# Patient Record
Sex: MALE | Race: WHITE | NOT HISPANIC OR LATINO | Employment: FULL TIME | URBAN - METROPOLITAN AREA
[De-identification: names, ages, dates, MRNs, and addresses within clinical notes are randomized per-mention and may not be internally consistent; named-entity substitution may affect disease eponyms.]

---

## 2017-01-03 ENCOUNTER — AMBULATORY - HEALTHEAST (OUTPATIENT)
Dept: ADDICTION MEDICINE | Facility: CLINIC | Age: 27
End: 2017-01-03

## 2017-01-05 ENCOUNTER — OFFICE VISIT - HEALTHEAST (OUTPATIENT)
Dept: ADDICTION MEDICINE | Facility: CLINIC | Age: 27
End: 2017-01-05

## 2017-01-05 DIAGNOSIS — F10.20 ALCOHOL USE DISORDER, MODERATE, DEPENDENCE (H): ICD-10-CM

## 2017-01-09 ENCOUNTER — AMBULATORY - HEALTHEAST (OUTPATIENT)
Dept: ADDICTION MEDICINE | Facility: CLINIC | Age: 27
End: 2017-01-09

## 2017-01-19 ENCOUNTER — OFFICE VISIT - HEALTHEAST (OUTPATIENT)
Dept: ADDICTION MEDICINE | Facility: CLINIC | Age: 27
End: 2017-01-19

## 2017-01-19 DIAGNOSIS — F10.20 ALCOHOL USE DISORDER, MODERATE, DEPENDENCE (H): ICD-10-CM

## 2017-01-20 ENCOUNTER — AMBULATORY - HEALTHEAST (OUTPATIENT)
Dept: ADDICTION MEDICINE | Facility: CLINIC | Age: 27
End: 2017-01-20

## 2021-05-28 ENCOUNTER — RECORDS - HEALTHEAST (OUTPATIENT)
Dept: ADMINISTRATIVE | Facility: CLINIC | Age: 31
End: 2021-05-28

## 2021-06-08 NOTE — PROGRESS NOTES
[facility] CHEMICAL DEPENDENCY   DISCHARGE SUMMARY       NAME:  Tony Bonilla   Physician:     MRN:  [id number] :  Donald Welander BS, [facility]#:  [id number] Funding Source:  [employer]   Admit Date: 3/30/16 Discharge Date: 2017     :  1990 Hours Completed: 88 hours of IOP, 24 hours of Relapse Prevention aftercare   Initial Diagnosis:    Moderate Alcohol Use Disorder, F10.20  Moderate Cannabis Use Disorder, F12.20 Final Diagnosis:  Moderate Alcohol Use Disorder, F10.20, Moderate Cannabis Use Disorder, F12.20   Discharge Address:    [address] [place] [address]          Discharge Type:  With Staff Approval (WSA)    Reasons for and circumstances of service termination:   Tony has successfully completed both IOP and the Relapse Prevention aftercare program. Pt met all treatment attendance, engagement, and abstinence expectations and is discharged on this date, 2017, WSA.        Dimension/Course of Treatment/Individualized Care:   1. Withdrawal Potential - Risk level - 0, No Concerns. Pt reports his last day of use for alcohol and marijuana was 3/1/2016, with no withdrawal concerns.      2. Biomedical Conditions and Complications - Risk level - 0, No Concerns Pt reports no current biomedical concerns, no primary care physician, but is able to access medical care as needed.      3. Emotional/Behavioral/Cognitive Conditions and Complications - Risk level -  1, Mild Concerns Pt reports no MH diagnosis, but has had MH services in the past.  Pt reports some linger feelings of sadness over no relationship with his former stepson, but appears to have made some progress during tx. Pt continues to be very positive about current SO.      4. Treatment Acceptance/Resistance - Risk Level -0, no Concerns Pt reports a desire to live a sober lifestyle and met all treatment expectations. Pt is externally motivated by probation, but appears to be feeling some benefits  of sustained sober living.      5. Relapse/Continued Use/Continued Problem Potential - 1, mild Concerns. Pt remains mildly vulnerable to relapse due to sober living skills are just in development and hx of return to use. Pt appears to have made progress in IOP and aftercare and was able to cite coping skills at discharge.        6. Recovery Environment - Risk level -1, Mild Concerns. Pt reports he is employed full-time and owns his own home. Pt reports supportive SO, and they appear to be growing closer. Pt reports significant support from his moiz community and is involved in music. Pt reports great support from his [employer].  Pt has minimal connection to the sober community with sporadic AA attendance. Pt is on probation.        Services Provided: Intake, assessment, treatment planning, education, group discussion, film, lectures, 1x1 therapy, and recommendations at discharge.     Strengths: Very intelligent, strong [employer] background, hard working. Supportive and appeared genuinely invested in peers lives.   Needs: To remain clean and sober, continue to build sober support through [employer] and peers.        Program Involvement: Good  Attendance: Fair  Ability to relate in group/   Other program activities: Excellent  Assignment Completion: Good  Overall Behavior: Excellent  Reported Family/Significant   Other Involvement: Good    Prognosis: Good      Recommendations       Remain clean and sober, meet all legal obligations, and continue to build sober support through [employer] and peers.     Mental Health Referral  None at discharge      Physical Health Referral:  Personal Physician                Counselor Name and Title:  Donald Welander BS, [facility]    Date:  1/20/17  Time:  3:09 PM

## 2021-06-08 NOTE — PROGRESS NOTES
"Weekly Progress Note  Tony Bonilla  1990  [id number]      D) Pt attended 1 groups  this week with 0 absences. A) Staff facilitated groups and reviewed tx progress. Assessed for [facility]. R) No VAP needed at this time. Pt is working on the following dimensions.    Dimension I- Intoxication/Withdrawal Potential- Risk Ratin, No Concerns. Pt reports his last day of use for alcohol and marijuana was 3/1/2016, with no withdrawal concerns.   Dimension II- Biomedical Conditions- Risk Ratin, No Concerns Pt reports no current biomedical concerns, no primary care physician, but is able to access medical care as needed.   Dimension III- Emotional, Behavioral, Cognitive Concerns- Risk Ratin, Mild Concerns Pt reports no MH diagnosis, but has had MH services in the past. Pt reports he continues to be angry with his ex-SO and is sad he cannot see her son who he became close to. Pt continued to report frustration with his Restoration[employer] , but was also upbeat about his new relationship. Pt reported his only concern is that he is \"too good looking\" and staff noted Pt appears very confident at this time.   Dimension IV- Readiness to Change- Risk Ratin, Mild Concerns Pt reports a desire to live a sober lifestyle and meet treatment expectations. Pt is externally motivated by probation, but appears to be feeling some benefits of sustained sober living. Pt has had trouble with attendance, but staff is also aware of Pt's work obligations on .   Dimension V- Relapse Potential- Risk Ratin, Moderate Concerns.  Pt remains vulnerable to relapse due to sober living skills are just in development at this time. Pt has been able to maintain some periods of abstinence in the past, but has often returned to use.   Dimension VI- Recovery Environment- Risk Ratin, Mild Concerns. Pt reports he is employed full-time and owns his own home.  Pt reports supportive SO, and they appear to be growing closer.    Pt reports " "significant support from his moiz community and is involved in music. Pt has minimal connection to the sober community with sporadic AA attendance. Pt is on probation.     T) Client educated on Keeping it Simple.   Client has completed 88 hours of IOP. Pt has completed 20 hours of Relapse Prevention.  Projected discharge date is 1/20/17. Current discharge plan is TBD.     Donald Welander, LADC        Psycho-Educational Curriculum  Date Attended  Psycho-Educational Curriculum  Date Attended    Acceptance   Shame/Guilt     1st Step   Anger/Rage     Affirmations   Mental Health     Automatic Negative Thoughts   Anxiety     Cross Addiction   Co-Occurring Disorders     Stages of Change   Tram/Bipolar     Relapse   Trauma      Addictive Thoughts   Victim Identity     Coping Skills   Sober Structure     Relapse Prevention   Continuum of Care     Medical Aspects   Non-12 Step Support     Brain/Neurotransmitters   Priorities     Medication Compliance   Spirituality     SABI Alcohol/Drug Research   Weekend Planner     Physical Health   Educational Videos     Post Acute Withdrawal   1st Step     Pregnancy and Drug Use   2nd Step     Sexual Health   Assertive Communication     Short-Term/Long-Term Effects   My name is Manuel GONZALES    Relationships   Cross Addiction     Assertive Communication   God As We Understood Him     Boundaries   HBO Relapse     Codependence    HBO What Is Addiction     Defense Mechanisms    Medical Aspects 1     Family Roles   Medical Aspects 2     Goodbye Letter   [employer] Geographic: Stress     Intimacy   PBS Depression Out of the Shadows     Needs/Dealbreakers in Relationships   The Anonymous People    Socialization Skills   Omar     Feelings   Eliecer Noe \"Highjacked Brain\"    ABC Model of Emotion   Alexis Saleh Humor in Tx    Grief and Loss   The Mindfulness Movie    Healthy vs. Unhealthy Feelings   Manuel GONZALES documentary     Meditation/Mindfulness       Overconfidence/Complacency       Resentments   "     Stress

## 2021-06-08 NOTE — PROGRESS NOTES
Weekly Progress Note  Tony Bonilla  1990  [id number]      D) Pt attended 1 groups  this week with 0 absences. A) Staff facilitated groups and reviewed tx progress. Assessed for VA. R) No VAP needed at this time. Pt is working on the following dimensions.    Dimension I- Intoxication/Withdrawal Potential- Risk Ratin, No Concerns. Pt reports his last day of use for alcohol and marijuana was 3/1/2016, with no withdrawal concerns.   Dimension II- Biomedical Conditions- Risk Ratin, No Concerns Pt reports no current biomedical concerns, no primary care physician, but is able to access medical care as needed.   Dimension III- Emotional, Behavioral, Cognitive Concerns- Risk Ratin, Mild Concerns Pt reports no MH diagnosis, but has had MH services in the past. Pt reports he continues to be angry with his ex-SO and is sad he cannot see her son who he became close to. Pt reports some linger feelings of sadness over no relationship with his former stepson. Pt continues to be very positive about current SO.   Dimension IV- Readiness to Change- Risk Ratin, no Concerns Pt reports a desire to live a sober lifestyle and meet treatment expectations. Pt is externally motivated by probation, but appears to be feeling some benefits of sustained sober living. Pt has had trouble with attendance, but staff is also aware of Pt's work obligations on .   Dimension V- Relapse Potential- Risk Ratin, Moderate Concerns.  Pt remains vulnerable to relapse due to sober living skills are just in development at this time. Pt has been able to maintain some periods of abstinence in the past, but has often returned to use.   Dimension VI- Recovery Environment- Risk Ratin, Mild Concerns. Pt reports he is employed full-time and owns his own home.  Pt reports supportive SO, and they appear to be growing closer.    Pt reports significant support from his moiz community and is involved in music. Pt reports great support  "from his drummer.  Pt has minimal connection to the sober community with sporadic AA attendance. Pt is on probation.     T) Client educated on Sober Support.   Client has completed 88 hours of IOP. Pt has completed 22 hours of Relapse Prevention.  Projected discharge date is 1/27/17. Current discharge plan is TBD.     Donald Welander, LADC        Psycho-Educational Curriculum  Date Attended  Psycho-Educational Curriculum  Date Attended    Acceptance   Shame/Guilt     1st Step   Anger/Rage     Affirmations   Mental Health     Automatic Negative Thoughts   Anxiety     Cross Addiction   Co-Occurring Disorders     Stages of Change   Tram/Bipolar     Relapse   Trauma      Addictive Thoughts   Victim Identity     Coping Skills   Sober Structure     Relapse Prevention   Continuum of Care     Medical Aspects   Non-12 Step Support     Brain/Neurotransmitters   Priorities     Medication Compliance   Spirituality     SABI Alcohol/Drug Research   Weekend Planner     Physical Health   Educational Videos     Post Acute Withdrawal   1st Step     Pregnancy and Drug Use   2nd Step     Sexual Health   Assertive Communication     Short-Term/Long-Term Effects   My name is Manuel Maurice   Cross Addiction     Assertive Communication   God As We Understood Him     Boundaries   HBO Relapse     Codependence    HBO What Is Addiction     Defense Mechanisms    Medical Aspects 1     Family Roles   Medical Aspects 2     Goodbye Letter   [employer] Geographic: Stress     Intimacy   PBS Depression Out of the Shadows     Needs/Dealbreakers in Relationships   The Anonymous People    Socialization Skills   [place]     Feelings   Eliecer Noe \"Highjacked Brain\"    ABC Model of Emotion   Alexis Saleh Humor in Tx    Grief and Loss   The Mindfulness Movie    Healthy vs. Unhealthy Feelings   Manuel GONZALES documentary     Meditation/Mindfulness       Overconfidence/Complacency       Resentments       Stress           "

## 2022-10-31 ENCOUNTER — OFFICE VISIT (OUTPATIENT)
Dept: PEDIATRICS | Facility: CLINIC | Age: 32
End: 2022-10-31
Attending: NURSE PRACTITIONER
Payer: COMMERCIAL

## 2022-10-31 ENCOUNTER — HOSPITAL ENCOUNTER (OUTPATIENT)
Dept: CT IMAGING | Facility: CLINIC | Age: 32
Discharge: HOME | End: 2022-10-31
Attending: PHYSICIAN ASSISTANT | Admitting: PHYSICIAN ASSISTANT
Payer: COMMERCIAL

## 2022-10-31 ENCOUNTER — HOSPITAL ENCOUNTER (INPATIENT)
Facility: CLINIC | Age: 32
LOS: 3 days | Discharge: HOME | End: 2022-11-03
Attending: EMERGENCY MEDICINE | Admitting: INTERNAL MEDICINE
Payer: COMMERCIAL

## 2022-10-31 ENCOUNTER — VIRTUAL VISIT (OUTPATIENT)
Dept: PEDIATRICS | Facility: CLINIC | Age: 32
End: 2022-10-31
Payer: COMMERCIAL

## 2022-10-31 VITALS
TEMPERATURE: 101 F | HEART RATE: 119 BPM | DIASTOLIC BLOOD PRESSURE: 104 MMHG | RESPIRATION RATE: 18 BRPM | OXYGEN SATURATION: 97 % | SYSTOLIC BLOOD PRESSURE: 148 MMHG | WEIGHT: 243 LBS

## 2022-10-31 DIAGNOSIS — R82.998 DARK URINE: ICD-10-CM

## 2022-10-31 DIAGNOSIS — R10.31 RLQ ABDOMINAL PAIN: ICD-10-CM

## 2022-10-31 DIAGNOSIS — R17 ELEVATED BILIRUBIN: ICD-10-CM

## 2022-10-31 DIAGNOSIS — R19.5 DARK STOOLS: ICD-10-CM

## 2022-10-31 DIAGNOSIS — R10.31 RLQ ABDOMINAL PAIN: Primary | ICD-10-CM

## 2022-10-31 DIAGNOSIS — K57.32 SIGMOID DIVERTICULITIS: Primary | ICD-10-CM

## 2022-10-31 DIAGNOSIS — R19.7 DIARRHEA, UNSPECIFIED TYPE: ICD-10-CM

## 2022-10-31 DIAGNOSIS — R10.30 LOWER ABDOMINAL PAIN: ICD-10-CM

## 2022-10-31 DIAGNOSIS — K76.0 HEPATIC STEATOSIS: ICD-10-CM

## 2022-10-31 DIAGNOSIS — Z87.891 PERSONAL HISTORY OF TOBACCO USE, PRESENTING HAZARDS TO HEALTH: ICD-10-CM

## 2022-10-31 DIAGNOSIS — R17 ICTERUS: ICD-10-CM

## 2022-10-31 DIAGNOSIS — K57.20 PERFORATION OF SIGMOID COLON DUE TO DIVERTICULITIS: Primary | ICD-10-CM

## 2022-10-31 DIAGNOSIS — A41.9 SEPSIS WITHOUT ACUTE ORGAN DYSFUNCTION, DUE TO UNSPECIFIED ORGANISM (H): ICD-10-CM

## 2022-10-31 DIAGNOSIS — F10.20 ALCOHOL USE DISORDER, SEVERE, DEPENDENCE (H): ICD-10-CM

## 2022-10-31 DIAGNOSIS — R21 RASH: ICD-10-CM

## 2022-10-31 LAB
ALBUMIN SERPL BCG-MCNC: 4.3 G/DL (ref 3.5–5.2)
ALBUMIN UR-MCNC: 300 MG/DL
ALP SERPL-CCNC: 88 U/L (ref 40–129)
ALT SERPL W P-5'-P-CCNC: 93 U/L (ref 10–50)
ANION GAP SERPL CALCULATED.3IONS-SCNC: 16 MMOL/L (ref 7–15)
APPEARANCE UR: CLEAR
AST SERPL W P-5'-P-CCNC: 32 U/L (ref 10–50)
BACTERIA #/AREA URNS HPF: ABNORMAL /HPF
BASOPHILS # BLD AUTO: 0.1 10E3/UL (ref 0–0.2)
BASOPHILS NFR BLD AUTO: 0 %
BILIRUB SERPL-MCNC: 6.2 MG/DL
BILIRUB UR QL STRIP: ABNORMAL
BUN SERPL-MCNC: 11.9 MG/DL (ref 6–20)
CALCIUM SERPL-MCNC: 9.1 MG/DL (ref 8.6–10)
CHLORIDE SERPL-SCNC: 93 MMOL/L (ref 98–107)
COLOR UR AUTO: ABNORMAL
CREAT SERPL-MCNC: 1.12 MG/DL (ref 0.67–1.17)
CRP SERPL-MCNC: 246.32 MG/L
DEPRECATED HCO3 PLAS-SCNC: 27 MMOL/L (ref 22–29)
EOSINOPHIL # BLD AUTO: 0.2 10E3/UL (ref 0–0.7)
EOSINOPHIL NFR BLD AUTO: 1 %
ERYTHROCYTE [DISTWIDTH] IN BLOOD BY AUTOMATED COUNT: 12.8 % (ref 10–15)
ERYTHROCYTE [SEDIMENTATION RATE] IN BLOOD BY WESTERGREN METHOD: 9 MM/HR (ref 0–15)
GFR SERPL CREATININE-BSD FRML MDRD: 90 ML/MIN/1.73M2
GLUCOSE SERPL-MCNC: 122 MG/DL (ref 70–99)
GLUCOSE UR STRIP-MCNC: NEGATIVE MG/DL
HCT VFR BLD AUTO: 49.7 % (ref 40–53)
HGB BLD-MCNC: 16.6 G/DL (ref 13.3–17.7)
HGB UR QL STRIP: ABNORMAL
IMM GRANULOCYTES # BLD: 0.2 10E3/UL
IMM GRANULOCYTES NFR BLD: 1 %
KETONES UR STRIP-MCNC: NEGATIVE MG/DL
LACTATE SERPL-SCNC: 1 MMOL/L (ref 0.7–2)
LEUKOCYTE ESTERASE UR QL STRIP: NEGATIVE
LIPASE SERPL-CCNC: 18 U/L (ref 13–60)
LYMPHOCYTES # BLD AUTO: 2.3 10E3/UL (ref 0.8–5.3)
LYMPHOCYTES NFR BLD AUTO: 10 %
MCH RBC QN AUTO: 33 PG (ref 26.5–33)
MCHC RBC AUTO-ENTMCNC: 33.4 G/DL (ref 31.5–36.5)
MCV RBC AUTO: 99 FL (ref 78–100)
MONOCYTES # BLD AUTO: 2.6 10E3/UL (ref 0–1.3)
MONOCYTES NFR BLD AUTO: 11 %
MUCOUS THREADS #/AREA URNS LPF: PRESENT /LPF
NEUTROPHILS # BLD AUTO: 18.8 10E3/UL (ref 1.6–8.3)
NEUTROPHILS NFR BLD AUTO: 77 %
NITRATE UR QL: NEGATIVE
NRBC # BLD AUTO: 0 10E3/UL
NRBC BLD AUTO-RTO: 0 /100
PH UR STRIP: 6 [PH] (ref 5–7)
PLATELET # BLD AUTO: 268 10E3/UL (ref 150–450)
POTASSIUM SERPL-SCNC: 3.6 MMOL/L (ref 3.4–5.3)
PROT SERPL-MCNC: 7.5 G/DL (ref 6.4–8.3)
RBC # BLD AUTO: 5.03 10E6/UL (ref 4.4–5.9)
RBC URINE: 7 /HPF
SARS-COV-2 RNA RESP QL NAA+PROBE: NEGATIVE
SODIUM SERPL-SCNC: 136 MMOL/L (ref 136–145)
SP GR UR STRIP: 1.02 (ref 1–1.03)
UROBILINOGEN UR STRIP-MCNC: 3 MG/DL
WBC # BLD AUTO: 24.1 10E3/UL (ref 4–11)
WBC URINE: 3 /HPF

## 2022-10-31 PROCEDURE — 36415 COLL VENOUS BLD VENIPUNCTURE: CPT | Performed by: PHYSICIAN ASSISTANT

## 2022-10-31 PROCEDURE — 85652 RBC SED RATE AUTOMATED: CPT | Performed by: PHYSICIAN ASSISTANT

## 2022-10-31 PROCEDURE — 87040 BLOOD CULTURE FOR BACTERIA: CPT | Performed by: EMERGENCY MEDICINE

## 2022-10-31 PROCEDURE — 36415 COLL VENOUS BLD VENIPUNCTURE: CPT | Performed by: EMERGENCY MEDICINE

## 2022-10-31 PROCEDURE — 99222 1ST HOSP IP/OBS MODERATE 55: CPT | Mod: AI | Performed by: INTERNAL MEDICINE

## 2022-10-31 PROCEDURE — 258N000003 HC RX IP 258 OP 636: Performed by: EMERGENCY MEDICINE

## 2022-10-31 PROCEDURE — 99285 EMERGENCY DEPT VISIT HI MDM: CPT | Mod: 25

## 2022-10-31 PROCEDURE — 85025 COMPLETE CBC W/AUTO DIFF WBC: CPT | Performed by: PHYSICIAN ASSISTANT

## 2022-10-31 PROCEDURE — 83605 ASSAY OF LACTIC ACID: CPT | Performed by: EMERGENCY MEDICINE

## 2022-10-31 PROCEDURE — C9803 HOPD COVID-19 SPEC COLLECT: HCPCS

## 2022-10-31 PROCEDURE — 96375 TX/PRO/DX INJ NEW DRUG ADDON: CPT

## 2022-10-31 PROCEDURE — 250N000009 HC RX 250: Performed by: PHYSICIAN ASSISTANT

## 2022-10-31 PROCEDURE — 250N000011 HC RX IP 250 OP 636: Performed by: PHYSICIAN ASSISTANT

## 2022-10-31 PROCEDURE — 96376 TX/PRO/DX INJ SAME DRUG ADON: CPT

## 2022-10-31 PROCEDURE — 258N000003 HC RX IP 258 OP 636: Performed by: INTERNAL MEDICINE

## 2022-10-31 PROCEDURE — 250N000011 HC RX IP 250 OP 636: Performed by: EMERGENCY MEDICINE

## 2022-10-31 PROCEDURE — 96361 HYDRATE IV INFUSION ADD-ON: CPT

## 2022-10-31 PROCEDURE — 99207 REFERRAL TO ACUTE AND DIAGNOSTIC SERVICES: CPT | Performed by: NURSE PRACTITIONER

## 2022-10-31 PROCEDURE — 99205 OFFICE O/P NEW HI 60 MIN: CPT | Performed by: PHYSICIAN ASSISTANT

## 2022-10-31 PROCEDURE — 120N000001 HC R&B MED SURG/OB

## 2022-10-31 PROCEDURE — 250N000013 HC RX MED GY IP 250 OP 250 PS 637: Performed by: INTERNAL MEDICINE

## 2022-10-31 PROCEDURE — 87086 URINE CULTURE/COLONY COUNT: CPT | Performed by: PHYSICIAN ASSISTANT

## 2022-10-31 PROCEDURE — 96365 THER/PROPH/DIAG IV INF INIT: CPT

## 2022-10-31 PROCEDURE — 86140 C-REACTIVE PROTEIN: CPT | Performed by: PHYSICIAN ASSISTANT

## 2022-10-31 PROCEDURE — 74177 CT ABD & PELVIS W/CONTRAST: CPT

## 2022-10-31 PROCEDURE — U0005 INFEC AGEN DETEC AMPLI PROBE: HCPCS | Performed by: EMERGENCY MEDICINE

## 2022-10-31 PROCEDURE — 83690 ASSAY OF LIPASE: CPT | Performed by: PHYSICIAN ASSISTANT

## 2022-10-31 PROCEDURE — 80053 COMPREHEN METABOLIC PANEL: CPT | Performed by: PHYSICIAN ASSISTANT

## 2022-10-31 PROCEDURE — 81001 URINALYSIS AUTO W/SCOPE: CPT | Performed by: PHYSICIAN ASSISTANT

## 2022-10-31 RX ORDER — ACETAMINOPHEN 325 MG/1
650 TABLET ORAL EVERY 4 HOURS PRN
Status: DISCONTINUED | OUTPATIENT
Start: 2022-10-31 | End: 2022-11-03 | Stop reason: HOSPADM

## 2022-10-31 RX ORDER — IBUPROFEN 200 MG
400 TABLET ORAL EVERY 6 HOURS PRN
Status: ON HOLD | COMMUNITY
End: 2022-11-03

## 2022-10-31 RX ORDER — LIDOCAINE 40 MG/G
CREAM TOPICAL
Status: DISCONTINUED | OUTPATIENT
Start: 2022-10-31 | End: 2022-11-03 | Stop reason: HOSPADM

## 2022-10-31 RX ORDER — SODIUM CHLORIDE, SODIUM LACTATE, POTASSIUM CHLORIDE, CALCIUM CHLORIDE 600; 310; 30; 20 MG/100ML; MG/100ML; MG/100ML; MG/100ML
INJECTION, SOLUTION INTRAVENOUS CONTINUOUS
Status: DISCONTINUED | OUTPATIENT
Start: 2022-10-31 | End: 2022-11-03

## 2022-10-31 RX ORDER — KETOROLAC TROMETHAMINE 15 MG/ML
15 INJECTION, SOLUTION INTRAMUSCULAR; INTRAVENOUS ONCE
Status: COMPLETED | OUTPATIENT
Start: 2022-10-31 | End: 2022-10-31

## 2022-10-31 RX ORDER — ONDANSETRON 2 MG/ML
4 INJECTION INTRAMUSCULAR; INTRAVENOUS ONCE
Status: COMPLETED | OUTPATIENT
Start: 2022-10-31 | End: 2022-10-31

## 2022-10-31 RX ORDER — ACETAMINOPHEN 500 MG
1000 TABLET ORAL ONCE
Status: DISCONTINUED | OUTPATIENT
Start: 2022-10-31 | End: 2022-10-31

## 2022-10-31 RX ORDER — BETAMETHASONE DIPROPIONATE 0.5 MG/G
OINTMENT, AUGMENTED TOPICAL 2 TIMES DAILY
Qty: 50 G | Refills: 11 | Status: SHIPPED | OUTPATIENT
Start: 2022-10-31 | End: 2022-11-29

## 2022-10-31 RX ORDER — MORPHINE SULFATE 4 MG/ML
4 INJECTION, SOLUTION INTRAMUSCULAR; INTRAVENOUS
Status: DISCONTINUED | OUTPATIENT
Start: 2022-10-31 | End: 2022-11-03 | Stop reason: HOSPADM

## 2022-10-31 RX ORDER — ONDANSETRON 2 MG/ML
4 INJECTION INTRAMUSCULAR; INTRAVENOUS EVERY 6 HOURS PRN
Status: DISCONTINUED | OUTPATIENT
Start: 2022-10-31 | End: 2022-11-03 | Stop reason: HOSPADM

## 2022-10-31 RX ORDER — MORPHINE SULFATE 4 MG/ML
4 INJECTION, SOLUTION INTRAMUSCULAR; INTRAVENOUS ONCE
Status: COMPLETED | OUTPATIENT
Start: 2022-10-31 | End: 2022-10-31

## 2022-10-31 RX ORDER — ACETAMINOPHEN 650 MG/1
650 SUPPOSITORY RECTAL EVERY 4 HOURS PRN
Status: DISCONTINUED | OUTPATIENT
Start: 2022-10-31 | End: 2022-11-03 | Stop reason: HOSPADM

## 2022-10-31 RX ORDER — ONDANSETRON 4 MG/1
4 TABLET, ORALLY DISINTEGRATING ORAL EVERY 6 HOURS PRN
Status: DISCONTINUED | OUTPATIENT
Start: 2022-10-31 | End: 2022-11-03 | Stop reason: HOSPADM

## 2022-10-31 RX ORDER — ACETAMINOPHEN 325 MG/1
650 TABLET ORAL EVERY 6 HOURS PRN
COMMUNITY
End: 2022-11-16

## 2022-10-31 RX ORDER — IOPAMIDOL 755 MG/ML
500 INJECTION, SOLUTION INTRAVASCULAR ONCE
Status: COMPLETED | OUTPATIENT
Start: 2022-10-31 | End: 2022-10-31

## 2022-10-31 RX ORDER — MUPIROCIN 20 MG/G
OINTMENT TOPICAL 3 TIMES DAILY
Qty: 30 G | Refills: 11 | Status: SHIPPED | OUTPATIENT
Start: 2022-10-31 | End: 2022-11-29

## 2022-10-31 RX ADMIN — SODIUM CHLORIDE 1000 ML: 9 INJECTION, SOLUTION INTRAVENOUS at 16:12

## 2022-10-31 RX ADMIN — Medication 1000 MG: at 12:11

## 2022-10-31 RX ADMIN — ACETAMINOPHEN 650 MG: 325 TABLET, FILM COATED ORAL at 21:32

## 2022-10-31 RX ADMIN — KETOROLAC TROMETHAMINE 15 MG: 15 INJECTION, SOLUTION INTRAMUSCULAR; INTRAVENOUS at 16:13

## 2022-10-31 RX ADMIN — TAZOBACTAM SODIUM AND PIPERACILLIN SODIUM 4.5 G: 500; 4 INJECTION, SOLUTION INTRAVENOUS at 17:00

## 2022-10-31 RX ADMIN — SODIUM CHLORIDE, POTASSIUM CHLORIDE, SODIUM LACTATE AND CALCIUM CHLORIDE: 600; 310; 30; 20 INJECTION, SOLUTION INTRAVENOUS at 21:09

## 2022-10-31 RX ADMIN — IOPAMIDOL 90 ML: 755 INJECTION, SOLUTION INTRAVENOUS at 12:41

## 2022-10-31 RX ADMIN — SODIUM CHLORIDE 65 ML: 9 INJECTION, SOLUTION INTRAVENOUS at 12:41

## 2022-10-31 RX ADMIN — ONDANSETRON 4 MG: 2 INJECTION INTRAMUSCULAR; INTRAVENOUS at 16:12

## 2022-10-31 RX ADMIN — MORPHINE SULFATE 4 MG: 4 INJECTION INTRAVENOUS at 16:12

## 2022-10-31 ASSESSMENT — ACTIVITIES OF DAILY LIVING (ADL)
ADLS_ACUITY_SCORE: 35

## 2022-10-31 ASSESSMENT — ENCOUNTER SYMPTOMS
FEVER: 1
CHILLS: 1
ABDOMINAL PAIN: 1
DIARRHEA: 1
VOMITING: 1

## 2022-10-31 NOTE — ED TRIAGE NOTES
"Had a virtual visit today for low abdominal pain that started on Sunday. Pt's CT scan at the specialty clinic showed diverticulitis with perforation. Pt was told to come to ED for \"antibiotics and surgery.\" Tachycardia. Last took tylenol 12. Denies blood in stool. Denies N/V.       "

## 2022-10-31 NOTE — PROGRESS NOTES
Tony is a 32 year old who is being evaluated via a billable video visit.      How would you like to obtain your AVS? MyChart  If the video visit is dropped, the invitation should be resent by:     Assessment & Plan     (R10.31) RLQ abdominal pain  (primary encounter diagnosis)  Comment: Pt reports sudden onset last night of 7.5/10 RLQ pain radiating to the left side. Has associated chills, diarrhea, and urinary hesitancy. Denies dysuria, blood in stool. Low risk STI.   Plan: Referral to Acute and Diagnostic Services (Day         of diagnostic / First order acute)  -Spoke with ADS who agrees to take him  -Advised him to be NPO other than sips of water so he can leave a urine sample    (R21) Rash  Comment: Possibly poison ivy as he has an outdoor cat who lays on his chest. States this is similar to when he had poison ivy in the past. Did consider ringworm given that he is exposed to his cat and his SO had this recently, but lesions to not appear classic for ringworm. Did consider scabies but less likely given localized rash. Appears to be some superinfection impetigo.   Plan: mupirocin (BACTROBAN) 2 % external ointment,         augmented betamethasone dipropionate         (DIPROLENE-AF) 0.05 % external ointment  -Trial topical steroids and mupirocin  -Avoid cat. Wash all clothing and sheets  -If not improving, re-evaluate and consider 6 weeks oral steroids if poison ivy is likely or topical vs oral antifungals if this seems to be more ringworm.     No follow-ups on file.    MICHEAL Rivers CNP  [facility] MARISELA Gandhi   Tony is a 32 year old, presenting for the following health issues:  No chief complaint on file.      HPI     Review of Systems   Constitutional, HEENT, cardiovascular, pulmonary, gi and gu systems are negative, except as otherwise noted.      Objective           Vitals:  No vitals were obtained today due to virtual visit.    Physical Exam   DERM: No rash or other  lesions.          Video-Visit Details    Video Start Time: 1015    Type of service:  Video Visit    Video End Time:9:37 AM    Originating Location (pt. Location): Home        Distant Location (provider location):  Off-site    Platform used for Video Visit: Ashley

## 2022-10-31 NOTE — RESULT ENCOUNTER NOTE
Results discussed directly with patient while patient was present. Any further details documented in the note.   Yeni Godinez PA-C

## 2022-10-31 NOTE — PROGRESS NOTES
Assessment & Plan     Perforation of sigmoid colon due to diverticulitis  Lower abdominal pain  Diarrhea, unspecified type  Dark stools  Stat CT shows sigmoid diverticulitis with perforation.  Labs/fever consistent with possible septic process.  Patient will be transported to the emergency room for urgent/emergent care/likely admission.  All questions answered to the patient's satisfaction based on the information available at the time of transfer.  - Referral to Acute and Diagnostic Services (Day of diagnostic / First order acute)  - UA with Microscopic reflex to Culture  - sodium chloride (PF) 0.9% PF flush 3 mL  - acetaminophen (TYLENOL) tablet 1,000 mg  - CT Abdomen Pelvis w Contrast  - CBC with platelets differential  - Comprehensive metabolic panel  - Erythrocyte sedimentation rate auto  - CRP inflammation  - Lipase    Alcohol use disorder, severe, dependence (H)  Hepatic steatosis  Icterus  Continued EtOH use with hepatic steatosis, mild icterus, and slightly elevated LFTs all likely secondary to chronic alcohol use.  Briefly discussed possible complication/long-term sequela of continued use especially in light of family history of liver failure in mother.  Encouraged cessation/tapering with the help of PCP upon discharge.  History of intensive outpatient through [facility] [id number].  Patient admits to 2-3 alcoholic beverages nightly for quite some time.    Dark urine  Elevated bilirubin  Unclear if related to acute diverticulitis and possible sepsis.  Close follow-up recommended.  Suspect that this is secondary to alcohol use.  Cessation/tapering/follow-up/work-up strongly recommended.  - Referral to Acute and Diagnostic Services (Day of diagnostic / First order acute)  - UA with Microscopic reflex to Culture  - sodium chloride (PF) 0.9% PF flush 3 mL  - CT Abdomen Pelvis w Contrast  - CBC with platelets differential  - Comprehensive metabolic panel  - Erythrocyte sedimentation rate auto  - CRP  "inflammation  - Lipase  - Urine Culture Aerobic Bacterial - lab collect        Discussion of management or test interpretation with external physician/other qualified healthcare professional/appropriate source - Dr. Evangelista in ED for transfer of care  80  minutes spent on the date of the encounter doing chart review, history and exam, documentation and further activities per the note       Nicotine/Tobacco Cessation:  He reports that he has been smoking cigars. He started smoking about 7 years ago. He has never used smokeless tobacco.  Nicotine/Tobacco Cessation Plan:   deferred d/t emergent condition    Return today (on 10/31/2022) for ER.    [facility] 32 year [facility], presenting for the following health issues:  Abdominal Pain (Bilateral lower abdominal pain X 2 days)      HPI     Abdominal/Flank Pain  Onset/Duration: X 2 days  Description:   Character: Sharp and Cramping  Location: right lower quadrant left lower quadrant xochitl-umbilical region  Radiation: None  Intensity: 6/10  Progression of Symptoms:  worsening  Accompanying Signs & Symptoms:  Fever/Chills: YES- sweating last night, chills, did not check temp.  Gas/Bloating: YES- bloating  Nausea: YES- intermittent  Vomiting: no   Diarrhea: YES-loose, not watery - 6 BM's in last 24 hours -black in color -denies mucus  Constipation: YES-difficult to determine when BM was completley passed   Dysuria or Hematuria: YES-notes cloudy and brown urine, also notes delay in urine delivery  History:   Trauma: no   Previous similar pain: no   Previous tests done: no   Previous Abdominal Surgery: no   Precipitating factors:   Does the pain change with:     Food: YES- increased pain last night after eating blueberries     Bowel Movement: YES- some slight temporary improvement    Urination: YES- some slight temporary improvement, \"feels like pressure being released.\"   Other factors:  no   Therapies " tried and outcome: Pepto Bismuth, this didn't change any symptoms.  IBU helps with pain    When did you eat last: Apple Juice at 05:00 today, water 06:00 today      Patient was in intensive outpatient program after a rule 25 assessment in 2016 through [facility] for alcohol and cannabis 3/30/2016 - 2017.  Risk of relapse upon discharge was suspected to be low.  Currently admits to 2-3 alcoholic drinks every evening and reports this has been for quite some time.  Mother  at the age of 60 from liver and kidney failure secondary to EtOH.    Review of Systems   Constitutional, HEENT, cardiovascular, pulmonary, GI, , musculoskeletal, neuro, skin, endocrine and psych systems are negative, except as otherwise noted.      Objective    BP (!) 148/104 (BP Location: Left arm, Patient Position: Chair, Cuff Size: Adult Large)   Pulse 119   Temp (!) 101  F (38.3  C) (Oral)   Resp 18   Wt 110.2 kg (243 lb)   SpO2 97%   There is no height or weight on file to calculate BMI.  Physical Exam   GENERAL: healthy, alert and no distress  EYES: Eyes -very mild scleral icterus, PERRL and conjunctivae and sclerae normal  RESP: lungs clear to auscultation - no rales, rhonchi or wheezes  CV: regular rate and rhythm, normal S1 S2, no S3 or S4, no murmur, click or rub, no peripheral edema and peripheral pulses strong  ABDOMEN: soft, tenderness to left lower quadrant, left lower quadrant, suprapubic region with guarding, no hepatosplenomegaly, no masses and bowel sounds normal  MS: no gross musculoskeletal defects noted, no edema  SKIN: no suspicious lesions or rashes  NEURO: Normal strength and tone, mentation intact and speech normal  PSYCH: mentation appears normal, affect normal/bright    Recent Results (from the past 744 hour(s))   CT Abdomen Pelvis w Contrast    Narrative    CT ABDOMEN PELVIS W CONTRAST 10/31/2022 12:48 PM    CLINICAL HISTORY: periumbilical abdominal pain, diarrhea w/possible  melena, dark urine,  fever and bilateral lower abdominal pain - concern  for appy vs diverticulitis, ? stone vs liver/pancreas/gallbladder  disease (significant ETOH use); RLQ abdominal pain; Dark urine;  Diarrhea, unspecified type; Dark stools    TECHNIQUE: CT scan of the abdomen and pelvis was performed following  injection of IV contrast. Multiplanar reformats were obtained. Dose  reduction techniques were used.  CONTRAST: 90mL Isovue-370    COMPARISON: None.    FINDINGS:   LOWER CHEST: Unremarkable.    HEPATOBILIARY: Diffuse hepatic steatosis.    PANCREAS: Normal.    SPLEEN: Normal.    ADRENAL GLANDS: Normal.    KIDNEYS/BLADDER: No hydronephrosis.    BOWEL: Colonic diverticulosis with focal inflammation and  extraperitoneal gas in/adjacent to the mid sigmoid colon (series 2  image 171). Evidence of peritonitis without large volume  pneumoperitoneum, bowel obstruction or drainable fluid collection at  this time. Normal appendix.    PELVIC ORGANS: Normal.    ADDITIONAL FINDINGS: None.    MUSCULOSKELETAL: No acute bony abnormality.      Impression    IMPRESSION:   1.  Acute sigmoid diverticulitis with likely local/contained  perforation. No large volume pneumoperitoneum or drainable fluid  collection at this time. Consider colonoscopy after treatment if not  previously performed.  2.  Diffuse hepatic steatosis.    DAMIAN BECK MD         SYSTEM ID:  [id number]       Results for orders placed or performed in visit on 10/31/22 (from the past 24 hour(s))   UA with Microscopic reflex to Culture    Specimen: Urine, Clean Catch   Result Value Ref Range    Color Urine Orange (A) Colorless, Straw, Light Yellow, Yellow    Appearance Urine Clear Clear    Glucose Urine Negative Negative mg/dL    Bilirubin Urine Small (A) Negative    Ketones Urine Negative Negative mg/dL    Specific Gravity Urine 1.025 1.003 - 1.035    Blood Urine Small (A) Negative    pH Urine 6.0 5.0 - 7.0    Protein Albumin Urine 300 (A) Negative mg/dL    Urobilinogen Urine  3.0 (A) Normal, 2.0 mg/dL    Nitrite Urine Negative Negative    Leukocyte Esterase Urine Negative Negative    Bacteria Urine Few (A) None Seen /HPF    Mucus Urine Present (A) None Seen /LPF    RBC Urine 7 (H) <=2 /HPF    WBC Urine 3 <=5 /HPF    Narrative    Urine Culture not indicated   CBC with platelets differential    Narrative    The following orders were created for panel order CBC with platelets differential.  Procedure                               Abnormality         Status                     ---------                               -----------         ------                     CBC with platelets and d...[[id number]]  Abnormal            Final result                 Please view results for these tests on the individual orders.   Comprehensive metabolic panel   Result Value Ref Range    Sodium 136 136 - 145 mmol/L    Potassium 3.6 3.4 - 5.3 mmol/L    Chloride 93 (L) 98 - 107 mmol/L    Carbon Dioxide (CO2) 27 22 - 29 mmol/L    Anion Gap 16 (H) 7 - 15 mmol/L    Urea Nitrogen 11.9 6.0 - 20.0 mg/dL    Creatinine 1.12 0.67 - 1.17 mg/dL    Calcium 9.1 8.6 - 10.0 mg/dL    Glucose 122 (H) 70 - 99 mg/dL    Alkaline Phosphatase 88 40 - 129 U/L    AST 32 10 - 50 U/L    ALT 93 (H) 10 - 50 U/L    Protein Total 7.5 6.4 - 8.3 g/dL    Albumin 4.3 3.5 - 5.2 g/dL    Bilirubin Total 6.2 (H) <=1.2 mg/dL    GFR Estimate 90 >60 mL/min/1.73m2   Erythrocyte sedimentation rate auto   Result Value Ref Range    Erythrocyte Sedimentation Rate 9 0 - 15 mm/hr   CRP inflammation   Result Value Ref Range    CRP Inflammation 246.32 (H) <5.00 mg/L   Lipase   Result Value Ref Range    Lipase 18 13 - 60 U/L   CBC with platelets and differential   Result Value Ref Range    WBC Count 24.1 (H) 4.0 - 11.0 10e3/uL    RBC Count 5.03 4.40 - 5.90 10e6/uL    Hemoglobin 16.6 13.3 - 17.7 g/dL    Hematocrit 49.7 40.0 - 53.0 %    MCV 99 78 - 100 fL    MCH 33.0 26.5 - 33.0 pg    MCHC 33.4 31.5 - 36.5 g/dL    RDW 12.8 10.0 - 15.0 %    Platelet Count 268 150 -  450 10e3/uL    % Neutrophils 77 %    % Lymphocytes 10 %    % Monocytes 11 %    % Eosinophils 1 %    % Basophils 0 %    % Immature Granulocytes 1 %    NRBCs per 100 WBC 0 <1 /100    Absolute Neutrophils 18.8 (H) 1.6 - 8.3 10e3/uL    Absolute Lymphocytes 2.3 0.8 - 5.3 10e3/uL    Absolute Monocytes 2.6 (H) 0.0 - 1.3 10e3/uL    Absolute Eosinophils 0.2 0.0 - 0.7 10e3/uL    Absolute Basophils 0.1 0.0 - 0.2 10e3/uL    Absolute Immature Granulocytes 0.2 <=0.4 10e3/uL    Absolute NRBCs 0.0 10e3/uL

## 2022-10-31 NOTE — H&P
History and Physical - Hospitalist Service       Date of Admission:  10/31/2022    Assessment & Plan      Tony Bonilla is a 32 year old male admitted on 10/31/2022.     Acute sigmoid diverticulitis  ?  Contained perforation  We will consult colorectal surgery for further advise regarding management  WBC will be followed  Pain medication in the form of morphine  IV fluid  Antinausea medication  Acetaminophen for fever  Mechanical DVT prophylaxis    Regular alcohol use  Hepatic steatosis  Monitor closely for alcohol withdrawal    Overweight/obesity  DVT prophylaxis by walking around and SCDs       Diet:   Clear liquid  Diet   DVT Prophylaxis: Low Risk/Ambulatory with no VTE prophylaxis indicated  Zavala Catheter: Not present  Central Lines: None  Cardiac Monitoring: None  Code Status:   FULL CODE     Disposition Plan   2 to 3 days in the hospital     The patient's care was discussed with the Patient.    Gavin Arango MD  Hospitalist Service    Securely message with the Vocera Web Console (learn more here)  Text page via [employer] Paging/Directory         ______________________________________________________________________    Chief Complaint   Abdominal pain    History is obtained from the patient, medical records and my discussion with emergency department physician      History of Present Illness   Tony Bonilla is a relatively healthy 32 year old gentleman who says that he is little overweight.   Starting on day prior to admission 10/30/2022 patient started having lower central abdominal pain 5-6 over 10 in intensity constant in nature associated with nausea vomiting and liquid diarrhea without any hematochezia.  He had fever with chills and temperature was recorded at 101  F today.   He then had a virtual visit on the day of admission for abdominal pain.  CT scan at the speciality clinic showed diverticulitis with perforation that was  contained.  He was told to come to the emergency department for further antibiotic treatment and evaluation by surgery.  In the emergency department temperature was 101  F pulse was 119 blood pressure 148/104.  WBC 24.1 .3 lactic acid 1 lipase 18 creatinine 1.12.  CT scan of the abdomen pelvis with contrast showed acute sigmoid diverticulitis with likely local/contained perforation.  No large volume pneumoperitoneum or drainable fluid collection at this time.  There was also diffuse hepatic steatosis seen.      Review of Systems    Denies any headache, blurring of vision or double vision, neck pain jaw pain or shoulder pain, chest pain, shortness of breath, cough or increased sputum production, as mentioned above had nausea, vomiting, abdominal pain, diarrhea.  Denies any urinary symptoms of burning or increased frequency. Denies any weakness of upper or lower extremities or any seizure activity.  Otherwise as mentioned above had fever without chills.  No bleeding from anywhere else.  No rashes or cellulitis.      Past Medical History    Obesity  Regular alcohol use - 2-3 drinks a day.     Past Surgical History   None    Social History   I have reviewed this patient's social history and updated it with pertinent information if needed.  Social History     Tobacco Use     Smoking status: Every Day     Types: Cigars     Start date: 1/1/2015     Smokeless tobacco: Never   Vaping Use     Vaping Use: Every day     Start date: 1/1/2017     Substances: Nicotine, Flavoring     Devices: Pre-filled or refillable cartridge   Substance Use Topics     Alcohol use: Yes     Comment: 2-3 drinks a night - since 2016     Drug use: Not Currently       Family History   I have reviewed this patient's family history and updated it with pertinent information if needed.  Family History   Problem Relation Age of Onset     Liver Disease Mother 60        due to ETOH     Kidney Disease Mother      No Known Problems Father      Other -  See Comments Half-Sister         falling accident       Prior to Admission Medications   Prior to Admission Medications   Prescriptions Last Dose Informant Patient Reported? Taking?   augmented betamethasone dipropionate (DIPROLENE-AF) 0.05 % external ointment   No No   Sig: Apply topically 2 times daily   mupirocin (BACTROBAN) 2 % external ointment   No No   Sig: Apply topically 3 times daily      Facility-Administered Medications Last Administration Doses Remaining   acetaminophen (TYLENOL) tablet 1,000 mg 10/31/2022 12:11 PM 0   sodium chloride (PF) 0.9% PF flush 3 mL 10/31/2022 12:21 PM         Allergies   Allergies   Allergen Reactions     Cefaclor GI Disturbance       Physical Exam   Vital Signs: Temp: 97.5  F (36.4  C) Temp src: Temporal BP: (!) 158/103 Pulse: 115   Resp: 18 SpO2: 95 % O2 Device: None (Room air)    Weight: 0 lbs 0 oz      GENERAL:  Patient does not look in any acute distress  HEENT:  EOM+, Conjunctiva is clear    NECK:  no Jugular Venous distention  HEART: S1 S2 regular  Rate and Rhythm, no murmur,    LUNGS: Respirations are not laboured, Lungs are clear to auscultation Crepitations or Wheezing   ABDOMEN: Soft, there is Left Lower Quadrant tenderness, Bowel Sounds are  Positive   LOWER LIMBS: no Pedal Edema  Bilaterally   CNS:  Alert,  Oriented x 3, Moving all the Four Limbs       Data   Data reviewed today: I reviewed all medications, new labs and imaging results over the last 24 hours     Most Recent 3 CBC's:Recent Labs   Lab Test 10/31/22  1223   WBC 24.1*   HGB 16.6   MCV 99        Most Recent 3 BMP's:Recent Labs   Lab Test 10/31/22  1223      POTASSIUM 3.6   CHLORIDE 93*   CO2 27   BUN 11.9   CR 1.12   ANIONGAP 16*   HERI 9.1   *     Most Recent 2 LFT's:Recent Labs   Lab Test 10/31/22  1223   AST 32   ALT 93*   ALKPHOS 88   BILITOTAL 6.2*     Most Recent Urinalysis:Recent Labs   Lab Test 10/31/22  1201   COLOR Orange*   APPEARANCE Clear   URINEGLC Negative   URINEBILI  Small*   URINEKETONE Negative   SG 1.025   UBLD Small*   URINEPH 6.0   PROTEIN 300*   NITRITE Negative   LEUKEST Negative   RBCU 7*   WBCU 3     Recent Results (from the past 24 hour(s))   CT Abdomen Pelvis w Contrast    Narrative    CT ABDOMEN PELVIS W CONTRAST 10/31/2022 12:48 PM    CLINICAL HISTORY: periumbilical abdominal pain, diarrhea w/possible  melena, dark urine, fever and bilateral lower abdominal pain - concern  for appy vs diverticulitis, ? stone vs liver/pancreas/gallbladder  disease (significant ETOH use); RLQ abdominal pain; Dark urine;  Diarrhea, unspecified type; Dark stools    TECHNIQUE: CT scan of the abdomen and pelvis was performed following  injection of IV contrast. Multiplanar reformats were obtained. Dose  reduction techniques were used.  CONTRAST: 90mL Isovue-370    COMPARISON: None.    FINDINGS:   LOWER CHEST: Unremarkable.    HEPATOBILIARY: Diffuse hepatic steatosis.    PANCREAS: Normal.    SPLEEN: Normal.    ADRENAL GLANDS: Normal.    KIDNEYS/BLADDER: No hydronephrosis.    BOWEL: Colonic diverticulosis with focal inflammation and  extraperitoneal gas in/adjacent to the mid sigmoid colon (series 2  image 171). Evidence of peritonitis without large volume  pneumoperitoneum, bowel obstruction or drainable fluid collection at  this time. Normal appendix.    PELVIC ORGANS: Normal.    ADDITIONAL FINDINGS: None.    MUSCULOSKELETAL: No acute bony abnormality.      Impression    IMPRESSION:   1.  Acute sigmoid diverticulitis with likely local/contained  perforation. No large volume pneumoperitoneum or drainable fluid  collection at this time. Consider colonoscopy after treatment if not  previously performed.  2.  Diffuse hepatic steatosis.    DAMIAN BECK MD         SYSTEM ID:  [id number]

## 2022-10-31 NOTE — CONSULTS
[facility]  Colon and Rectal Surgery Consult Note  Name: Tony Bonilla    MRN: [id number]  YOB: 1990    Age: 32 year old  Date of admission: 10/31/2022  Primary care provider: No Ref-Primary, Physician     Requesting Physician:  Dr. Herndon  Reason for consult: Diverticulitis with perforation           History of Present Illness:   Tony Bonilla is a 32 year old male, seen at the request of Dr. Herndon, who presents with abdominal pain.    Patient reports that starting yesterday he developed abdominal pain with associated fever, nausea, and 1 episode of vomiting yesterday morning.  He last had a bowel movement earlier today that was loose which is unusual for him.  He denies any blood in the stool.  He has not passed any gas for some time. He mentioned these symptoms during a virtual visit for a separate issue and he was recommended to present to the ER for further evaluation.    In the ER, patient is febrile to 101F.  Tachycardic to 119.  Blood pressure 148/104.  Labs significant for WBC 24.1. CT abdomen pelvis showed acute sigmoid diverticulitis with local/contained perforation.  No drainable fluid collection.  He will be admitted to the hospital for further management.  Patient reports that his pain is improved after the pain medicines he has received.  He continues to feel quite bloated.  No further nausea or vomiting.  Temp improved to 97.5F.  HR improved to 115.    Colonoscopy History:  None    Surgical History: None            Past Medical History:   No past medical history on file.          Past Surgical History:   No past surgical history on file.            Social History:     Social History     Tobacco Use     Smoking status: Every Day     Types: Cigars     Start date: 1/1/2015     Smokeless tobacco: Never   Substance Use Topics     Alcohol use: Yes     Comment: 2-3 drinks a night - since 2016             Family History:     Family History   Problem Relation Age of Onset      Liver Disease Mother 60        due to ETOH     Kidney Disease Mother      No Known Problems Father      Other - See Comments Half-Sister         falling accident             Allergies:     Allergies   Allergen Reactions     Cefaclor GI Disturbance             Medications:       sodium chloride 0.9%  1,000 mL Intravenous Once     piperacillin-tazobactam  4.5 g Intravenous Once             Review of Systems:   A comprehensive greater than 10 system review of systems was carried out.  Pertinent positives and negatives are noted above.  Otherwise negative for contributory info.            Physical Exam:     Blood pressure (!) 158/103, pulse 115, temperature 97.5  F (36.4  C), temperature source Temporal, resp. rate 18, SpO2 95 %.  No intake or output data in the 24 hours ending 10/31/22 1633  EXAM:  GEN: Awake alert and oriented, appears stated age  PULM: Non-labored breathing with normal respiratory effort  CVS: reg rate and rhythm, no peripheral edema  ABD: Soft, tender over suprapubic area, distended. No rebound or guarding.  No peritoneal signs.   RECTAL: Rectal exam was deferred.  NEURO: CN II-XII grossly intact  MSK: extremeties with no clubbing, cyanosis or edema; able to ambulate  PSYCH: responsive, alert, cooperative; oriented x3; appropriate mood and affect  EXT/SKIN: inspection reveals no rashes, lesions or ulcers, normal coloring         Data Reviewed:     Results for orders placed or performed during the hospital encounter of 10/31/22   CT Abdomen Pelvis w Contrast    Narrative    CT ABDOMEN PELVIS W CONTRAST 10/31/2022 12:48 PM    CLINICAL HISTORY: periumbilical abdominal pain, diarrhea w/possible  melena, dark urine, fever and bilateral lower abdominal pain - concern  for appy vs diverticulitis, ? stone vs liver/pancreas/gallbladder  disease (significant ETOH use); RLQ abdominal pain; Dark urine;  Diarrhea, unspecified type; Dark stools    TECHNIQUE: CT scan of the abdomen and pelvis was performed  following  injection of IV contrast. Multiplanar reformats were obtained. Dose  reduction techniques were used.  CONTRAST: 90mL Isovue-370    COMPARISON: None.    FINDINGS:   LOWER CHEST: Unremarkable.    HEPATOBILIARY: Diffuse hepatic steatosis.    PANCREAS: Normal.    SPLEEN: Normal.    ADRENAL GLANDS: Normal.    KIDNEYS/BLADDER: No hydronephrosis.    BOWEL: Colonic diverticulosis with focal inflammation and  extraperitoneal gas in/adjacent to the mid sigmoid colon (series 2  image 171). Evidence of peritonitis without large volume  pneumoperitoneum, bowel obstruction or drainable fluid collection at  this time. Normal appendix.    PELVIC ORGANS: Normal.    ADDITIONAL FINDINGS: None.    MUSCULOSKELETAL: No acute bony abnormality.      Impression    IMPRESSION:   1.  Acute sigmoid diverticulitis with likely local/contained  perforation. No large volume pneumoperitoneum or drainable fluid  collection at this time. Consider colonoscopy after treatment if not  previously performed.  2.  Diffuse hepatic steatosis.    DAMIAN BECK MD         SYSTEM ID:  [id number]       Recent Labs   Lab 10/31/22  1223   WBC 24.1*   HGB 16.6   HCT 49.7   MCV 99        Recent Labs   Lab 10/31/22  1223      POTASSIUM 3.6   CHLORIDE 93*   CO2 27   ANIONGAP 16*   *   BUN 11.9   CR 1.12   GFRESTIMATED 90   HERI 9.1   PROTTOTAL 7.5   ALBUMIN 4.3   BILITOTAL 6.2*   ALKPHOS 88   AST 32   ALT 93*     No results for input(s): INR in the last 168 hours.  Recent Labs   Lab 10/31/22  1201   COLOR Orange*   APPEARANCE Clear   URINEGLC Negative   URINEBILI Small*   URINEKETONE Negative   SG 1.025   UBLD Small*   URINEPH 6.0   PROTEIN 300*   NITRITE Negative   LEUKEST Negative   RBCU 7*   WBCU 3         Assessment and Plan:   Tony Bonilla is a 32 year old male who presents with abdominal pain.  CT consistent with diverticulitis with contained perforation.  Abdomen soft with focal tenderness over suprapubic area.  WBC 24.1.   Lactic acid 1.  Temp improved to 97.5F.  HR improving.  No emergent surgery indicated. Continue with conservative medical management including bowel rest, IVF, and IV antibiotics. Briefly discussed intention to avoid urgent surgery as this would likely result in a resection with temporary stoma in setting of acute inflammation. Patient understands and agrees with the plan.  Recommend follow up colonoscopy in 6-8 weeks once acute episode resolves.       Plan:  1. Admit to hospitalist  2. Surgery: No emergent surgery indicated.  3. Diet: NPO, except ice chips  4. IV Fluids: As ordered  5. Antibiotics:  IV Zosyn  6. Medications: Home meds per hospitalist  7. I&O s:  strict I&O s  8. Labs:   - Reviewed  - Ordered: None   9. Imaging:   - Dr. White and myself have personally viewed: CT abd/pelvis  - Ordered:  None  10. Activity: ambulate as tolerated, encourage OOB  11. DVT prophylaxis: SCD s  12. This plan has been discussed with Dr. White.       Additional history obtained from patient and chart.  Time spent on consultation: 40 minutes, greater than 50 percent of the total encounter time is spent in counseling and/or coordination of care.          Sheryl Carter PA-C  [facility]  Phone:  [phone]

## 2022-10-31 NOTE — PROGRESS NOTES
The pt is aware of the providers message and has no further questions at this time.  Ceci Guaman on 10/31/2022 at 9:38 AM

## 2022-10-31 NOTE — ED NOTES
[facility]  ED Nurse Handoff Report    Tony Bonilla is a 32 year old male   ED Chief complaint: Abdominal Pain  . ED Diagnosis:   Final diagnoses:   None     Allergies:   Allergies   Allergen Reactions     Cefaclor GI Disturbance       Code Status: Full Code  Activity level - Baseline/Home:  Independent. Activity Level - Current:   Stand by Assist. Lift room needed: No. Bariatric: No   Needed: No   Isolation: No. Infection: Not Applicable.     Vital Signs:   Vitals:    10/31/22 1330 10/31/22 1333 10/31/22 1700   BP:  (!) 158/103    Pulse: 115     Resp: 18     Temp: 97.5  F (36.4  C)     TempSrc: Temporal     SpO2: 95%  95%       Cardiac Rhythm:  ,      Pain level:    Patient confused: No. Patient Falls Risk: No.   Elimination Status: Has voided   Patient Report - Initial Complaint: abd pain. Focused Assessment: pt sent her to for abx and diverticulitus    Tests Performed: see results. Abnormal Results: see results.  Labs Ordered and Resulted from Time of ED Arrival to Time of ED Departure   LACTIC ACID WHOLE BLOOD - Normal       Result Value    Lactic Acid 1.0     COVID-19 VIRUS (CORONAVIRUS) BY PCR - Normal    SARS CoV2 PCR Negative     BLOOD CULTURE   BLOOD CULTURE     No orders to display      Treatments provided: see MAR  Family Comments: family at bedside  OBS brochure/video discussed/provided to patient:  N/A  ED Medications:   Medications   piperacillin-tazobactam (ZOSYN) intermittent infusion 4.5 g (4.5 g Intravenous New Bag 10/31/22 1700)   piperacillin-tazobactam (ZOSYN) infusion 3.375 g (has no administration in time range)   morphine (PF) injection 4 mg (has no administration in time range)   0.9% sodium chloride BOLUS (1,000 mLs Intravenous New Bag 10/31/22 1612)   ketorolac (TORADOL) injection 15 mg (15 mg Intravenous Given 10/31/22 1613)   morphine (PF) injection 4 mg (4 mg Intravenous Given 10/31/22 1612)   ondansetron (ZOFRAN) injection 4 mg (4 mg Intravenous Given  10/31/22 1612)     Drips infusing:  No  For the majority of the shift, the patient's behavior Green. Interventions performed were n/a.    Sepsis treatment initiated: No     Patient tested for COVID 19 prior to admission: YES    ED Nurse Name/Phone Number: Ai Gardiner RN,   5:12 PM    RECEIVING UNIT ED HANDOFF REVIEW    Above ED Nurse Handoff Report was reviewed: Yes  Reviewed by: Carmel Grossman RN on November 1, 2022 at 4:25 PM

## 2022-10-31 NOTE — ED PROVIDER NOTES
History   Chief Complaint:  Abdominal Pain       The history is provided by the patient.      Tony Bonilla is a 32 year old male who presents with abdominal pain. Yesterday morning, about 36 hours prior to arrival, he developed periumbilical and suprapubic abdominal pain that radiates laterally. He had vomiting, diarrhea, and chills. He had a virtual visit because he was also concerned about a waxing and waning rash on his torso. At that visit he was prescribed Bactroban for the rash and referred to the [facility] for his abdominal pain. There he was found to be febrile to 101F. He was given 1g of Tylenol. He had a CT and laboratory studies, as below, prompting ER visit.    Lab results:   UA: 3 WBC/hpf, 7 RBC/hpf, negative nitrite  BMP: Na 136, K 3.6, Cl 93, CO2 27, anion gap 16, BUN 11.9, Cr 1.12, Ca 9.1, glucose 122, ALP 88, AST 32, ALT 93, albumin 4.3, bilirubin total 6.2  CRP: 246.32  WBC: 24.1  Hgb 16.6  Plt: 268    CT ABDOMEN PELVIS W CONTRAST 10/31/2022 12:48 PM:  1.  Acute sigmoid diverticulitis with likely local/contained   perforation. No large volume pneumoperitoneum or drainable fluid   collection at this time. Consider colonoscopy after treatment if not   previously performed.   2.  Diffuse hepatic steatosis.     Review of Systems   Constitutional: Positive for chills and fever.   Gastrointestinal: Positive for abdominal pain, diarrhea and vomiting.   Skin: Positive for rash.   All other systems reviewed and are negative.    Allergies:  Cefaclor    Medications:  The patient denies taking any routine medications    Past Medical History:     Alcohol dependence      Past Surgical History:    The patient denies past surgical history.      Family History:    Liver disease   Kidney disease     Social History:  Patient came from [facility] diagnostic center.  Patient is unaccompanied in the ED.    Physical Exam     Patient Vitals for the past 24 hrs:   BP Temp Temp src Pulse Resp SpO2    10/31/22 1715 (!) 168/104 -- -- -- -- --   10/31/22 1700 -- -- -- -- -- 95 %   10/31/22 1333 (!) 158/103 -- -- -- -- --   10/31/22 1330 -- 97.5  F (36.4  C) Temporal 115 18 95 %       Physical Exam  General: Well-developed and well-nourished. Well appearing young  man. Cooperative.  Head:  Atraumatic.  Eyes:  Conjunctivae, lids, and sclerae are normal.  Neck:  Supple. Normal range of motion.  CV:  Tachycardic rate and regular rhythm. Normal heart sounds with no murmurs, rubs, or gallops detected.  Resp:  No respiratory distress. Clear to auscultation bilaterally without decreased breath sounds, wheezing, rales, or rhonchi.  GI:  Soft. Non-distended.  Suprapubic tenderness.  No rigidity.   MS:  Normal ROM.   Skin:  Warm. Non-diaphoretic. No pallor.  Irritant erythematous rash with small areas of yellow crusting across the chest and anterior upper arms bilaterally.  Neuro: Awake. A&Ox3. Normal strength.  Psych:  Normal mood and affect. Normal speech.  Vitals reviewed.    Emergency Department Course     Laboratory:  Labs Ordered and Resulted from Time of ED Arrival to Time of ED Departure   LACTIC ACID WHOLE BLOOD - Normal       Result Value    Lactic Acid 1.0     COVID-19 VIRUS (CORONAVIRUS) BY PCR - Normal    SARS CoV2 PCR Negative     BLOOD CULTURE   BLOOD CULTURE      Emergency Department Course:  Reviewed:  I reviewed nursing notes, vitals, past medical history, and Care Everywhere.    Assessments:  1422 I obtained history and examined the patient as noted above.     Consults:  1455 I consulted with Dr. Celis, hospitalist, about the patient and plan of care.  1505 I consulted with the ANA Carter with colorectal surgery about the patient and plan of care.  1629 I consulted with the ANA Carter again after her evaluation of the patient and the plan of care.    Interventions:  1612 Morphine 4 mg IV  1612 NS 1000 mL IV  1612 Zofran 4 mg IV  1613 Toradol 15 mg IV  1700 Zosyn 4.5 g  IV    Disposition:  The patient was admitted to the hospital under the care of Dr. Celis.     Impression & Plan   Medical Decision Making:  Tony is a 32 year old man presenting with 36 hours of abdominal pain with vomiting and diarrhea.  At the acute diagnostic center he was found to be febrile and his work-up revealed leukocytosis of 24.1 with CT showing acute sigmoid diverticulitis with likely local/contained perforation but no abscess.  He was given Tylenol and does feel overall improved by the time of emergency department visit.  He appears well but he does have suprapubic tenderness.  He is tachycardic.  He also has incidental rash across his upper chest and arms that is not vesicular or urticarial.  There does appear to be a small area of impetigo on the left chest and he was prescribed Bactroban at a virtual visit today.  I reviewed all of his labs from the diagnostic center which are also remarkable for mildly elevated ALT at 93 and hyperbilirubinemia of 6.2 of uncertain clinical significance.  His CRP is elevated at 246, as expected.    Fortunately, although there is signs of sepsis (tachycardia, fever, leukocytosis), there is no evidence of severe sepsis or septic shock with a normal lactate of 1.0.  Blood cultures were obtained and he was empirically treated with Zosyn.  His symptoms were improved with morphine, IV fluids, Zofran, and Toradol.  He understands plan for admission for colorectal consultation and IV antibiotics.  All of his questions were answered.  I spoke with THANIA Carter with colorectal surgery who was able to evaluate the patient in the emergency department.  She has no further orders at this time.  I discussed the patient's case with Dr. Celis, hospitalist, who accepts admission and has no further orders.    Diagnosis:    ICD-10-CM    1. Sepsis without acute organ dysfunction, due to unspecified organism (H)  A41.9       2. Sigmoid diverticulitis  K57.32     Likely contained  perforation      3. Hepatic steatosis  K76.0           Scribe Disclosure:  I, Car Veloz, am serving as a scribe at 2:14 PM on 10/31/2022 to document services personally performed by Niki Herndon MD based on my observations and the provider's statements to me.        Niki Herndon MD  11/01/22 0793

## 2022-11-01 LAB
ERYTHROCYTE [DISTWIDTH] IN BLOOD BY AUTOMATED COUNT: 12.7 % (ref 10–15)
HCT VFR BLD AUTO: 42.9 % (ref 40–53)
HGB BLD-MCNC: 13.7 G/DL (ref 13.3–17.7)
MCH RBC QN AUTO: 32.5 PG (ref 26.5–33)
MCHC RBC AUTO-ENTMCNC: 31.9 G/DL (ref 31.5–36.5)
MCV RBC AUTO: 102 FL (ref 78–100)
PLATELET # BLD AUTO: 244 10E3/UL (ref 150–450)
RBC # BLD AUTO: 4.21 10E6/UL (ref 4.4–5.9)
WBC # BLD AUTO: 18.4 10E3/UL (ref 4–11)

## 2022-11-01 PROCEDURE — 36415 COLL VENOUS BLD VENIPUNCTURE: CPT | Performed by: INTERNAL MEDICINE

## 2022-11-01 PROCEDURE — 250N000011 HC RX IP 250 OP 636: Performed by: HOSPITALIST

## 2022-11-01 PROCEDURE — 99232 SBSQ HOSP IP/OBS MODERATE 35: CPT | Performed by: INTERNAL MEDICINE

## 2022-11-01 PROCEDURE — 250N000011 HC RX IP 250 OP 636: Performed by: INTERNAL MEDICINE

## 2022-11-01 PROCEDURE — 85027 COMPLETE CBC AUTOMATED: CPT | Performed by: INTERNAL MEDICINE

## 2022-11-01 PROCEDURE — 120N000001 HC R&B MED SURG/OB

## 2022-11-01 PROCEDURE — 258N000003 HC RX IP 258 OP 636: Performed by: INTERNAL MEDICINE

## 2022-11-01 PROCEDURE — 250N000013 HC RX MED GY IP 250 OP 250 PS 637: Performed by: INTERNAL MEDICINE

## 2022-11-01 PROCEDURE — 250N000011 HC RX IP 250 OP 636: Performed by: EMERGENCY MEDICINE

## 2022-11-01 RX ORDER — MUPIROCIN 20 MG/G
OINTMENT TOPICAL 3 TIMES DAILY
Status: DISCONTINUED | OUTPATIENT
Start: 2022-11-01 | End: 2022-11-03 | Stop reason: HOSPADM

## 2022-11-01 RX ORDER — BETAMETHASONE DIPROPIONATE 0.5 MG/G
CREAM TOPICAL 2 TIMES DAILY
Status: DISCONTINUED | OUTPATIENT
Start: 2022-11-01 | End: 2022-11-01 | Stop reason: CLARIF

## 2022-11-01 RX ORDER — NALOXONE HYDROCHLORIDE 0.4 MG/ML
0.4 INJECTION, SOLUTION INTRAMUSCULAR; INTRAVENOUS; SUBCUTANEOUS
Status: DISCONTINUED | OUTPATIENT
Start: 2022-11-01 | End: 2022-11-03 | Stop reason: HOSPADM

## 2022-11-01 RX ORDER — NALOXONE HYDROCHLORIDE 0.4 MG/ML
0.2 INJECTION, SOLUTION INTRAMUSCULAR; INTRAVENOUS; SUBCUTANEOUS
Status: DISCONTINUED | OUTPATIENT
Start: 2022-11-01 | End: 2022-11-03 | Stop reason: HOSPADM

## 2022-11-01 RX ORDER — DIPHENHYDRAMINE HYDROCHLORIDE 50 MG/ML
25 INJECTION INTRAMUSCULAR; INTRAVENOUS EVERY 6 HOURS PRN
Status: DISCONTINUED | OUTPATIENT
Start: 2022-11-01 | End: 2022-11-03 | Stop reason: HOSPADM

## 2022-11-01 RX ORDER — BETAMETHASONE DIPROPIONATE 0.5 MG/G
OINTMENT, AUGMENTED TOPICAL 2 TIMES DAILY
Status: DISCONTINUED | OUTPATIENT
Start: 2022-11-01 | End: 2022-11-03 | Stop reason: HOSPADM

## 2022-11-01 RX ORDER — DICYCLOMINE HCL 20 MG
20 TABLET ORAL ONCE
Status: COMPLETED | OUTPATIENT
Start: 2022-11-01 | End: 2022-11-01

## 2022-11-01 RX ORDER — BETAMETHASONE DIPROPIONATE 0.5 MG/G
OINTMENT, AUGMENTED TOPICAL 2 TIMES DAILY
Status: DISCONTINUED | OUTPATIENT
Start: 2022-11-01 | End: 2022-11-01 | Stop reason: CLARIF

## 2022-11-01 RX ORDER — BETAMETHASONE DIPROPIONATE 0.5 MG/G
CREAM TOPICAL 2 TIMES DAILY
Status: DISCONTINUED | OUTPATIENT
Start: 2022-11-01 | End: 2022-11-01

## 2022-11-01 RX ADMIN — SODIUM CHLORIDE, POTASSIUM CHLORIDE, SODIUM LACTATE AND CALCIUM CHLORIDE: 600; 310; 30; 20 INJECTION, SOLUTION INTRAVENOUS at 13:40

## 2022-11-01 RX ADMIN — MORPHINE SULFATE 4 MG: 4 INJECTION INTRAVENOUS at 07:33

## 2022-11-01 RX ADMIN — TAZOBACTAM SODIUM AND PIPERACILLIN SODIUM 3.38 G: 375; 3 INJECTION, SOLUTION INTRAVENOUS at 00:44

## 2022-11-01 RX ADMIN — MORPHINE SULFATE 4 MG: 4 INJECTION INTRAVENOUS at 03:38

## 2022-11-01 RX ADMIN — MORPHINE SULFATE 4 MG: 4 INJECTION INTRAVENOUS at 23:51

## 2022-11-01 RX ADMIN — MORPHINE SULFATE 4 MG: 4 INJECTION INTRAVENOUS at 20:06

## 2022-11-01 RX ADMIN — TAZOBACTAM SODIUM AND PIPERACILLIN SODIUM 3.38 G: 375; 3 INJECTION, SOLUTION INTRAVENOUS at 14:50

## 2022-11-01 RX ADMIN — MORPHINE SULFATE 4 MG: 4 INJECTION INTRAVENOUS at 11:29

## 2022-11-01 RX ADMIN — TAZOBACTAM SODIUM AND PIPERACILLIN SODIUM 3.38 G: 375; 3 INJECTION, SOLUTION INTRAVENOUS at 20:07

## 2022-11-01 RX ADMIN — TAZOBACTAM SODIUM AND PIPERACILLIN SODIUM 3.38 G: 375; 3 INJECTION, SOLUTION INTRAVENOUS at 09:04

## 2022-11-01 RX ADMIN — DICYCLOMINE HYDROCHLORIDE 20 MG: 20 TABLET ORAL at 13:44

## 2022-11-01 RX ADMIN — SODIUM CHLORIDE, POTASSIUM CHLORIDE, SODIUM LACTATE AND CALCIUM CHLORIDE: 600; 310; 30; 20 INJECTION, SOLUTION INTRAVENOUS at 03:38

## 2022-11-01 RX ADMIN — DIPHENHYDRAMINE HYDROCHLORIDE 25 MG: 50 INJECTION, SOLUTION INTRAMUSCULAR; INTRAVENOUS at 19:19

## 2022-11-01 ASSESSMENT — ACTIVITIES OF DAILY LIVING (ADL)
ADLS_ACUITY_SCORE: 18
ADLS_ACUITY_SCORE: 35
ADLS_ACUITY_SCORE: 18
ADLS_ACUITY_SCORE: 35
ADLS_ACUITY_SCORE: 18
ADLS_ACUITY_SCORE: 35

## 2022-11-01 NOTE — PHARMACY-ADMISSION MEDICATION HISTORY
Admission medication history interview status for this patient is complete. See [facility] admission navigator for allergy information, prior to admission medications and immunization status.     Medication history interview source(s):Patient  Medication history resources (including written lists, pill bottles, clinic record):[facility] list, Sure scripts  Primary pharmacy:[facility]    Changes made to PTA medication list:  Added: tylenol, ibuprofen  Deleted: clinic tylenol dose, ns flush  Changed: ----    Actions taken by pharmacist (provider contacted, etc):None     Additional medication history information:pt was prescribed betamethasone for poision ivy, but then told to hold it until bactroban cream to site complete.  MD believes gene sit is infected    Medication reconciliation/reorder completed by provider prior to medication history? No      For patients on insulin therapy:N    Prior to Admission medications    Medication Sig Last Dose Taking? Auth Provider Long Term End Date   acetaminophen (TYLENOL) 325 MG tablet Take 650 mg by mouth every 6 hours as needed for mild pain Past Week Yes Unknown, Entered By History     ibuprofen (ADVIL/MOTRIN) 200 MG tablet Take 400 mg by mouth every 6 hours as needed for pain 10/31/2022 Yes Unknown, Entered By History     mupirocin (BACTROBAN) 2 % external ointment Apply topically 3 times daily 10/31/2022 at afteroon Yes Kathy Caldera APRN CNP     augmented betamethasone dipropionate (DIPROLENE-AF) 0.05 % external ointment Apply topically 2 times daily  at new rx, has not started yet  Kathy Cladera APRN CNP

## 2022-11-01 NOTE — PROGRESS NOTES
Provider paged: Please call regarding this patient.    Would like to discuss treatment for rash that patient had outpatient but the medications are not in our MAR.     Also patient had new pink/red rash that covers entire back.    Addendum:    Provider to patients room. New orders for topical cream for chest rash. PRN orders for benadryl.

## 2022-11-01 NOTE — PROGRESS NOTES
Care Management Discharge Note    Discharge Date: 11/02/2022       Additional Information:  Patient has a [facility] PCP. No service bundle has been assigned. No needs identified while in the ED. Please consult care management if needs arise.     YANET Trejo, [facility]  Emergency Room   [phone]-Please contact the SW on the floor in which the patient is staying for any questions or concerns

## 2022-11-01 NOTE — PLAN OF CARE
"Pt. A&O, SBA for mobility, has some pain in his abdominal area PRN Morphine was given, voiding adequately, on clear liquid diet, on IV LR- 100 ml/hr continuous, CMS intact.      /81 (BP Location: Left arm, Cuff Size: Adult Large)   Pulse 87   Temp 98.6  F (37  C) (Oral)   Resp 18   Ht 1.778 m (5' 10\")   SpO2 97%   BMI 34.87 kg/m         "

## 2022-11-01 NOTE — PLAN OF CARE
Pt Temp was 103.1. Hospitalist daria PICHARDO ordered Tylenol. Medication administered. Pt is on IVF LR @ 100ml/hr. Will continue to monitor and assess pt.

## 2022-11-01 NOTE — PROGRESS NOTES
"[facility]  Hospitalist Progress Note     Assessment & Plan     ASSESSMENT:    32M with no remarkable PMH presents with abdominal pain and found to have sepsis 2/2 diverticulitis w/ contained perforation.    PLAN:    -Sepsis 2/2 Diverticulitis w/ Contained Perforation: Zosyn. IVF. CLD. Colorectal surgery following.    -Alcohol Abuse w/ Hepatic Steatosis:  alcohol cessation.    -DVT Prophy: SCDs.    -Disposition: 2-3 days to home depending on clinical course.      Homer Olmos MD    Subjective     Seen at bedside. Abdominal pain is minimal but says he is having a lot of abdominal bloating.        Objective   Blood pressure 133/83, pulse 90, temperature 100.3  F (37.9  C), resp. rate 18, height 1.778 m (5' 10\"), SpO2 97 %.    PHYSICAL EXAM  General: In no acute distress  CV: RRR.  Lungs: CTAB. Nl WOB.  Abd: Mild xochitl-umbilical tenderness. No rebound or gaurding.  Ext: No edema.    LABS AND IMAGING: Reviewed and pertinent results discussed in assessment and plan.    "

## 2022-11-01 NOTE — PROGRESS NOTES
COLON & RECTAL SURGERY  PROGRESS NOTE    November 1, 2022    SUBJECTIVE:  Patient reports he feels about the same.  Continues to have pain in lower mid abdomen.  Tried some clear liquids this morning.  No nausea.      OBJECTIVE:  Temp:  [97.5  F (36.4  C)-103.1  F (39.5  C)] 100.3  F (37.9  C)  Pulse:  [] 90  Resp:  [18] 18  BP: (133-168)/() 133/83  Cuff Mean (mmHg):  [94] 94  SpO2:  [95 %-97 %] 97 %  No intake or output data in the 24 hours ending 11/01/22 0851    GENERAL:  Awake, alert, no acute distress, lying in bed.  HEAD: Nomocephalic atraumatic  SCLERA: anicteric  EXTREMITIES: warm and well perfused  ABDOMEN:  Soft, focal tenderness in suprapubic area, round, no rebound or guarding, no peritoneal signs.    LABS:  Lab Results   Component Value Date    WBC 18.4 11/01/2022     Lab Results   Component Value Date    HGB 13.7 11/01/2022     Lab Results   Component Value Date    HCT 42.9 11/01/2022     Lab Results   Component Value Date     11/01/2022     Last Basic Metabolic Panel:  Lab Results   Component Value Date     10/31/2022      Lab Results   Component Value Date    POTASSIUM 3.6 10/31/2022     Lab Results   Component Value Date    CHLORIDE 93 10/31/2022     Lab Results   Component Value Date    HERI 9.1 10/31/2022     Lab Results   Component Value Date    CO2 27 10/31/2022     Lab Results   Component Value Date    BUN 11.9 10/31/2022     Lab Results   Component Value Date    CR 1.12 10/31/2022     Lab Results   Component Value Date     10/31/2022       ASSESSMENT/PLAN:  32 year old man admitted with perforated diverticulitis.  Leukocytosis trending down.  Tachycardia improved.    - Clear liquids  - Continue IV antibiotics  - Pain management as needed  - Encourage ambulation  - No plans for surgery at this time      For questions/paging, please contact the CRS office at [phone].    Sheryl Carter PA-C  [facility]  Phone: [phone]

## 2022-11-01 NOTE — PLAN OF CARE
Pt is alert and oriented x4. Pt was given Morphine for abdominal pain. No acute distress noted VSS. Pt is on IVF LR @ 100ml/hr. Pt is on clear liquid diet. Pt denies nausea or vomiting. Pt was given his antibiotics Zosyn during the shift. Pt is independent in transfer and cares. Pt is sleeping in bed and call light within reach.

## 2022-11-02 ENCOUNTER — TELEPHONE (OUTPATIENT)
Dept: PEDIATRICS | Facility: CLINIC | Age: 32
End: 2022-11-02

## 2022-11-02 LAB
ANION GAP SERPL CALCULATED.3IONS-SCNC: 12 MMOL/L (ref 7–15)
BACTERIA UR CULT: NO GROWTH
BUN SERPL-MCNC: 9.7 MG/DL (ref 6–20)
CALCIUM SERPL-MCNC: 9.1 MG/DL (ref 8.6–10)
CHLORIDE SERPL-SCNC: 100 MMOL/L (ref 98–107)
CREAT SERPL-MCNC: 0.98 MG/DL (ref 0.67–1.17)
DEPRECATED HCO3 PLAS-SCNC: 27 MMOL/L (ref 22–29)
ERYTHROCYTE [DISTWIDTH] IN BLOOD BY AUTOMATED COUNT: 12.5 % (ref 10–15)
GFR SERPL CREATININE-BSD FRML MDRD: >90 ML/MIN/1.73M2
GLUCOSE SERPL-MCNC: 93 MG/DL (ref 70–99)
HCT VFR BLD AUTO: 45.4 % (ref 40–53)
HGB BLD-MCNC: 14.8 G/DL (ref 13.3–17.7)
MCH RBC QN AUTO: 32.7 PG (ref 26.5–33)
MCHC RBC AUTO-ENTMCNC: 32.6 G/DL (ref 31.5–36.5)
MCV RBC AUTO: 100 FL (ref 78–100)
PLATELET # BLD AUTO: 304 10E3/UL (ref 150–450)
POTASSIUM SERPL-SCNC: 3.8 MMOL/L (ref 3.4–5.3)
RBC # BLD AUTO: 4.52 10E6/UL (ref 4.4–5.9)
SODIUM SERPL-SCNC: 139 MMOL/L (ref 136–145)
WBC # BLD AUTO: 12.7 10E3/UL (ref 4–11)

## 2022-11-02 PROCEDURE — 258N000003 HC RX IP 258 OP 636: Performed by: HOSPITALIST

## 2022-11-02 PROCEDURE — 36415 COLL VENOUS BLD VENIPUNCTURE: CPT | Performed by: INTERNAL MEDICINE

## 2022-11-02 PROCEDURE — 80048 BASIC METABOLIC PNL TOTAL CA: CPT | Performed by: INTERNAL MEDICINE

## 2022-11-02 PROCEDURE — 250N000011 HC RX IP 250 OP 636: Performed by: INTERNAL MEDICINE

## 2022-11-02 PROCEDURE — 120N000001 HC R&B MED SURG/OB

## 2022-11-02 PROCEDURE — 250N000011 HC RX IP 250 OP 636: Performed by: EMERGENCY MEDICINE

## 2022-11-02 PROCEDURE — 85027 COMPLETE CBC AUTOMATED: CPT | Performed by: INTERNAL MEDICINE

## 2022-11-02 PROCEDURE — 99232 SBSQ HOSP IP/OBS MODERATE 35: CPT | Performed by: INTERNAL MEDICINE

## 2022-11-02 RX ADMIN — TAZOBACTAM SODIUM AND PIPERACILLIN SODIUM 3.38 G: 375; 3 INJECTION, SOLUTION INTRAVENOUS at 22:28

## 2022-11-02 RX ADMIN — TAZOBACTAM SODIUM AND PIPERACILLIN SODIUM 3.38 G: 375; 3 INJECTION, SOLUTION INTRAVENOUS at 02:54

## 2022-11-02 RX ADMIN — BETAMETHASONE DIPROPIONATE: 0.5 OINTMENT, AUGMENTED TOPICAL at 22:28

## 2022-11-02 RX ADMIN — MORPHINE SULFATE 4 MG: 4 INJECTION INTRAVENOUS at 23:44

## 2022-11-02 RX ADMIN — SODIUM CHLORIDE, POTASSIUM CHLORIDE, SODIUM LACTATE AND CALCIUM CHLORIDE: 600; 310; 30; 20 INJECTION, SOLUTION INTRAVENOUS at 18:19

## 2022-11-02 RX ADMIN — MORPHINE SULFATE 4 MG: 4 INJECTION INTRAVENOUS at 11:40

## 2022-11-02 RX ADMIN — MORPHINE SULFATE 4 MG: 4 INJECTION INTRAVENOUS at 08:46

## 2022-11-02 RX ADMIN — BETAMETHASONE DIPROPIONATE: 0.5 OINTMENT, AUGMENTED TOPICAL at 11:28

## 2022-11-02 RX ADMIN — TAZOBACTAM SODIUM AND PIPERACILLIN SODIUM 3.38 G: 375; 3 INJECTION, SOLUTION INTRAVENOUS at 14:46

## 2022-11-02 RX ADMIN — TAZOBACTAM SODIUM AND PIPERACILLIN SODIUM 3.38 G: 375; 3 INJECTION, SOLUTION INTRAVENOUS at 08:44

## 2022-11-02 ASSESSMENT — ACTIVITIES OF DAILY LIVING (ADL)
ADLS_ACUITY_SCORE: 18

## 2022-11-02 NOTE — PROGRESS NOTES
"[facility]  Hospitalist Progress Note     Assessment & Plan     ASSESSMENT:    32M with hx of alcohol abuse presents with abdominal pain and found to have sepsis 2/2 diverticulitis w/ contained perforation. Colorectal surgery and recommended conservative management and patient making interval improvements on IV antibiotics.    PLAN:    -Sepsis 2/2 Diverticulitis w/ Contained Perforation: Zosyn. IVF. FLD. Pain control. Colorectal surgery following.    -Alcohol Abuse w/ Hepatic Steatosis:  alcohol cessation.    -DVT Prophy: SCDs.    -Disposition: 1-2 days to home depending on clinical course.      Homer Olmos MD    Subjective     Fever curve trending down. WBC trending down. Pain and bloating are improved. Colorectal surgery advancing diet to full liquids.        Objective   Blood pressure 138/79, pulse 82, temperature 97.8  F (36.6  C), temperature source Oral, resp. rate 18, height 1.778 m (5' 10\"), weight 108.5 kg (239 lb 3.2 oz), SpO2 99 %.    PHYSICAL EXAM  General: In no acute distress  CV: RRR.  Lungs: CTAB. Nl WOB.  Abd: Mild xochitl-umbilical tenderness. No rebound or gaurding.  Ext: No edema.    LABS AND IMAGING: Reviewed and pertinent results discussed in assessment and plan.    "

## 2022-11-02 NOTE — TELEPHONE ENCOUNTER
Please help set up in person hospital visit with me next week. Could do Thursday during my Sb[facility] visits.

## 2022-11-02 NOTE — PLAN OF CARE
Goal Outcome Evaluation:       VSS, afebrile. A&Ox4, up ad ovidio. Patient complaining of abdominal pain in LLQ, being managed with IV morphine. Colorectal following. Patient advanced to full liquid diet. Patient had hives on chest PTA being managed with prescribed home cream in med box. Warm, red rash across back, MD aware, questioned heat rash. No new orders.

## 2022-11-02 NOTE — PROGRESS NOTES
Admit from ED this evening. Patient alert and oriented. Up independently. PRN benadryl for itching. Reports abdominal pain, prn med not available in [facility] pharmacy sending. IVF infusing. Clear liquid diet. Rash to chest and back. Provider to patients room to address. See this writers previous note regarding. Will continue to monitor per plan of care.        Request sent to PCP

## 2022-11-02 NOTE — PLAN OF CARE
"Goal Outcome Evaluation:    Vitals: /84 (BP Location: Right arm, Patient Position: Supine, Cuff Size: Adult Large)   Pulse 82   Temp 98.4  F (36.9  C) (Oral)   Resp 20   Ht 1.778 m (5' 10\")   Wt 108.5 kg (239 lb 3.2 oz)   SpO2 97%   BMI 34.32 kg/m      Primary DX: Sigmoid Diverticulitis /Sepsis  Orientation: A&Ox4. Speech clear. Uses call light appropriately.   Pain: Rated pain 7/10 in ABD. PRN Morphine effective for pain management.   Respiratory: LS clear throughout. No cough or TOLENTINO.   Bowel Sounds: +X4Q. ABD tender to touch.   GI/: No bm this shift. Continent urine.   Skin: Rash on mid to upper chest on to upper R arm. Also has rash on his back that is red and blanchable. Has augmented betamethasone dipropionate (DIPROLENE-AF) 0.05 % ointment  for the rash.   LDA: L PIV infusing LR @ 50mL/hr.  IV Zosyn  Diet: Clear Liquid   Activity: Indep in room. Steady.   Followed By/Consults: Colorectal  Plan: 2-3 days to home depending on clinical course per MD note 11/1/2022                        "

## 2022-11-02 NOTE — PROGRESS NOTES
COLON & RECTAL SURGERY  PROGRESS NOTE    November 2, 2022    SUBJECTIVE:  Patient feeling a little better.  Passing some gas, but still feels bloated.  Tolerating clears without nausea.    OBJECTIVE:  Temp:  [97.8  F (36.6  C)-99.6  F (37.6  C)] 97.8  F (36.6  C)  Pulse:  [82-98] 82  Resp:  [18-20] 18  BP: (134-154)/(74-88) 138/79  SpO2:  [95 %-99 %] 99 %    Intake/Output Summary (Last 24 hours) at 11/2/2022 0833  Last data filed at 11/1/2022 1200  Gross per 24 hour   Intake 100 ml   Output --   Net 100 ml       GENERAL:  Awake, alert, no acute distress, lying in bed.  HEAD: Nomocephalic atraumatic  SCLERA: anicteric  EXTREMITIES: warm and well perfused  ABDOMEN:  Soft, tender in LLQ, non-distended, no rebound or guarding, no peritoneal signs.    LABS:  Lab Results   Component Value Date    WBC 12.7 11/02/2022     Lab Results   Component Value Date    HGB 14.8 11/02/2022     Lab Results   Component Value Date    HCT 45.4 11/02/2022     Lab Results   Component Value Date     11/02/2022     Last Basic Metabolic Panel:  Lab Results   Component Value Date     11/02/2022      Lab Results   Component Value Date    POTASSIUM 3.8 11/02/2022     Lab Results   Component Value Date    CHLORIDE 100 11/02/2022     Lab Results   Component Value Date    HERI 9.1 11/02/2022     Lab Results   Component Value Date    CO2 27 11/02/2022     Lab Results   Component Value Date    BUN 9.7 11/02/2022     Lab Results   Component Value Date    CR 0.98 11/02/2022     Lab Results   Component Value Date    GLC 93 11/02/2022       ASSESSMENT/PLAN: 32 year old man admitted with perforated diverticulitis.  Leukocytosis trending down.     - Full liquids  - Continue IV antibiotics  - Pain management as needed, minimize narcotics  - Encourage ambulation  - No plans for surgery at this time        For questions/paging, please contact the CRS office at [phone].    Sheryl Carter PA-C  [facility]  Phone:  [phone]      Colon and Rectal Surgery Attending Note    Patient seen and examined independently.  Agree with above assessment and plan.  Feeling better. + flatus. tolearting clears without nausea.  abd soft with focal LLQ tenderness  Plan as above.   Will need a colonoscopy in 6-8 weeks.     Colette White MD  [facility]  [address], [address]  [place] [address]  T: [phone]  F: [phone]   [email or web address]

## 2022-11-03 VITALS
RESPIRATION RATE: 18 BRPM | TEMPERATURE: 98.1 F | HEART RATE: 67 BPM | OXYGEN SATURATION: 97 % | BODY MASS INDEX: 34.24 KG/M2 | SYSTOLIC BLOOD PRESSURE: 138 MMHG | DIASTOLIC BLOOD PRESSURE: 92 MMHG | HEIGHT: 70 IN | WEIGHT: 239.2 LBS

## 2022-11-03 LAB
ANION GAP SERPL CALCULATED.3IONS-SCNC: 11 MMOL/L (ref 7–15)
BUN SERPL-MCNC: 10.6 MG/DL (ref 6–20)
CALCIUM SERPL-MCNC: 9.1 MG/DL (ref 8.6–10)
CHLORIDE SERPL-SCNC: 99 MMOL/L (ref 98–107)
CREAT SERPL-MCNC: 1.03 MG/DL (ref 0.67–1.17)
DEPRECATED HCO3 PLAS-SCNC: 27 MMOL/L (ref 22–29)
ERYTHROCYTE [DISTWIDTH] IN BLOOD BY AUTOMATED COUNT: 12.4 % (ref 10–15)
GFR SERPL CREATININE-BSD FRML MDRD: >90 ML/MIN/1.73M2
GLUCOSE SERPL-MCNC: 90 MG/DL (ref 70–99)
HCT VFR BLD AUTO: 45.3 % (ref 40–53)
HGB BLD-MCNC: 14.8 G/DL (ref 13.3–17.7)
MCH RBC QN AUTO: 32.7 PG (ref 26.5–33)
MCHC RBC AUTO-ENTMCNC: 32.7 G/DL (ref 31.5–36.5)
MCV RBC AUTO: 100 FL (ref 78–100)
PLATELET # BLD AUTO: 346 10E3/UL (ref 150–450)
POTASSIUM SERPL-SCNC: 4.1 MMOL/L (ref 3.4–5.3)
RBC # BLD AUTO: 4.52 10E6/UL (ref 4.4–5.9)
SODIUM SERPL-SCNC: 137 MMOL/L (ref 136–145)
WBC # BLD AUTO: 12 10E3/UL (ref 4–11)

## 2022-11-03 PROCEDURE — 250N000013 HC RX MED GY IP 250 OP 250 PS 637: Performed by: INTERNAL MEDICINE

## 2022-11-03 PROCEDURE — 36415 COLL VENOUS BLD VENIPUNCTURE: CPT | Performed by: INTERNAL MEDICINE

## 2022-11-03 PROCEDURE — 80048 BASIC METABOLIC PNL TOTAL CA: CPT | Performed by: INTERNAL MEDICINE

## 2022-11-03 PROCEDURE — 93005 ELECTROCARDIOGRAM TRACING: CPT

## 2022-11-03 PROCEDURE — 99239 HOSP IP/OBS DSCHRG MGMT >30: CPT | Performed by: HOSPITALIST

## 2022-11-03 PROCEDURE — 93010 ELECTROCARDIOGRAM REPORT: CPT | Performed by: INTERNAL MEDICINE

## 2022-11-03 PROCEDURE — 250N000011 HC RX IP 250 OP 636: Performed by: EMERGENCY MEDICINE

## 2022-11-03 PROCEDURE — 85027 COMPLETE CBC AUTOMATED: CPT | Performed by: INTERNAL MEDICINE

## 2022-11-03 RX ORDER — CIPROFLOXACIN 500 MG/1
500 TABLET, FILM COATED ORAL 2 TIMES DAILY
Qty: 20 TABLET | Refills: 0 | Status: SHIPPED | OUTPATIENT
Start: 2022-11-03 | End: 2022-11-11

## 2022-11-03 RX ORDER — METRONIDAZOLE 500 MG/1
500 TABLET ORAL 2 TIMES DAILY
Qty: 20 TABLET | Refills: 0 | Status: SHIPPED | OUTPATIENT
Start: 2022-11-03 | End: 2022-11-11

## 2022-11-03 RX ADMIN — TAZOBACTAM SODIUM AND PIPERACILLIN SODIUM 3.38 G: 375; 3 INJECTION, SOLUTION INTRAVENOUS at 10:08

## 2022-11-03 RX ADMIN — ACETAMINOPHEN 650 MG: 325 TABLET, FILM COATED ORAL at 10:06

## 2022-11-03 RX ADMIN — BETAMETHASONE DIPROPIONATE: 0.5 OINTMENT, AUGMENTED TOPICAL at 13:17

## 2022-11-03 RX ADMIN — TAZOBACTAM SODIUM AND PIPERACILLIN SODIUM 3.38 G: 375; 3 INJECTION, SOLUTION INTRAVENOUS at 04:02

## 2022-11-03 ASSESSMENT — ACTIVITIES OF DAILY LIVING (ADL)
ADLS_ACUITY_SCORE: 18

## 2022-11-03 NOTE — PLAN OF CARE
[facility] [id number]: A&O. VSS. C/o mild abdominal pain- declined intervention. Tolerating Full liquid diet. LR infusing @ 50 ml/hr. IV Zosyn. Independent.

## 2022-11-03 NOTE — PLAN OF CARE
Goal Outcome Evaluation:       VSS, afebrile. A&Ox4, up ad ovidio. LR discontinued, saline locked. Tolerating low fiber diet. Active bowel sounds. Patient reporting 3/10 pain, down from 7/10 prior. Up and ambulating independently. Rash being managed with steroid cream, self-applied.

## 2022-11-03 NOTE — PROGRESS NOTES
"[facility]    Hospitalist Progress Note             Date of Admission:  10/31/2022                   Day of hospitalization: 3    Assessment and Plan:   32M with hx of alcohol abuse presents with abdominal pain and found to have sepsis 2/2 diverticulitis w/ contained perforation. Colorectal surgery and recommended conservative management and patient making interval improvements on IV antibiotics.       Acute perforated diverticulitis  Sepsis 2/2 Diverticulitis w/ Contained Perforation:   -Continue IV Zosyn suspect 12 switch to oral regimen tomorrow if continues to improve    -Alcohol Abuse w/ Hepatic Steatosis:  alcohol cessation.          # Code status: Full   # Anticipated discharge date and Disposition: in 1 day  # DVT: SCDS  # IVF: none                       Brenda Neumann MD  Text Page (7am - 6pm, M-F)               Subjective   Chief Complaint:  Abdominal pain  Subjective: Feels well tolerating soft diet states he had abdominal pain which is about 3 out of 10 has had bowel movement as well yesterday evening.  No nausea no vomiting no chest pain no shortness of breath       Objective   BP (!) 138/92 (BP Location: Right arm, Patient Position: Supine, Cuff Size: Adult Regular)   Pulse 67   Temp 98.1  F (36.7  C) (Oral)   Resp 18   Ht 1.778 m (5' 10\")   Wt 108.5 kg (239 lb 3.2 oz)   SpO2 97%   BMI 34.32 kg/m       Physical Exam  General: Pt in NAD, normal appearance  HEENT: OP clear MMM, no JVD  Lungs: Clear to Auscultation Bilateral, normal breathing  without accessory muscle usage, no wheezing, rhonchi or crackles  Cardiac: +S1, S2, RRR, no MRG, no edema  Abdominal: normal bowel sounds, NT/ND  Skin: warm, dry, normal turgor, no rash  Psyche: A& O x3, appropriate affect             Intake/Output Summary (Last 24 hours) at 11/3/2022 1147  Last data filed at 11/2/2022 1502  Gross per 24 hour   Intake 400 ml   Output --   Net 400 ml           Labs and Imaging Results:      Recent " Labs   Lab 11/03/22  0655 11/02/22  0720   WBC 12.0* 12.7*   HGB 14.8 14.8    304        Recent Labs   Lab 11/03/22  0655 11/02/22  0720    139   CO2 27 27   BUN 10.6 9.7      No results for input(s): INR, PTT in the last 168 hours.   No results for input(s): CKMB in the last 168 hours.    Invalid input(s): TROPONINT     Recent Labs   Lab 10/31/22  1223   ALBUMIN 4.3   AST 32   ALT 93*   ALKPHOS 88        Micro:     Radio:  No orders to display           Medications:      Scheduled Meds:      augmented betamethasone dipropionate   Topical BID     [Held by provider] mupirocin   Topical TID     piperacillin-tazobactam  3.375 g Intravenous Q6H     sodium chloride (PF)  3 mL Intracatheter Q8H     Continuous Infusions:      lactated ringers 50 mL/hr at 11/03/22 1121     PRN Meds:  acetaminophen **OR** acetaminophen, diphenhydrAMINE, lidocaine 4%, lidocaine (buffered or not buffered), melatonin, morphine, naloxone **OR** naloxone **OR** naloxone **OR** naloxone, ondansetron **OR** ondansetron, sodium chloride (PF)

## 2022-11-03 NOTE — DISCHARGE SUMMARY
Discharge Summary  Hospitalist Service      Tony Bonilla MRN# [id number]   YOB: 1990 Age: 32 year old     Date of Admission:  10/31/2022  Date of Discharge:  11/3/2022  Admitting Physician:  Gavin Arango MD  Discharge Physician: Brenda Neumann MD   Discharging Service: Hospitalist Service     Primary Provider: Kathy Caldera  Primary Care Physician Phone Number: [phone]         Discharge Diagnoses/Problem Oriented Hospital Course (Providers):      Discharge Diagnoses   Acute perforated diverticulitis  Sepsis 2/2 Diverticulitis w/ Contained Perforation:     Hospital Course   32M with hx of alcohol abuse presents with abdominal pain and found to have sepsis 2/2 diverticulitis w/ contained perforation. Colorectal surgery and recommended conservative management and patient making interval improvements on IV antibiotics.        Acute perforated diverticulitis  Sepsis 2/2 Diverticulitis w/ Contained Perforation:   -Continue IV Zosyn suspect 12 switch to oral regimen tomorrow if continues to improve  - clinically improving. Will change to oral ciprofloxacin, and flagyl for 10 more days total of 14 days.  - needs follow up colonoscopy 6-8 weeks    Alcohol Abuse w/ Hepatic Steatosis:    alcohol cessation.           Code Status:      Full Code         Important Results:                  Pending Results:        Unresulted Labs Ordered in the Past 30 Days of this Admission     Date and Time Order Name Status Description    10/31/2022  6:26 PM Blood Culture Arm, Right Preliminary     10/31/2022  2:17 PM Blood Culture Peripheral Blood Preliminary                Discharge Instructions and Follow-Up:      Follow-up Appointments     Follow-up and recommended labs and tests       Follow up with primary care provider, Kathy Caldera, within 7   days for hospital follow- up.  No follow up labs or test are needed.                  Discharge Disposition:        Discharged to home           "Discharge Medications:        Current Discharge Medication List      START taking these medications    Details   ciprofloxacin (CIPRO) 500 MG tablet Take 1 tablet (500 mg) by mouth 2 times daily for 10 days  Qty: 20 tablet, Refills: 0    Associated Diagnoses: Sigmoid diverticulitis      metroNIDAZOLE (FLAGYL) 500 MG tablet Take 1 tablet (500 mg) by mouth 2 times daily for 10 days  Qty: 20 tablet, Refills: 0    Associated Diagnoses: Sigmoid diverticulitis         CONTINUE these medications which have NOT CHANGED    Details   acetaminophen (TYLENOL) 325 MG tablet Take 650 mg by mouth every 6 hours as needed for mild pain      mupirocin (BACTROBAN) 2 % external ointment Apply topically 3 times daily  Qty: 30 g, Refills: 11    Associated Diagnoses: Rash      augmented betamethasone dipropionate (DIPROLENE-AF) 0.05 % external ointment Apply topically 2 times daily  Qty: 50 g, Refills: 11    Associated Diagnoses: Rash         STOP taking these medications       ibuprofen (ADVIL/MOTRIN) 200 MG tablet Comments:   Reason for Stopping:                    Allergies:         Allergies   Allergen Reactions     Cefaclor GI Disturbance            Consultations This Hospital Stay:        Consultation during this admission received from surgery          Condition and Physical Exam on Discharge:        Discharge condition: Stable   Discharge vitals: Blood pressure (!) 138/92, pulse 67, temperature 98.1  F (36.7  C), temperature source Oral, resp. rate 18, height 1.778 m (5' 10\"), weight 108.5 kg (239 lb 3.2 oz), SpO2 97 %.   General: Pt in NAD, normal appearance  HEENT: OP clear MMM, no JVD  Lungs: Clear to Auscultation Bilateral, normal breathing  without accessory muscle usage, no wheezing, rhonchi or crackles  Cardiac: +S1, S2, RRR, no MRG, no edema  Abdominal: normal bowel sounds, NT/ND, no hepatosplenomegaly  Skin: warm, dry, normal turgor, no rash  Psyche: A& O x3, appropriate affect            Discharge Orders for Skilled " Facility (from Discharge Orders):        After Care Instructions     Activity      Your activity upon discharge: activity as tolerated         Diet      Follow this diet upon discharge: Orders Placed This Encounter      Low Fiber Diet                    Rehab orders for Skilled Facility (from Discharge Orders):      Referrals     Future Labs/Procedures    Primary Care - Care Coordination Referral     Process Instructions:    Services are provided by a Care Coordinator for people with complex needs   such as: medical, social, or financial troubles.  The Care Coordinator   works with the patient and their Primary Care Provider to determine health   goals, obtain resources, achieve outcomes, and develop care plans that   help coordinate the patient's care.     Comments:                  Discharge Time:      Greater than 30 minutes.        Image Results From This Hospital Stay (For Non-EPIC Providers):        Results for orders placed or performed during the hospital encounter of 10/31/22   CT Abdomen Pelvis w Contrast    Narrative    CT ABDOMEN PELVIS W CONTRAST 10/31/2022 12:48 PM    CLINICAL HISTORY: periumbilical abdominal pain, diarrhea w/possible  melena, dark urine, fever and bilateral lower abdominal pain - concern  for appy vs diverticulitis, ? stone vs liver/pancreas/gallbladder  disease (significant ETOH use); RLQ abdominal pain; Dark urine;  Diarrhea, unspecified type; Dark stools    TECHNIQUE: CT scan of the abdomen and pelvis was performed following  injection of IV contrast. Multiplanar reformats were obtained. Dose  reduction techniques were used.  CONTRAST: 90mL Isovue-370    COMPARISON: None.    FINDINGS:   LOWER CHEST: Unremarkable.    HEPATOBILIARY: Diffuse hepatic steatosis.    PANCREAS: Normal.    SPLEEN: Normal.    ADRENAL GLANDS: Normal.    KIDNEYS/BLADDER: No hydronephrosis.    BOWEL: Colonic diverticulosis with focal inflammation and  extraperitoneal gas in/adjacent to the mid sigmoid colon  (series 2  image 171). Evidence of peritonitis without large volume  pneumoperitoneum, bowel obstruction or drainable fluid collection at  this time. Normal appendix.    PELVIC ORGANS: Normal.    ADDITIONAL FINDINGS: None.    MUSCULOSKELETAL: No acute bony abnormality.      Impression    IMPRESSION:   1.  Acute sigmoid diverticulitis with likely local/contained  perforation. No large volume pneumoperitoneum or drainable fluid  collection at this time. Consider colonoscopy after treatment if not  previously performed.  2.  Diffuse hepatic steatosis.    DAMIAN BECK MD         SYSTEM ID:  [id number]           Most Recent Lab Results In EPIC (For Non-EPIC Providers):    Most Recent 3 CBC's:  Recent Labs   Lab Test 11/03/22  0655 11/02/22  0720 11/01/22  0640   WBC 12.0* 12.7* 18.4*   HGB 14.8 14.8 13.7    100 102*    304 244      Most Recent 3 BMP's:  Recent Labs   Lab Test 11/03/22  0655 11/02/22  0720 10/31/22  1223    139 136   POTASSIUM 4.1 3.8 3.6   CHLORIDE 99 100 93*   CO2 27 27 27   BUN 10.6 9.7 11.9   CR 1.03 0.98 1.12   ANIONGAP 11 12 16*   HERI 9.1 9.1 9.1   GLC 90 93 122*     Most Recent 3 Troponin's:No lab results found.  Most Recent 3 INR's:No lab results found.  Most Recent 2 LFT's:  Recent Labs   Lab Test 10/31/22  1223   AST 32   ALT 93*   ALKPHOS 88   BILITOTAL 6.2*     Most Recent Cholesterol Panel:No lab results found.  Most Recent 6 Bacteria Isolates From Any Culture (See EPIC Reports for Culture Details):No lab results found.  Most Recent TSH, T4 and HgbA1c:No lab results found.

## 2022-11-03 NOTE — PLAN OF CARE
Goal Outcome Evaluation:       Temp: 98.1  F (36.7  C) Temp src: Oral BP: (!) 138/92 Pulse: 67   Resp: 18 SpO2: 97 % O2 Device: None (Room air)       A/O4. UP indp. VSS. Denies pain, SOB. EKG done, SR. All belongings returned to pt. AVS reviewed w pt. 2 medications sent to [facility] pharmacy in [place]. Education hand outs given for cipro and flagyl. Low fiber diet encouraged. All questions answered. IV removed. Pt escorted off floor at around 1609.

## 2022-11-03 NOTE — PROGRESS NOTES
COLON & RECTAL SURGERY  PROGRESS NOTE    November 3, 2022    SUBJECTIVE:  Patient reports his abdominal pain continues to improve.  His bloating is also improved.  +gas.  Loose stool yesterday.  Walking as able.  Tolerating full liquids without nausea.    OBJECTIVE:  Temp:  [97.8  F (36.6  C)-98.3  F (36.8  C)] 98.1  F (36.7  C)  Pulse:  [67-80] 67  Resp:  [14-18] 18  BP: (136-150)/(83-95) 138/92  SpO2:  [96 %-98 %] 97 %    Intake/Output Summary (Last 24 hours) at 11/3/2022 0934  Last data filed at 11/2/2022 1502  Gross per 24 hour   Intake 400 ml   Output --   Net 400 ml       GENERAL:  Awake, alert, no acute distress, lying in bed.  HEAD: Nomocephalic atraumatic  SCLERA: anicteric  EXTREMITIES: warm and well perfused  ABDOMEN:  Soft, focal tenderness in suprapubic area, non-distended, no rebound or guarding, no peritoneal signs.    LABS:  Lab Results   Component Value Date    WBC 12.0 11/03/2022     Lab Results   Component Value Date    HGB 14.8 11/03/2022     Lab Results   Component Value Date    HCT 45.3 11/03/2022     Lab Results   Component Value Date     11/03/2022     Last Basic Metabolic Panel:  Lab Results   Component Value Date     11/03/2022      Lab Results   Component Value Date    POTASSIUM 4.1 11/03/2022     Lab Results   Component Value Date    CHLORIDE 99 11/03/2022     Lab Results   Component Value Date    HERI 9.1 11/03/2022     Lab Results   Component Value Date    CO2 27 11/03/2022     Lab Results   Component Value Date    BUN 10.6 11/03/2022     Lab Results   Component Value Date    CR 1.03 11/03/2022     Lab Results   Component Value Date    GLC 90 11/03/2022       ASSESSMENT/PLAN:  32 year old man admitted with perforated diverticulitis.  Leukocytosis trending down.     - Low fiber diet  - Continue antibiotics  - Pain management as needed, minimize narcotics  - Encourage ambulation  - No plans for surgery at this time  - He will need a follow up colonoscopy in 6-8 weeks      For  questions/paging, please contact the CRS office at [phone].    Sheryl Carter PA-C  [facility]  Phone: [phone]

## 2022-11-03 NOTE — PLAN OF CARE
Goal Outcome Evaluation:      Plan of Care Reviewed With: patient      Pt A&O x 4. VSS. Up independently. BS +. LS clear. On room air. PIV infusing LR at 50 mL/h. On Zosyn. Has rash on back, chest and upper arms.

## 2022-11-03 NOTE — PROGRESS NOTES
Assumed care from 3pm - 7pm. Continued with plan of care. Applied lotion to chest and back. No significant changes.   Susan Glez placed by bedside nurse without issue. Staff reports functioning and patent.

## 2022-11-04 LAB
ATRIAL RATE - MUSE: 72 BPM
DIASTOLIC BLOOD PRESSURE - MUSE: NORMAL MMHG
INTERPRETATION ECG - MUSE: NORMAL
P AXIS - MUSE: 43 DEGREES
PR INTERVAL - MUSE: 150 MS
QRS DURATION - MUSE: 88 MS
QT - MUSE: 422 MS
QTC - MUSE: 462 MS
R AXIS - MUSE: 24 DEGREES
SYSTOLIC BLOOD PRESSURE - MUSE: NORMAL MMHG
T AXIS - MUSE: 17 DEGREES
VENTRICULAR RATE- MUSE: 72 BPM

## 2022-11-04 NOTE — TELEPHONE ENCOUNTER
The pt is aware and scheduled for his upcoming appointment.   Ceci Guaman on 11/4/2022 at 7:38 AM

## 2022-11-05 ENCOUNTER — PATIENT OUTREACH (OUTPATIENT)
Dept: CARE COORDINATION | Facility: CLINIC | Age: 32
End: 2022-11-05

## 2022-11-05 LAB
BACTERIA BLD CULT: NO GROWTH
BACTERIA BLD CULT: NO GROWTH

## 2022-11-05 NOTE — PROGRESS NOTES
[facility] Care Resource Center Contact  [facility]/Voicemail     Clinical Data: Transitional Care Management Outreach     Outreach attempted x 2.  Left message on patient's voicemail, providing [facility]'s 24/7 scheduling and nurse triage phone number [phone] ([phone]) for questions/concerns and/or to schedule an appt with an [facility] provider, if they do not have a PCP.      Plan:  [facility] will do no further outreaches at this time.       Snehal Yepez RN  Connected Care Resource [place], [facility]    *Connected Care Resource Team does NOT follow patient ongoing. Referrals are identified based on internal discharge reports and the outreach is to ensure patient has an understanding of their discharge instructions.

## 2022-11-10 ENCOUNTER — OFFICE VISIT (OUTPATIENT)
Dept: PEDIATRICS | Facility: CLINIC | Age: 32
End: 2022-11-10
Payer: COMMERCIAL

## 2022-11-10 VITALS
HEART RATE: 88 BPM | OXYGEN SATURATION: 97 % | BODY MASS INDEX: 33.84 KG/M2 | DIASTOLIC BLOOD PRESSURE: 82 MMHG | SYSTOLIC BLOOD PRESSURE: 114 MMHG | HEIGHT: 70 IN | RESPIRATION RATE: 16 BRPM | TEMPERATURE: 98.6 F | WEIGHT: 236.4 LBS

## 2022-11-10 DIAGNOSIS — F10.20 ALCOHOLISM (H): Primary | ICD-10-CM

## 2022-11-10 DIAGNOSIS — K57.20 PERFORATION OF SIGMOID COLON DUE TO DIVERTICULITIS: ICD-10-CM

## 2022-11-10 DIAGNOSIS — M25.572 LEFT ANKLE PAIN, UNSPECIFIED CHRONICITY: ICD-10-CM

## 2022-11-10 PROCEDURE — 99495 TRANSJ CARE MGMT MOD F2F 14D: CPT | Performed by: NURSE PRACTITIONER

## 2022-11-10 ASSESSMENT — PATIENT HEALTH QUESTIONNAIRE - PHQ9
SUM OF ALL RESPONSES TO PHQ QUESTIONS 1-9: 10
SUM OF ALL RESPONSES TO PHQ QUESTIONS 1-9: 10
10. IF YOU CHECKED OFF ANY PROBLEMS, HOW DIFFICULT HAVE THESE PROBLEMS MADE IT FOR YOU TO DO YOUR WORK, TAKE CARE OF THINGS AT HOME, OR GET ALONG WITH OTHER PEOPLE: SOMEWHAT DIFFICULT

## 2022-11-10 ASSESSMENT — PAIN SCALES - GENERAL: PAINLEVEL: MODERATE PAIN (4)

## 2022-11-10 NOTE — PROGRESS NOTES
"  Assessment & Plan     (F10.20) Alcoholism (H)  (primary encounter diagnosis)  Comment: Has been sober since discharge without withdrawal sx or desire to drink. Has good insight into underlying trauma and stressors that have lead him to drink. Has good social support.  Plan: Adult Mental Health  Referral       -He's eager to start therapy  -Declines meds for now as he is overall feeling well from a mood perspective.    (K57.20) Perforation of sigmoid colon due to diverticulitis  Comment: Hospitalized for perforated diverticulitis with sepsis and contained perf. Previously on IV abx but was switched to cipro/flagyl upon discharge. Is having achilles pain so will need to use augmentin for the last 2 days and stop cipro/flagy.  Plan: Adult GI  Referral - Procedure Only,         amoxicillin-clavulanate (AUGMENTIN) 875-125 MG         tablet, CANCELED: Comprehensive metabolic panel        (BMP + Alb, Alk Phos, ALT, AST, Total. Bili,         TP), CANCELED: CBC with platelets and         differential, CANCELED: CRP, inflammation        -Switched to Augmentin   -For any persistent sx or return of sx (lllq pain, fever, sweats, etc) seek care right away  -Follow-up colonoscopy 8 weeks    (M25.572) Left ankle pain, unspecified chronicity  Comment: New problem starting last day in hospital. Had been on cipro at that time.   Plan: Ankle/Foot Bracing Supplies Order for DME -         ONLY FOR DME, Orthopedic  Referral,         amoxicillin-clavulanate (AUGMENTIN) 875-125 MG         tablet          -Start boot to reduce ankle flexion  -Stop cipro  -Contact us if sx not improving. May need imaging.   BMI:   Estimated body mass index is 34.41 kg/m  as calculated from the following:    Height as of this encounter: 1.765 m (5' 9.5\").    Weight as of this encounter: 107.2 kg (236 lb 6.4 oz).   Weight management plan: Discussed healthy diet and exercise guidelines    Depression Screening Follow Up    PHQ " 11/10/2022   PHQ-9 Total Score 10   Q9: Thoughts of better off dead/self-harm past 2 weeks Not at all     Last PHQ-9 11/10/2022   1.  Little interest or pleasure in doing things 2   2.  Feeling down, depressed, or hopeless 1   3.  Trouble falling or staying asleep, or sleeping too much 1   4.  Feeling tired or having little energy 2   5.  Poor appetite or overeating 2   6.  Feeling bad about yourself 1   7.  Trouble concentrating 1   8.  Moving slowly or restless 0   Q9: Thoughts of better off dead/self-harm past 2 weeks 0   PHQ-9 Total Score 10       Follow Up Actions Taken  Crisis resource information provided in After Visit Summary  Patient counseled, no additional follow up at this time.         No follow-ups on file.    MICHEAL Rivers CNP  [facility] MARISELA Jimenez is a 32 year old, presenting for the following health issues:  Hospital F/U      History of Present Illness       Reason for visit:  Fallow up of hospitalization of diverticulitis    He eats 0-1 servings of fruits and vegetables daily.He consumes 2 sweetened beverage(s) daily.He exercises with enough effort to increase his heart rate 9 or less minutes per day.  He exercises with enough effort to increase his heart rate 3 or less days per week.   He is taking medications regularly.    Today's PHQ-9         PHQ-9 Total Score: 10    PHQ-9 Q9 Thoughts of better off dead/self-harm past 2 weeks :   Not at all    How difficult have these problems made it for you to do your work, take care of things at home, or get along with other people: Somewhat difficult         Hospital Follow-up Visit:    Hospital/Nursing Home/ Rehab Facility: [facility]  Date of Admission: 11/1/22  Date of Discharge: 11/3/22  Reason(s) for Admission: perforated colon due to diverticulitis    Was your hospitalization related to COVID-19? No   Problems taking medications regularly:  None  Medication changes since  "discharge: None  Problems adhering to non-medication therapy:  None    Summary of hospitalization:  [facility] discharge summary reviewed  Diagnostic Tests/Treatments reviewed.  Follow up needed: colonoscopy    Other Healthcare Providers Involved in Patient s Care:         None  Update since discharge: improved.       Plan of care communicated with patient         Review of Systems   Constitutional, HEENT, cardiovascular, pulmonary, gi and gu systems are negative, except as otherwise noted.      Objective    /82 (BP Location: Right arm, Patient Position: Chair, Cuff Size: Adult Regular)   Pulse 88   Temp 98.6  F (37  C) (Tympanic)   Resp 16   Ht 1.765 m (5' 9.5\")   Wt 107.2 kg (236 lb 6.4 oz)   SpO2 97%   BMI 34.41 kg/m    Body mass index is 34.41 kg/m .  Physical Exam   GI: Abdomen flat. BS x 4. No TTP. No HSM or masses. No CVAT  CONSTITUTIONAL: Alert, well-nourished, well-groomed, NAD  DERM: No obvious rash  MSK: 5/5 strength flexion/extension L ankle. Mild TTP insertion of L nati.         "

## 2022-11-11 ENCOUNTER — PATIENT OUTREACH (OUTPATIENT)
Dept: PEDIATRICS | Facility: CLINIC | Age: 32
End: 2022-11-11

## 2022-11-11 ENCOUNTER — MYC MEDICAL ADVICE (OUTPATIENT)
Dept: PEDIATRICS | Facility: CLINIC | Age: 32
End: 2022-11-11

## 2022-11-11 NOTE — PROGRESS NOTES
"Called the pt. No answer. LVMTCB, left RN PAL ext mobility number ([phone]).    Awaiting callback.      - Juan \"Mathieu\" Alix (he/him/his), RN - Patient Advocate Liason (PAL)  [facility]    "

## 2022-11-11 NOTE — TELEPHONE ENCOUNTER
"Per PCP:  Pt on fluoroquinolones. Had achilles pain yesterday. There is a risk of tendon rupture with FQ. Please call him and have him STOP both antibiotics and replace it with 2 days of augmentin. Had GI upset in the past with a cephalosporin but not a contraindication to augmentin. Let us know if achilles gets worse.    ----------------------------------------------------------------------    Called the pt. No answer. LVMTMARK, left RN PAL ext mobility ([phone]).    Sent HelloTel message to the pt.    ----------------------------------------------------------------------    Called the pt again. Pt answered.    Reviewed provider messaging. Pt agreed to stop current antibiotics and start Augmentin instead. Pt to monitor symptoms.      - Juan \"Mathieu\" Alix (he/him/his), RN - Patient Advocate Liason (PAL)  [facility]    "

## 2022-11-15 ENCOUNTER — MYC MEDICAL ADVICE (OUTPATIENT)
Dept: PEDIATRICS | Facility: CLINIC | Age: 32
End: 2022-11-15

## 2022-11-15 NOTE — LETTER
November 23, 2022      Tony Bonilla  [address] [address]              To whom it may concern,   RE: Tony Jimenez is currently a patient under my care. Please excuse him from work from 11/22/22 through 11/25/22 for ongoing medical concerns. He will reassessed on 11/25/22. Please call the clinic at the number above with questions or concerns.       Sincerely,      Kathy BURTON, CNP

## 2022-11-16 ENCOUNTER — NURSE TRIAGE (OUTPATIENT)
Dept: PEDIATRICS | Facility: CLINIC | Age: 32
End: 2022-11-16
Payer: COMMERCIAL

## 2022-11-16 ENCOUNTER — HOSPITAL ENCOUNTER (INPATIENT)
Facility: CLINIC | Age: 32
LOS: 6 days | Discharge: HOME | End: 2022-11-22
Attending: EMERGENCY MEDICINE | Admitting: STUDENT IN AN ORGANIZED HEALTH CARE EDUCATION/TRAINING PROGRAM
Payer: COMMERCIAL

## 2022-11-16 ENCOUNTER — HOSPITAL ENCOUNTER (OUTPATIENT)
Dept: CT IMAGING | Facility: CLINIC | Age: 32
Discharge: HOME | End: 2022-11-16
Attending: INTERNAL MEDICINE | Admitting: INTERNAL MEDICINE
Payer: COMMERCIAL

## 2022-11-16 ENCOUNTER — OFFICE VISIT (OUTPATIENT)
Dept: PEDIATRICS | Facility: CLINIC | Age: 32
End: 2022-11-16
Attending: NURSE PRACTITIONER
Payer: COMMERCIAL

## 2022-11-16 VITALS
WEIGHT: 231.4 LBS | DIASTOLIC BLOOD PRESSURE: 87 MMHG | HEART RATE: 109 BPM | BODY MASS INDEX: 33.68 KG/M2 | SYSTOLIC BLOOD PRESSURE: 134 MMHG | OXYGEN SATURATION: 98 % | TEMPERATURE: 101.2 F

## 2022-11-16 DIAGNOSIS — K57.20 PERFORATION OF SIGMOID COLON DUE TO DIVERTICULITIS: Primary | ICD-10-CM

## 2022-11-16 DIAGNOSIS — R10.32 LLQ ABDOMINAL PAIN: ICD-10-CM

## 2022-11-16 DIAGNOSIS — R11.0 NAUSEA: ICD-10-CM

## 2022-11-16 DIAGNOSIS — R18.8 ABDOMINAL FLUID COLLECTION: ICD-10-CM

## 2022-11-16 DIAGNOSIS — K57.20 COLONIC DIVERTICULAR ABSCESS: ICD-10-CM

## 2022-11-16 DIAGNOSIS — E86.0 DEHYDRATION: ICD-10-CM

## 2022-11-16 DIAGNOSIS — K57.32 SIGMOID DIVERTICULITIS: ICD-10-CM

## 2022-11-16 LAB
ALBUMIN SERPL BCG-MCNC: 3.8 G/DL (ref 3.5–5.2)
ALBUMIN UR-MCNC: 100 MG/DL
ALP SERPL-CCNC: 94 U/L (ref 40–129)
ALT SERPL W P-5'-P-CCNC: 46 U/L (ref 10–50)
ANION GAP SERPL CALCULATED.3IONS-SCNC: 13 MMOL/L (ref 7–15)
APPEARANCE UR: CLEAR
AST SERPL W P-5'-P-CCNC: 28 U/L (ref 10–50)
BASOPHILS # BLD AUTO: 0.1 10E3/UL (ref 0–0.2)
BASOPHILS NFR BLD AUTO: 0 %
BILIRUB DIRECT SERPL-MCNC: 0.75 MG/DL (ref 0–0.3)
BILIRUB SERPL-MCNC: 2.3 MG/DL
BILIRUB UR QL STRIP: ABNORMAL
BUN SERPL-MCNC: 10.2 MG/DL (ref 6–20)
CALCIUM SERPL-MCNC: 9.2 MG/DL (ref 8.6–10)
CHLORIDE SERPL-SCNC: 100 MMOL/L (ref 98–107)
COLOR UR AUTO: ABNORMAL
CREAT SERPL-MCNC: 1.17 MG/DL (ref 0.67–1.17)
DEPRECATED HCO3 PLAS-SCNC: 25 MMOL/L (ref 22–29)
EOSINOPHIL # BLD AUTO: 0.2 10E3/UL (ref 0–0.7)
EOSINOPHIL NFR BLD AUTO: 1 %
ERYTHROCYTE [DISTWIDTH] IN BLOOD BY AUTOMATED COUNT: 12.3 % (ref 10–15)
GFR SERPL CREATININE-BSD FRML MDRD: 85 ML/MIN/1.73M2
GLUCOSE SERPL-MCNC: 96 MG/DL (ref 70–99)
GLUCOSE UR STRIP-MCNC: NEGATIVE MG/DL
HCT VFR BLD AUTO: 46.7 % (ref 40–53)
HGB BLD-MCNC: 15.2 G/DL (ref 13.3–17.7)
HGB UR QL STRIP: ABNORMAL
IMM GRANULOCYTES # BLD: 0.2 10E3/UL
IMM GRANULOCYTES NFR BLD: 1 %
KETONES UR STRIP-MCNC: NEGATIVE MG/DL
LACTATE SERPL-SCNC: 0.8 MMOL/L (ref 0.7–2)
LEUKOCYTE ESTERASE UR QL STRIP: NEGATIVE
LYMPHOCYTES # BLD AUTO: 1.6 10E3/UL (ref 0.8–5.3)
LYMPHOCYTES NFR BLD AUTO: 6 %
MAGNESIUM SERPL-MCNC: 2.1 MG/DL (ref 1.7–2.3)
MCH RBC QN AUTO: 32.2 PG (ref 26.5–33)
MCHC RBC AUTO-ENTMCNC: 32.5 G/DL (ref 31.5–36.5)
MCV RBC AUTO: 99 FL (ref 78–100)
MONOCYTES # BLD AUTO: 2.3 10E3/UL (ref 0–1.3)
MONOCYTES NFR BLD AUTO: 9 %
MUCOUS THREADS #/AREA URNS LPF: PRESENT /LPF
NEUTROPHILS # BLD AUTO: 21.3 10E3/UL (ref 1.6–8.3)
NEUTROPHILS NFR BLD AUTO: 83 %
NITRATE UR QL: NEGATIVE
NRBC # BLD AUTO: 0 10E3/UL
NRBC BLD AUTO-RTO: 0 /100
PH UR STRIP: 6 [PH] (ref 5–7)
PLATELET # BLD AUTO: 513 10E3/UL (ref 150–450)
POTASSIUM SERPL-SCNC: 3.9 MMOL/L (ref 3.4–5.3)
PROT SERPL-MCNC: 7 G/DL (ref 6.4–8.3)
RBC # BLD AUTO: 4.72 10E6/UL (ref 4.4–5.9)
RBC URINE: 2 /HPF
SARS-COV-2 RNA RESP QL NAA+PROBE: NEGATIVE
SODIUM SERPL-SCNC: 138 MMOL/L (ref 136–145)
SP GR UR STRIP: 1.03 (ref 1–1.03)
UROBILINOGEN UR STRIP-MCNC: NORMAL MG/DL
WBC # BLD AUTO: 25.6 10E3/UL (ref 4–11)
WBC URINE: 2 /HPF

## 2022-11-16 PROCEDURE — 258N000003 HC RX IP 258 OP 636: Performed by: INTERNAL MEDICINE

## 2022-11-16 PROCEDURE — 250N000011 HC RX IP 250 OP 636: Performed by: EMERGENCY MEDICINE

## 2022-11-16 PROCEDURE — 99207 REFERRAL TO ACUTE AND DIAGNOSTIC SERVICES: CPT | Performed by: NURSE PRACTITIONER

## 2022-11-16 PROCEDURE — 36415 COLL VENOUS BLD VENIPUNCTURE: CPT | Performed by: INTERNAL MEDICINE

## 2022-11-16 PROCEDURE — 83735 ASSAY OF MAGNESIUM: CPT | Performed by: STUDENT IN AN ORGANIZED HEALTH CARE EDUCATION/TRAINING PROGRAM

## 2022-11-16 PROCEDURE — 99207 PR INPT ADMISSION FROM CLINIC: CPT | Performed by: INTERNAL MEDICINE

## 2022-11-16 PROCEDURE — 99285 EMERGENCY DEPT VISIT HI MDM: CPT | Mod: 25

## 2022-11-16 PROCEDURE — 74177 CT ABD & PELVIS W/CONTRAST: CPT

## 2022-11-16 PROCEDURE — 120N000001 HC R&B MED SURG/OB

## 2022-11-16 PROCEDURE — 87040 BLOOD CULTURE FOR BACTERIA: CPT | Performed by: EMERGENCY MEDICINE

## 2022-11-16 PROCEDURE — 250N000011 HC RX IP 250 OP 636: Performed by: INTERNAL MEDICINE

## 2022-11-16 PROCEDURE — 81001 URINALYSIS AUTO W/SCOPE: CPT | Performed by: INTERNAL MEDICINE

## 2022-11-16 PROCEDURE — 96361 HYDRATE IV INFUSION ADD-ON: CPT | Performed by: INTERNAL MEDICINE

## 2022-11-16 PROCEDURE — 87493 C DIFF AMPLIFIED PROBE: CPT | Performed by: PHYSICIAN ASSISTANT

## 2022-11-16 PROCEDURE — C9803 HOPD COVID-19 SPEC COLLECT: HCPCS

## 2022-11-16 PROCEDURE — 87506 IADNA-DNA/RNA PROBE TQ 6-11: CPT | Performed by: PHYSICIAN ASSISTANT

## 2022-11-16 PROCEDURE — 99223 1ST HOSP IP/OBS HIGH 75: CPT | Mod: AI | Performed by: PHYSICIAN ASSISTANT

## 2022-11-16 PROCEDURE — 96374 THER/PROPH/DIAG INJ IV PUSH: CPT | Performed by: INTERNAL MEDICINE

## 2022-11-16 PROCEDURE — 36415 COLL VENOUS BLD VENIPUNCTURE: CPT | Performed by: EMERGENCY MEDICINE

## 2022-11-16 PROCEDURE — 82248 BILIRUBIN DIRECT: CPT | Performed by: PHYSICIAN ASSISTANT

## 2022-11-16 PROCEDURE — U0003 INFECTIOUS AGENT DETECTION BY NUCLEIC ACID (DNA OR RNA); SEVERE ACUTE RESPIRATORY SYNDROME CORONAVIRUS 2 (SARS-COV-2) (CORONAVIRUS DISEASE [COVID-19]), AMPLIFIED PROBE TECHNIQUE, MAKING USE OF HIGH THROUGHPUT TECHNOLOGIES AS DESCRIBED BY CMS-2020-01-R: HCPCS | Performed by: EMERGENCY MEDICINE

## 2022-11-16 PROCEDURE — 80053 COMPREHEN METABOLIC PANEL: CPT | Performed by: EMERGENCY MEDICINE

## 2022-11-16 PROCEDURE — 83605 ASSAY OF LACTIC ACID: CPT | Performed by: PHYSICIAN ASSISTANT

## 2022-11-16 PROCEDURE — 96372 THER/PROPH/DIAG INJ SC/IM: CPT | Mod: 59 | Performed by: INTERNAL MEDICINE

## 2022-11-16 PROCEDURE — 258N000003 HC RX IP 258 OP 636: Performed by: PHYSICIAN ASSISTANT

## 2022-11-16 PROCEDURE — 85025 COMPLETE CBC W/AUTO DIFF WBC: CPT | Performed by: INTERNAL MEDICINE

## 2022-11-16 PROCEDURE — 258N000003 HC RX IP 258 OP 636: Performed by: EMERGENCY MEDICINE

## 2022-11-16 PROCEDURE — 250N000011 HC RX IP 250 OP 636: Performed by: PHYSICIAN ASSISTANT

## 2022-11-16 PROCEDURE — 36415 COLL VENOUS BLD VENIPUNCTURE: CPT | Performed by: PHYSICIAN ASSISTANT

## 2022-11-16 PROCEDURE — 250N000013 HC RX MED GY IP 250 OP 250 PS 637: Performed by: PHYSICIAN ASSISTANT

## 2022-11-16 RX ORDER — LIDOCAINE 40 MG/G
CREAM TOPICAL
Status: DISCONTINUED | OUTPATIENT
Start: 2022-11-16 | End: 2022-11-22 | Stop reason: HOSPADM

## 2022-11-16 RX ORDER — HYDROMORPHONE HYDROCHLORIDE 1 MG/ML
0.5 INJECTION, SOLUTION INTRAMUSCULAR; INTRAVENOUS; SUBCUTANEOUS
Status: DISCONTINUED | OUTPATIENT
Start: 2022-11-16 | End: 2022-11-17

## 2022-11-16 RX ORDER — PROCHLORPERAZINE 25 MG
25 SUPPOSITORY, RECTAL RECTAL EVERY 12 HOURS PRN
Status: DISCONTINUED | OUTPATIENT
Start: 2022-11-16 | End: 2022-11-22 | Stop reason: HOSPADM

## 2022-11-16 RX ORDER — PROCHLORPERAZINE MALEATE 5 MG
10 TABLET ORAL EVERY 6 HOURS PRN
Status: DISCONTINUED | OUTPATIENT
Start: 2022-11-16 | End: 2022-11-22 | Stop reason: HOSPADM

## 2022-11-16 RX ORDER — ONDANSETRON 2 MG/ML
4 INJECTION INTRAMUSCULAR; INTRAVENOUS EVERY 6 HOURS PRN
Status: DISCONTINUED | OUTPATIENT
Start: 2022-11-16 | End: 2022-11-22 | Stop reason: HOSPADM

## 2022-11-16 RX ORDER — ONDANSETRON 2 MG/ML
4 INJECTION INTRAMUSCULAR; INTRAVENOUS
Status: DISCONTINUED | OUTPATIENT
Start: 2022-11-16 | End: 2022-11-16

## 2022-11-16 RX ORDER — ONDANSETRON 4 MG/1
4 TABLET, ORALLY DISINTEGRATING ORAL EVERY 6 HOURS PRN
Status: DISCONTINUED | OUTPATIENT
Start: 2022-11-16 | End: 2022-11-22 | Stop reason: HOSPADM

## 2022-11-16 RX ORDER — IOPAMIDOL 755 MG/ML
500 INJECTION, SOLUTION INTRAVASCULAR ONCE
Status: COMPLETED | OUTPATIENT
Start: 2022-11-16 | End: 2022-11-16

## 2022-11-16 RX ORDER — ACETAMINOPHEN 325 MG/1
650 TABLET ORAL EVERY 6 HOURS PRN
Status: DISCONTINUED | OUTPATIENT
Start: 2022-11-16 | End: 2022-11-22 | Stop reason: HOSPADM

## 2022-11-16 RX ORDER — ACETAMINOPHEN 500 MG
1000 TABLET ORAL 2 TIMES DAILY PRN
COMMUNITY

## 2022-11-16 RX ORDER — HYDROMORPHONE HYDROCHLORIDE 1 MG/ML
.3-.5 INJECTION, SOLUTION INTRAMUSCULAR; INTRAVENOUS; SUBCUTANEOUS
Status: DISCONTINUED | OUTPATIENT
Start: 2022-11-16 | End: 2022-11-18

## 2022-11-16 RX ORDER — SODIUM CHLORIDE 9 MG/ML
INJECTION, SOLUTION INTRAVENOUS CONTINUOUS
Status: DISCONTINUED | OUTPATIENT
Start: 2022-11-16 | End: 2022-11-17

## 2022-11-16 RX ORDER — KETOROLAC TROMETHAMINE 30 MG/ML
30 INJECTION, SOLUTION INTRAMUSCULAR; INTRAVENOUS EVERY 6 HOURS PRN
Status: DISCONTINUED | OUTPATIENT
Start: 2022-11-16 | End: 2022-11-16

## 2022-11-16 RX ORDER — OXYCODONE HYDROCHLORIDE 5 MG/1
5 TABLET ORAL EVERY 4 HOURS PRN
Status: DISCONTINUED | OUTPATIENT
Start: 2022-11-16 | End: 2022-11-18

## 2022-11-16 RX ADMIN — TAZOBACTAM SODIUM AND PIPERACILLIN SODIUM 4.5 G: 500; 4 INJECTION, SOLUTION INTRAVENOUS at 22:50

## 2022-11-16 RX ADMIN — HYDROMORPHONE HYDROCHLORIDE 0.5 MG: 1 INJECTION, SOLUTION INTRAMUSCULAR; INTRAVENOUS; SUBCUTANEOUS at 20:34

## 2022-11-16 RX ADMIN — TAZOBACTAM SODIUM AND PIPERACILLIN SODIUM 4.5 G: 500; 4 INJECTION, SOLUTION INTRAVENOUS at 17:26

## 2022-11-16 RX ADMIN — ONDANSETRON 4 MG: 2 INJECTION INTRAMUSCULAR; INTRAVENOUS at 12:06

## 2022-11-16 RX ADMIN — SODIUM CHLORIDE: 9 INJECTION, SOLUTION INTRAVENOUS at 20:16

## 2022-11-16 RX ADMIN — KETOROLAC TROMETHAMINE 30 MG: 30 INJECTION, SOLUTION INTRAMUSCULAR; INTRAVENOUS at 14:12

## 2022-11-16 RX ADMIN — Medication 1000 ML: at 12:59

## 2022-11-16 RX ADMIN — SODIUM CHLORIDE 1000 ML: 9 INJECTION, SOLUTION INTRAVENOUS at 16:35

## 2022-11-16 RX ADMIN — FAMOTIDINE 20 MG: 10 INJECTION INTRAVENOUS at 17:26

## 2022-11-16 RX ADMIN — SODIUM CHLORIDE 100 ML: 9 INJECTION, SOLUTION INTRAVENOUS at 12:43

## 2022-11-16 RX ADMIN — ACETAMINOPHEN 650 MG: 325 TABLET, FILM COATED ORAL at 21:11

## 2022-11-16 RX ADMIN — HYDROMORPHONE HYDROCHLORIDE 0.5 MG: 1 INJECTION, SOLUTION INTRAMUSCULAR; INTRAVENOUS; SUBCUTANEOUS at 17:26

## 2022-11-16 RX ADMIN — IOPAMIDOL 100 ML: 755 INJECTION, SOLUTION INTRAVENOUS at 12:42

## 2022-11-16 ASSESSMENT — ENCOUNTER SYMPTOMS
DIAPHORESIS: 1
CHILLS: 1
ABDOMINAL PAIN: 1
DIARRHEA: 1
FEVER: 1
HEMATURIA: 1

## 2022-11-16 ASSESSMENT — ACTIVITIES OF DAILY LIVING (ADL)
ADLS_ACUITY_SCORE: 35

## 2022-11-16 NOTE — H&P
[facility]  Internal Medicine  History and Physical      Patient Name: Tony Bonilla MRN# [id number]   Age: 32 year old YOB: 1990     Date of Admission:11/16/2022    Primary care provider: Kathy Caldera  Date of Service: 11/16/2022         Assessment and Plan:   Tony Bonilla is a 32 year old male who was recently hospitalized 10/31/2022 - 11/3/2022 for Sepsis due to Acute Perforated Diverticulitis who presents to the ED with abdominal pain.    Sepsis 2/2 Acute Diverticulitis with possible Abscess - pt presents with three days of LLQ abdominal pain, one day of  emesis and two days of non bloody diarrhea.  Pt. recently completed a course of antibiotics on 11/13 for Diverticulitis.  Febrile to 101.2, wbc 25.6 and CT abd/pelvis reveals ongoing sigmoid diverticulitis an concern for a developing abscess.  - check CMP, Lactic Acid, Blood cultures, stool studies  - npo, IVF hydration, analgesics, antiemetics prn     - IV Zosyn  - Colorectal Surgery consult    CODE: full  Diet/IVF: npo, NS  GI ppx:  pepcid  DVT ppx: SCD    Radha Abrams MS PA-C  Physician Assistant   Hospitalist Service  Pager: [phone]           Chief Complaint:   Abdominal Pain         HPI:   32 year old male who was recently hospitalized 10/31/2022 - 11/3/2022 for Sepsis due to Acute Perforated Diverticulitis who presents to the ED with abdominal pain.    Patient was hospitalized 10/31-11/3 with Sepsis due to Acute Perforated Diverticulitis.  Patient was treated with IV Zosyn and discharged on Ciprofloxacin and Flagyl.  Colorectal Surgery was consulted, no surgical intervention was needed and patient was instructed to follow up with colonoscopy in 6-8 weeks.        Patient developed achilles pain on 11/10 and Ciprofloxacin and Flagyl were discontinued and he received two days of Augmentin which he completed on 11/13.  Patient started developing LLQ pain the following day which has progressively increased in  severity with associated diarrhea starting on 11/15 and several episode of emesis today.     Pt was seen in clinic today and labs revealed wbc 25.6.  Temp 101.2.  CT abd/pelvis revealed the previously described changes of acute sigmoid diverticulitis are again noted. In the region of the previously noted contained perforation adjacent to the sigmoid colon, there is a 6.2 cm area of associated phlegmonous inflammatory change. A developing abscess in this location cannot be excluded.  Provider in clinic discussed the case with Dr. White with [facility] and patient was sent to the ED.         Past Medical History:   Alcohol Abuse  Hepatic Steatosis  Obesity, BMI 33  Diverticulitis       Past Surgical History:   None       Social History:     Social History     Socioeconomic History     Marital status: Single     Spouse name: Not on file     Number of children: Not on file     Years of education: Not on file     Highest education level: Not on file   Occupational History     Occupation: Technician     Comment: [employer]   Tobacco Use     Smoking status: Every Day     Types: Cigars     Start date: 1/1/2015     Smokeless tobacco: Never   Vaping Use     Vaping Use: Every day     Start date: 1/1/2017     Substances: Nicotine, Flavoring     Devices: Pre-filled or refillable cartridge   Substance and Sexual Activity     Alcohol use: Not Currently     Comment: 2-3 drinks a night - since 2016     Drug use: Not Currently     Sexual activity: Yes     Partners: Female   Other Topics Concern     Not on file   Social History Narrative     Not on file     Social Determinants of Health     Financial Resource Strain: Not on file   Food Insecurity: Not on file   Transportation Needs: Not on file   Physical Activity: Not on file   Stress: Not on file   Social Connections: Not on file   Intimate Partner Violence: Not on file   Housing Stability: Not on file          Family History:     Family History   Problem Relation Age of Onset     Liver Disease  Mother 60        due to ETOH     Kidney Disease Mother      No Known Problems Father      Other - See Comments Half-Sister         falling accident          Allergies:      Allergies   Allergen Reactions     Cefaclor GI Disturbance     Ciprofloxacin Muscle Pain (Myalgia)     Achilles pain          Medications:     Prior to Admission medications    Medication Sig Last Dose Taking? Auth Provider Long Term End Date   acetaminophen (TYLENOL) 325 MG tablet Take 650 mg by mouth every 6 hours as needed for mild pain   Unknown, Entered By History     augmented betamethasone dipropionate (DIPROLENE-AF) 0.05 % external ointment Apply topically 2 times daily   Kathy Caldera APRN CNP     mupirocin (BACTROBAN) 2 % external ointment Apply topically 3 times daily   Kathy Caldera APRN CNP            Review of Systems:   A complete ROS was performed and is negative other than what is stated in the HPI.       Physical Exam:   Blood pressure (!) 140/87, pulse 98, temperature 98.4  F (36.9  C), temperature source Temporal, resp. rate 16, SpO2 99 %.  General: Alert, interactive, NAD  HEENT: AT/NC  Chest/Resp: clear to auscultation bilaterally, no crackles or wheezes  Heart/CV: regular rate and rhythm, no murmur  Abdomen/GI: Soft, LLQ tenderness to deep palpation, mild distension. +BS.  Extremities/MSK: No LE edema  Skin: Warm and dry  Neuro: Alert & oriented x 3, no focal deficits, moves all extremities equally       Labs:   ROUTINE ICU LABS (Last four results)  CMPNo lab results found in last 7 days.  CBC  Recent Labs   Lab 11/16/22  1207   WBC 25.6*   RBC 4.72   HGB 15.2   HCT 46.7   MCV 99   MCH 32.2   MCHC 32.5   RDW 12.3   *          Imaging/Procedures:     Results for orders placed or performed during the hospital encounter of 11/16/22   CT Abdomen Pelvis w Contrast    Narrative    CT ABDOMEN/PELVIS WITH CONTRAST November 16, 2022 12:47 PM    CLINICAL HISTORY: Left lower quadrant pain.    TECHNIQUE: CT  scan of the abdomen and pelvis was performed following  injection of IV contrast. Multiplanar reformats were obtained. Dose  reduction techniques were used.  CONTRAST: 100mL Isovue 370.    COMPARISON: CT of the abdomen and pelvis performed 10/31/2022.    FINDINGS:   LOWER CHEST: The visualized lung bases are clear.    HEPATOBILIARY: Hepatic steatosis. No hepatic masses are seen.    PANCREAS: Normal.    SPLEEN: Normal.    ADRENAL GLANDS: Normal.    KIDNEYS/BLADDER: Unremarkable. No hydronephrosis.    BOWEL: Scattered colonic diverticulosis. Prominent bowel wall  thickening with surrounding inflammatory stranding in the sigmoid  colon, similar to the previous exam, and consistent with acute  diverticulitis. An area of ill-defined hypodensity adjacent to the  thickened sigmoid colon in the region of the previously described  contained perforation measures 6.2 x 4.7 cm (2/168), suspicious for  phlegmonous inflammation. No associated rim enhancement is identified.  There are a few small adjacent extraluminal air bubbles (2/149). No  bowel obstruction. Unremarkable appendix.    PELVIC ORGANS: Unremarkable.    LYMPH NODES: No enlarged lymph nodes are identified in the abdomen or  pelvis.    VASCULATURE: Unremarkable.    ADDITIONAL FINDINGS: None.    MUSCULOSKELETAL: Unremarkable.      Impression    IMPRESSION:   1.  Previously described changes of acute sigmoid diverticulitis are  again noted.  2.  In the region of the previously noted contained perforation  adjacent to the sigmoid colon, there is a 6.2 cm area of associated  phlegmonous inflammatory change. A developing abscess in this location  cannot be excluded. Clinical correlation and close follow-up are  recommended.  3.  Hepatic steatosis.    CALEB HALE MD         SYSTEM ID:  [id number]

## 2022-11-16 NOTE — ED TRIAGE NOTES
brought from imaging center. Diagnosed with diverticulitis 2 weeks ago. Treated with IV antibiotics and hospitalized. Today, repeat abdominal CT. Shows perforated diverticulitis. Sent to ER for admission to hospital and possible surgical intervention. VSS in triage. 1L NS, zofran at 1145, toradol at 1400.

## 2022-11-16 NOTE — TELEPHONE ENCOUNTER
See nurse triage encounter 11/16/22, symptoms were addressed and patient was advised by covering provider in clinic to be seen at [facility]. Patient was seen at [facility] 11/16/22.     Serafin PUCKETT RN 11/16/2022 at 1:54 PM

## 2022-11-16 NOTE — ED PROVIDER NOTES
History   Chief Complaint:  Abdominal Pain and CT Results       The history is provided by the patient.      Tony Bonilla is a 32 year old male with history of sigmoid diverticulitis with perforation who presents with worsening lower abdominal pain beginning two days ago. Patient was recently admitted to [facility] from 10/31-11/3 for sepsis secondary to sigmoid diverticulitis and received IV antibiotics. He did not have surgery at this time. He was discharged home on a course of Cipro and says he felt much better upon discharge from the hospital and felt well until his current pain began. Patient finished this course of antibiotics three days ago. He also endorses diarrhea, dark colored urine, diaphoresis, chills, and a fever of 101 F.  He was seen in clinic today for these symptoms and had imaging and laboratory work performed (see results below). He also was given 30 mg Toradol, 1L NS, and 4 mg Zofran while in clinic. Patient was sent to the ED for further workup, hospital admission, and surgical intervention by colorectal surgery. No previous history of abdominal surgeries.     CT Abdomen/Pelvis w Contrast Results (11/16/2022):  1.  Previously described changes of acute sigmoid diverticulitis are  again noted.  2.  In the region of the previously noted contained perforation  adjacent to the sigmoid colon, there is a 6.2 cm area of associated  phlegmonous inflammatory change. A developing abscess in this location  cannot be excluded. Clinical correlation and close follow-up are  recommended.  3.  Hepatic steatosis.    Laboratory Results (11/16/2022):  CBC: WBC (25.6), Platelet (513), Absolute Neutrophils (21.3), Absolute Monocytes (2.3)  UA: Color (orange), Bilirubin (small), Blood urine (trace), Protein Albumin (100), Mucus (present)    Review of Systems   Constitutional: Positive for chills, diaphoresis and fever.   Gastrointestinal: Positive for abdominal pain and diarrhea.   Genitourinary: Positive for  hematuria.   All other systems reviewed and are negative.    Allergies:  Cefaclor    Medications:  The patient is not currently taking any prescribed medications.    Past Medical History:     Alcohol use disorder, severe  Hepatic steatosis   Sigmoid diverticulitis with perforation  Tobacco abuse  Colonic diverticular abscess     Family History:    Mother: Alcoholic liver disease, kidney disease    Social History:  The patient presents to the ED with his girlfriend  PCP: Kathy Caldera, MICHEAL MAE, Family Medicine     Physical Exam     Patient Vitals for the past 24 hrs:   BP Temp Temp src Pulse Resp SpO2   11/16/22 1540 (!) 140/87 98.4  F (36.9  C) Temporal 98 16 99 %       Physical Exam      Eyes:    Conjunctiva normal  Neck:    Supple, no meningismus.     CV:     Regular rate and rhythm.      No murmurs, rubs or gallops.  PULM:    Clear to auscultation bilateral.       No respiratory distress.      Good air exchange.  ABD:    Soft, non-distended.       Moderate diffuse lower abdominal tenderness.     Bowel sounds normal.     No pulsatile masses.       No rebound, guarding or rigidity.     No CVA tenderness.   MSK:     No gross deformity to all four extremities.   LYMPH:   No cervical lymphadenopathy.  NEURO:   Alert.  Good muscular tone, no atrophy.   Skin:    Warm, dry and intact.    Psych:    Mood is good and affect is appropriate.    Emergency Department Course     Laboratory:  Labs Ordered and Resulted from Time of ED Arrival to Time of ED Departure   HEPATIC FUNCTION PANEL - Abnormal       Result Value    Protein Total 7.0      Albumin 3.8      Bilirubin Total 2.3 (*)     Alkaline Phosphatase 94      AST 28      ALT 46      Bilirubin Direct 0.75 (*)    BASIC METABOLIC PANEL - Normal    Sodium 138      Potassium 3.9      Chloride 100      Carbon Dioxide (CO2) 25      Anion Gap 13      Urea Nitrogen 10.2      Creatinine 1.17      Calcium 9.2      Glucose 96      GFR Estimate 85     LACTIC ACID WHOLE BLOOD  - Normal    Lactic Acid 0.8     COVID-19 VIRUS (CORONAVIRUS) BY PCR   MAGNESIUM   BLOOD CULTURE   BLOOD CULTURE   ENTERIC BACTERIA AND VIRUS PANEL BY DAKSHA STOOL   C. DIFFICILE TOXIN B PCR WITH REFLEX TO C. DIFFICILE ANTIGEN AND TOXINS A/B EIA        Emergency Department Course:   Reviewed:  I reviewed nursing notes, vitals, past medical history and Care Everywhere    Assessments:  1605 I obtained history and examined the patient as noted above.     Consults:  1610 I spoke with Radha Abrams PA-C accepting for Dr. Lockwood.    Interventions:  1635 NS  1L  IV    Disposition:  The patient was admitted to the hospital under the care of Dr. Lockwood.     Impression & Plan     Medical Decision Makin-year-old male presents to the ED with complicated sigmoid diverticulitis.  He had been previously treated properly with IV antibiotics as an inpatient for microperforation and completed course of Cipro and Flagyl.  Outpatient CT scan today shows ongoing sigmoid diverticulitis and in the area of microperforation, there is phlegmon versus abscess.  Patient given Zosyn, fluids and analgesics.  Outpatient provider had spoke with Dr. White who plans for IR guided drain and likely delayed surgical intervention.  Patient admitted to a medical bed for ongoing management.    Diagnosis:    ICD-10-CM    1. Sigmoid diverticulitis  K57.32       2. Colonic diverticular abscess  K57.20             Scribe Disclosure:  I, Gretta Ablright, am serving as a scribe at 4:17 PM on 2022 to document services personally performed by Evan Hood MD based on my observations and the provider's statements to me.            Evan Hood MD  [id number]

## 2022-11-16 NOTE — TELEPHONE ENCOUNTER
I'm out of office today and don't have time to coordinate with the HUB. Can the hub take this pt without me ordering it? If not, recommend ED ASAP

## 2022-11-16 NOTE — Clinical Note
Thanks for sending Tony to the [facility].   Unfortunately, his diverticulitis is back, along with a probable abscess.   We admitted him, and he will need this drained. Cristino

## 2022-11-16 NOTE — TELEPHONE ENCOUNTER
"Nurse Triage SBAR    Is this a 2nd Level Triage? YES, LICENSED PRACTITIONER REVIEW IS REQUIRED    S-(situation): Patient calling back for triage on abdominal symptoms. See Myc 11/11/22.     B-(background): Patient recently admitted to hospital 10/31/22 - 11/3/22 for acute perforated diverticulitis and sepsis. Patient notes his symptoms feel \"the same as before he was admitted\".     A-(assessment): Patient experiencing 6/10 abdominal pain in lower left side of abdomen, described as sharp and stabbing. Pain started yesterday afternoon around 1pm, gradually worsening since then, constant. Urinating makes pain feel a little better. Slightly improved with tylenol/advil/heat pack. Some small amount of diarrheal stool this morning. Patient notes he feels sweating and had the chills last night. Patient does not have thermometer to check temperature. Patient notes some pelvic bone pain. No back pain. No vomiting. No nausea.     Protocol Recommended Disposition:   Go To ED/UCC Now (Or To Office With PCP Approval)    R-(recommendations): Huddle w/ provider on site for second level triage.     Routed to provider    Does the patient meet one of the following criteria for [facility] visit consideration? 16+ years old, with an [facility] PCP     TIP  Providers, please consider if this condition is appropriate for management at one of our Acute and Diagnostic Services sites.     If patient is a good candidate, please use dotphrase <dot>triageresponse and select Refer to [facility] to document.        Reason for Disposition    Constant abdominal pain lasting > 2 hours    Additional Information    Negative: SEVERE abdominal pain (e.g., excruciating)    Negative: Vomiting red blood or black (coffee ground) material    Negative: Bloody, black, or tarry bowel movements  (Exception: Chronic-unchanged black-grey bowel movements and is taking iron pills or Pepto-Bismol.)    Negative: Unable to urinate (or only a few drops) and bladder feels very full    " "Negative: Pain in scrotum persists > 1 hour    Negative: Chest pain    Negative: Pain is mainly in upper abdomen (if needed ask: 'is it mainly above the belly button?')    Negative: Passed out (i.e., fainted, collapsed and was not responding)    Negative: Shock suspected (e.g., cold/pale/clammy skin, too weak to stand, low BP, rapid pulse)    Negative: Sounds like a life-threatening emergency to the triager    Answer Assessment - Initial Assessment Questions  1. LOCATION: \"Where does it hurt?\"       Lower left quadrant of abdomen  2. RADIATION: \"Does the pain shoot anywhere else?\" (e.g., chest, back)      Pelvic bone area   3. ONSET: \"When did the pain begin?\" (Minutes, hours or days ago)       Yesterday late afternoon, around 1pm   4. SUDDEN: \"Gradual or sudden onset?\"      Gradual  5. PATTERN \"Does the pain come and go, or is it constant?\"     - If constant: \"Is it getting better, staying the same, or worsening?\"       (Note: Constant means the pain never goes away completely; most serious pain is constant and it progresses)      - If intermittent: \"How long does it last?\" \"Do you have pain now?\"      (Note: Intermittent means the pain goes away completely between bouts)      Constant, worsening.   6. SEVERITY: \"How bad is the pain?\"  (e.g., Scale 1-10; mild, moderate, or severe)     - MILD (1-3): doesn't interfere with normal activities, abdomen soft and not tender to touch      - MODERATE (4-7): interferes with normal activities or awakens from sleep, abdomen tender to touch      - SEVERE (8-10): excruciating pain, doubled over, unable to do any normal activities        6/10 stabbing sharp pain   7. RECURRENT SYMPTOM: \"Have you ever had this type of stomach pain before?\" If Yes, ask: \"When was the last time?\" and \"What happened that time?\"       Feels like the same thing as when I was admitted for perforated colon   8. CAUSE: \"What do you think is causing the stomach pain?\"      Perforated colon   9. " "RELIEVING/AGGRAVATING FACTORS: \"What makes it better or worse?\" (e.g., movement, antacids, bowel movement)      Feels better with urination.   10. OTHER SYMPTOMS: \"Do you have any other symptoms?\" (e.g., back pain, diarrhea, fever, urination pain, vomiting)        No vomiting. No nausea. No back pain. Some diarrheal stool earlier this morning. Sweating, had chills last night. Does not have thermometer to check temp.    Protocols used: ABDOMINAL PAIN - MALE-A-OH    Serafin PUCKETT RN 11/16/2022 at 8:28 AM    "

## 2022-11-16 NOTE — PATIENT INSTRUCTIONS
PLAN:  Patient needs hospitalization, resumption of IV antibiotics, and drainage of fluid collection with culture.   Discussed with Dr. White, who will arrange for patient to go to interventional radiology for drainage procedure.  Longer term, consider sigmoid resection.

## 2022-11-16 NOTE — TELEPHONE ENCOUNTER
Attempted to reach patient to triage abdominal symptoms. LVMTCB. Airphrame message also sent at this time.     Serafin PUCKETT RN 11/16/2022 at 7:58 AM

## 2022-11-16 NOTE — TELEPHONE ENCOUNTER
Called to update patient. Informed we are waiting to speak with [facility] to see if they would be able to see him. Advised if they are not, we would most likely advise ED. RN advised patient not to eat or drink anything in case imaging will be completed. RN advised if any new/worsneing symptoms prior to that time, to go straight to ED. Patient verbalized understanding and will await call back.     Serafin PUCKETT RN 11/16/2022 at 8:36 AM

## 2022-11-16 NOTE — TELEPHONE ENCOUNTER
Called and spoke with patient. Informed patient of referral to [facility]. Patient agreed to be seen at [facility] and will arrive by 10:45am today. Patient agreed to have nothing to eat or drink prior to being evaluated. Patient is aware of location and has transportation.     Referral to Acute and Diagnostic Services    The [facility] has been contacted at [phone] ([place]) to confirm patient acceptance. The transition to Acute & Diagnostic Services Clinic has been discussed with patient, and he agrees with next level of care.  Patient understands that evaluation/treatment at [facility] typically takes significantly longer than in clinic/urgent care (>2 hours).    Special issues:  None    Referral placed: Yes  Patient has transportation arranged and will travel to the [facility] without delay: Yes  Patient aware not to eat or drink. Yes    The following provider has assessed this patient for intervention at [facility], and directed the patient for referral: Brianne Cohen CNP.     Serafin Hoyos RN

## 2022-11-16 NOTE — PROGRESS NOTES
ASSESSMENT:      1.  Simoid diverticulitis with perforation.   Patient has failed outpatient treatment, and now appears to have developed an abscess.  2.  Dehydration, status post IV fluids at [facility]    PLAN:  Patient needs hospitalization, resumption of IV antibiotics, and drainage of fluid collection with culture.   Discussed with Dr. White, who will arrange for patient to go to interventional radiology for drainage procedure.  Longer term, consider sigmoid resection.    1 hour+ spent with patient care today    Hiram Jimenez is a 32 year old presenting for the following health issues:  No chief complaint on file.  He reports that following recent discharge from the hospital for diverticulitis his pain had resolved. Recently he switched from ciprofloxacin due to achilles pain. He reports yesterday afternoon his pain returned at a 3/10 and has since worsened to 6/10 severity. He reports it feels exactly like the pain in his hospital admission. He has not eaten since 2am this morning but has been consuming liquids and says he feels dehydrated. He reports eating some ice cream and possible other foods that may have been contraindicated on discharge due to confusion about what foods were allowed.    HPI       Abdominal/Flank Pain  Onset/Duration: Patient was hospitalized on 10.31 for abdominal pain with perforation of th sigmoid colon. Pain returned on 11.14.22.  Description:   Character: Sharp and Cramping  Location: suprapubic region LLQ  Radiation: None, Pelvic region, especially after urinating, also notices darker urine than usual but does feel that he is dehydrated  Intensity: 6/10  Progression of Symptoms:  worsening and chills and sweating started this morning  Accompanying Signs & Symptoms:  Fever/Chills: YES- chills  Gas/Bloating: YES- gas and bloating  Nausea: YES- a little  Vomiting: YES- on Monday morning but thinks it's related to taking antibiotics too close together  Diarrhea: YES- states stool is  "more soft but a little diarrhea in the past 24 hours  Constipation: YES- hurts but stool is soft  Dysuria or Hematuria: no   History:   Trauma: YES- recent perforation  Previous similar pain: YES- recent perforation  Previous tests done: YES  Previous Abdominal Surgery: no   Precipitating factors:   Does the pain change with:     Food: YES- \"my body is telling me not to eat\"    Bowel Movement: YES- maybe a little better    Urination: YES- stings at first but feels better when the pressure is released   Other factors:  YES- heating pad and Tylenol help  Therapies tried and outcome: Tylenol and heat help    When did you eat last: 0300 tracy crackers, apple juice and freezies    Off alcohol 2 weeks  Modest vaping, cigar use  No other chemicals    Note that patient was seen by colorectal surgery during [facility] hospitalization, Dr. Sheryl Carter/Colette White.    Review of Systems   Appetite fine but not eating much  Developed nausea and threw up in clinic today  No other symptoms         Objective    /87 (BP Location: Right leg, Patient Position: Sitting, Cuff Size: Adult Large)   Pulse 109   Temp (!) 101.2  F (38.4  C)   Wt 105 kg (231 lb 6.4 oz)   SpO2 98%   BMI 33.68 kg/m    There is no height or weight on file to calculate BMI.  Physical Exam   GENERAL: healthy, alert and uncomfortable  NECK: no adenopathy, no asymmetry, masses, or scars and thyroid normal to palpation  RESP: lungs clear to auscultation - no rales, rhonchi or wheezes  CV: regular rate and rhythm, normal S1 S2, no S3 or S4, no murmur, click or rub, no peripheral edema and peripheral pulses strong  ABDOMEN: Mild tenderness LLQ and lesser in suprapubic area and LUQ.  no organomegaly or masses, bowel sounds normal and no peritoneal signs  MS: no gross musculoskeletal defects noted, no edema      Results for orders placed or performed in visit on 11/16/22 (from the past 24 hour(s))   CBC with platelets differential    Narrative    The " following orders were created for panel order CBC with platelets differential.  Procedure                               Abnormality         Status                     ---------                               -----------         ------                     CBC with platelets and d...[[id number]]  Abnormal            Final result                 Please view results for these tests on the individual orders.   CBC with platelets and differential   Result Value Ref Range    WBC Count 25.6 (H) 4.0 - 11.0 10e3/uL    RBC Count 4.72 4.40 - 5.90 10e6/uL    Hemoglobin 15.2 13.3 - 17.7 g/dL    Hematocrit 46.7 40.0 - 53.0 %    MCV 99 78 - 100 fL    MCH 32.2 26.5 - 33.0 pg    MCHC 32.5 31.5 - 36.5 g/dL    RDW 12.3 10.0 - 15.0 %    Platelet Count 513 (H) 150 - 450 10e3/uL    % Neutrophils 83 %    % Lymphocytes 6 %    % Monocytes 9 %    % Eosinophils 1 %    % Basophils 0 %    % Immature Granulocytes 1 %    NRBCs per 100 WBC 0 <1 /100    Absolute Neutrophils 21.3 (H) 1.6 - 8.3 10e3/uL    Absolute Lymphocytes 1.6 0.8 - 5.3 10e3/uL    Absolute Monocytes 2.3 (H) 0.0 - 1.3 10e3/uL    Absolute Eosinophils 0.2 0.0 - 0.7 10e3/uL    Absolute Basophils 0.1 0.0 - 0.2 10e3/uL    Absolute Immature Granulocytes 0.2 <=0.4 10e3/uL    Absolute NRBCs 0.0 10e3/uL   UA with Microscopic reflex to Culture    Specimen: Urine, Midstream   Result Value Ref Range    Color Urine Orange (A) Colorless, Straw, Light Yellow, Yellow    Appearance Urine Clear Clear    Glucose Urine Negative Negative mg/dL    Bilirubin Urine Small (A) Negative    Ketones Urine Negative Negative mg/dL    Specific Gravity Urine 1.026 1.003 - 1.035    Blood Urine Trace (A) Negative    pH Urine 6.0 5.0 - 7.0    Protein Albumin Urine 100 (A) Negative mg/dL    Urobilinogen Urine Normal Normal, 2.0 mg/dL    Nitrite Urine Negative Negative    Leukocyte Esterase Urine Negative Negative    Mucus Urine Present (A) None Seen /LPF    RBC Urine 2 <=2 /HPF    WBC Urine 2 <=5 /HPF    Narrative     Urine Culture not indicated     CT ABDOMEN IMPRESSION:   1.  Previously described changes of acute sigmoid diverticulitis are  again noted.  2.  In the region of the previously noted contained perforation  adjacent to the sigmoid colon, there is a 6.2 cm area of associated  phlegmonous inflammatory change. A developing abscess in this location  cannot be excluded. Clinical correlation and close follow-up are  recommended.  3.  Hepatic steatosis.

## 2022-11-17 LAB
ALBUMIN SERPL BCG-MCNC: 3.3 G/DL (ref 3.5–5.2)
ALP SERPL-CCNC: 96 U/L (ref 40–129)
ALT SERPL W P-5'-P-CCNC: 38 U/L (ref 10–50)
ANION GAP SERPL CALCULATED.3IONS-SCNC: 12 MMOL/L (ref 7–15)
AST SERPL W P-5'-P-CCNC: 21 U/L (ref 10–50)
BILIRUB SERPL-MCNC: 2.8 MG/DL
BUN SERPL-MCNC: 9.4 MG/DL (ref 6–20)
C COLI+JEJUNI+LARI FUSA STL QL NAA+PROBE: NOT DETECTED
C DIFF TOX B STL QL: NEGATIVE
CALCIUM SERPL-MCNC: 8.6 MG/DL (ref 8.6–10)
CHLORIDE SERPL-SCNC: 102 MMOL/L (ref 98–107)
CREAT SERPL-MCNC: 1.01 MG/DL (ref 0.67–1.17)
DEPRECATED HCO3 PLAS-SCNC: 24 MMOL/L (ref 22–29)
EC STX1 GENE STL QL NAA+PROBE: NOT DETECTED
EC STX2 GENE STL QL NAA+PROBE: NOT DETECTED
ERYTHROCYTE [DISTWIDTH] IN BLOOD BY AUTOMATED COUNT: 12.7 % (ref 10–15)
GFR SERPL CREATININE-BSD FRML MDRD: >90 ML/MIN/1.73M2
GLUCOSE SERPL-MCNC: 91 MG/DL (ref 70–99)
HCT VFR BLD AUTO: 37.6 % (ref 40–53)
HGB BLD-MCNC: 12.6 G/DL (ref 13.3–17.7)
LACTATE SERPL-SCNC: 0.6 MMOL/L (ref 0.7–2)
LACTATE SERPL-SCNC: 1 MMOL/L (ref 0.7–2)
MAGNESIUM SERPL-MCNC: 2 MG/DL (ref 1.7–2.3)
MCH RBC QN AUTO: 33.1 PG (ref 26.5–33)
MCHC RBC AUTO-ENTMCNC: 33.5 G/DL (ref 31.5–36.5)
MCV RBC AUTO: 99 FL (ref 78–100)
NOROV GI+II ORF1-ORF2 JNC STL QL NAA+PR: NOT DETECTED
PLATELET # BLD AUTO: 429 10E3/UL (ref 150–450)
POTASSIUM SERPL-SCNC: 4 MMOL/L (ref 3.4–5.3)
PROT SERPL-MCNC: 6.2 G/DL (ref 6.4–8.3)
RBC # BLD AUTO: 3.81 10E6/UL (ref 4.4–5.9)
RVA NSP5 STL QL NAA+PROBE: NOT DETECTED
SALMONELLA SP RPOD STL QL NAA+PROBE: NOT DETECTED
SHIGELLA SP+EIEC IPAH STL QL NAA+PROBE: NOT DETECTED
SODIUM SERPL-SCNC: 138 MMOL/L (ref 136–145)
V CHOL+PARA RFBL+TRKH+TNAA STL QL NAA+PR: NOT DETECTED
WBC # BLD AUTO: 22.6 10E3/UL (ref 4–11)
Y ENTERO RECN STL QL NAA+PROBE: NOT DETECTED

## 2022-11-17 PROCEDURE — 99233 SBSQ HOSP IP/OBS HIGH 50: CPT | Performed by: INTERNAL MEDICINE

## 2022-11-17 PROCEDURE — 250N000011 HC RX IP 250 OP 636: Performed by: PHYSICIAN ASSISTANT

## 2022-11-17 PROCEDURE — 258N000003 HC RX IP 258 OP 636: Performed by: PHYSICIAN ASSISTANT

## 2022-11-17 PROCEDURE — 36415 COLL VENOUS BLD VENIPUNCTURE: CPT | Performed by: COLON & RECTAL SURGERY

## 2022-11-17 PROCEDURE — 83605 ASSAY OF LACTIC ACID: CPT | Performed by: COLON & RECTAL SURGERY

## 2022-11-17 PROCEDURE — 36415 COLL VENOUS BLD VENIPUNCTURE: CPT | Performed by: PHYSICIAN ASSISTANT

## 2022-11-17 PROCEDURE — 120N000001 HC R&B MED SURG/OB

## 2022-11-17 PROCEDURE — 250N000013 HC RX MED GY IP 250 OP 250 PS 637: Performed by: PHYSICIAN ASSISTANT

## 2022-11-17 PROCEDURE — 85027 COMPLETE CBC AUTOMATED: CPT | Performed by: PHYSICIAN ASSISTANT

## 2022-11-17 PROCEDURE — 83605 ASSAY OF LACTIC ACID: CPT | Performed by: PHYSICIAN ASSISTANT

## 2022-11-17 PROCEDURE — 258N000001 HC RX 258: Performed by: INTERNAL MEDICINE

## 2022-11-17 PROCEDURE — 83735 ASSAY OF MAGNESIUM: CPT | Performed by: PHYSICIAN ASSISTANT

## 2022-11-17 PROCEDURE — 82435 ASSAY OF BLOOD CHLORIDE: CPT | Performed by: PHYSICIAN ASSISTANT

## 2022-11-17 RX ORDER — DEXTROSE MONOHYDRATE, SODIUM CHLORIDE, AND POTASSIUM CHLORIDE 50; 1.49; 9 G/1000ML; G/1000ML; G/1000ML
INJECTION, SOLUTION INTRAVENOUS CONTINUOUS
Status: DISCONTINUED | OUTPATIENT
Start: 2022-11-17 | End: 2022-11-20

## 2022-11-17 RX ADMIN — TAZOBACTAM SODIUM AND PIPERACILLIN SODIUM 4.5 G: 500; 4 INJECTION, SOLUTION INTRAVENOUS at 16:51

## 2022-11-17 RX ADMIN — HYDROMORPHONE HYDROCHLORIDE 0.5 MG: 1 INJECTION, SOLUTION INTRAMUSCULAR; INTRAVENOUS; SUBCUTANEOUS at 07:52

## 2022-11-17 RX ADMIN — HYDROMORPHONE HYDROCHLORIDE 0.5 MG: 1 INJECTION, SOLUTION INTRAMUSCULAR; INTRAVENOUS; SUBCUTANEOUS at 03:27

## 2022-11-17 RX ADMIN — POTASSIUM CHLORIDE, DEXTROSE MONOHYDRATE AND SODIUM CHLORIDE: 150; 5; 900 INJECTION, SOLUTION INTRAVENOUS at 11:59

## 2022-11-17 RX ADMIN — TAZOBACTAM SODIUM AND PIPERACILLIN SODIUM 4.5 G: 500; 4 INJECTION, SOLUTION INTRAVENOUS at 22:40

## 2022-11-17 RX ADMIN — ACETAMINOPHEN 650 MG: 325 TABLET, FILM COATED ORAL at 23:43

## 2022-11-17 RX ADMIN — OXYCODONE HYDROCHLORIDE 5 MG: 5 TABLET ORAL at 15:06

## 2022-11-17 RX ADMIN — OXYCODONE HYDROCHLORIDE 5 MG: 5 TABLET ORAL at 21:27

## 2022-11-17 RX ADMIN — HYDROMORPHONE HYDROCHLORIDE 0.5 MG: 1 INJECTION, SOLUTION INTRAMUSCULAR; INTRAVENOUS; SUBCUTANEOUS at 19:46

## 2022-11-17 RX ADMIN — HYDROMORPHONE HYDROCHLORIDE 0.5 MG: 1 INJECTION, SOLUTION INTRAMUSCULAR; INTRAVENOUS; SUBCUTANEOUS at 16:57

## 2022-11-17 RX ADMIN — FAMOTIDINE 20 MG: 10 INJECTION INTRAVENOUS at 16:52

## 2022-11-17 RX ADMIN — ACETAMINOPHEN 650 MG: 325 TABLET, FILM COATED ORAL at 15:06

## 2022-11-17 RX ADMIN — FAMOTIDINE 20 MG: 10 INJECTION INTRAVENOUS at 04:23

## 2022-11-17 RX ADMIN — POTASSIUM CHLORIDE, DEXTROSE MONOHYDRATE AND SODIUM CHLORIDE: 150; 5; 900 INJECTION, SOLUTION INTRAVENOUS at 22:42

## 2022-11-17 RX ADMIN — TAZOBACTAM SODIUM AND PIPERACILLIN SODIUM 4.5 G: 500; 4 INJECTION, SOLUTION INTRAVENOUS at 10:53

## 2022-11-17 RX ADMIN — TAZOBACTAM SODIUM AND PIPERACILLIN SODIUM 4.5 G: 500; 4 INJECTION, SOLUTION INTRAVENOUS at 04:23

## 2022-11-17 RX ADMIN — SODIUM CHLORIDE: 9 INJECTION, SOLUTION INTRAVENOUS at 04:23

## 2022-11-17 RX ADMIN — OXYCODONE HYDROCHLORIDE 5 MG: 5 TABLET ORAL at 10:53

## 2022-11-17 RX ADMIN — HYDROMORPHONE HYDROCHLORIDE 0.5 MG: 1 INJECTION, SOLUTION INTRAMUSCULAR; INTRAVENOUS; SUBCUTANEOUS at 00:52

## 2022-11-17 RX ADMIN — OXYCODONE HYDROCHLORIDE 5 MG: 5 TABLET ORAL at 03:27

## 2022-11-17 ASSESSMENT — ACTIVITIES OF DAILY LIVING (ADL)
ADLS_ACUITY_SCORE: 35
CONCENTRATING,_REMEMBERING_OR_MAKING_DECISIONS_DIFFICULTY: NO
CHANGE_IN_FUNCTIONAL_STATUS_SINCE_ONSET_OF_CURRENT_ILLNESS/INJURY: NO
ADLS_ACUITY_SCORE: 35
DIFFICULTY_COMMUNICATING: NO
ADLS_ACUITY_SCORE: 18
ADLS_ACUITY_SCORE: 20
WEAR_GLASSES_OR_BLIND: NO
ADLS_ACUITY_SCORE: 18
HEARING_DIFFICULTY_OR_DEAF: NO
HEARING_DIFFICULTY_OR_DEAF: NO
DIFFICULTY_EATING/SWALLOWING: NO
ADLS_ACUITY_SCORE: 35
DOING_ERRANDS_INDEPENDENTLY_DIFFICULTY: NO
TOILETING_ISSUES: NO
ADLS_ACUITY_SCORE: 35
WALKING_OR_CLIMBING_STAIRS_DIFFICULTY: NO
DIFFICULTY_EATING/SWALLOWING: NO
DIFFICULTY_COMMUNICATING: NO
ADLS_ACUITY_SCORE: 35
CHANGE_IN_FUNCTIONAL_STATUS_SINCE_ONSET_OF_CURRENT_ILLNESS/INJURY: NO
CONCENTRATING,_REMEMBERING_OR_MAKING_DECISIONS_DIFFICULTY: NO
WALKING_OR_CLIMBING_STAIRS_DIFFICULTY: NO
WEAR_GLASSES_OR_BLIND: NO
ADLS_ACUITY_SCORE: 35
ADLS_ACUITY_SCORE: 35
FALL_HISTORY_WITHIN_LAST_SIX_MONTHS: NO
ADLS_ACUITY_SCORE: 35
DOING_ERRANDS_INDEPENDENTLY_DIFFICULTY: NO
DRESSING/BATHING_DIFFICULTY: NO
DRESSING/BATHING_DIFFICULTY: NO
ADLS_ACUITY_SCORE: 18

## 2022-11-17 NOTE — UTILIZATION REVIEW
Admission Status; Secondary Review Determination       Under the authority of the Utilization Management Committee, the utilization review process indicated a secondary review on the above patient. The review outcome is based on review of the medical records, discussions with staff, and applying clinical experience noted on the date of the review.     (x) Inpatient Status Appropriate - This patient's medical care is consistent with medical management for inpatient care and reasonable inpatient medical practice.     RATIONALE FOR DETERMINATION   32 year old male who was recently hospitalized 10/31/2022 - 11/3/2022 for Sepsis due to Acute Perforated Diverticulitis who presents to the ED with abdominal pain.     pt presents with three days of LLQ abdominal pain, one day of emesis and two days of non bloody diarrhea. Pt. recently completed a course of antibiotics on 11/13 for Diverticulitis. Febrile to 101.2, wbc 25.6 and CT abd/pelvis reveals ongoing sigmoid diverticulitis an concern for a developing abscess.     Patient requires inpatient admission versus short stay observation or outpatient treatment for the following reasons: Patient has failed outpatient management was diverticulitis and contained perforation now presenting with a significant complication and abscess formation, the abscess is not amenable for IR drainage, and might need surgical intervention.  Patient on broad-spectrum antibiotic coverage will require close monitoring n.p.o. status IV pain medication IV fluid colorectal evaluation and repeat imaging in the next 1 to 2 days to decide if he will need surgery.  This patient has failed outpatient management and presenting with significant complication, concern for significant inflammatory response, clear evidence of sepsis with fever and white count more than doubled, 25.6 at the time of admission  The expected length of stay at the time of admission was more than 2 nights because of the severity of  illness, intensity of service provided, and risk for adverse outcome. Inpatient admission is appropriate.         This document was produced using voice recognition software       The information on this document is developed by the utilization review team in order for the business office to ensure compliance. This only denotes the appropriateness of proper admission status and does not reflect the quality of care rendered.   The definitions of Inpatient Status and Observation Status used in making the determination above are those provided in the CMS Coverage Manual, Chapter 1 and Chapter 6, section 70.4.   Sincerely,   ALVIN SOLER MD   System Medical Director   Utilization Management   [facility].

## 2022-11-17 NOTE — PLAN OF CARE
Goal Outcome Evaluation: Progressing    /81 (BP Location: Left arm)   Pulse 95   Temp 98.9  F (37.2  C) (Oral)   Resp 18   SpO2 95%     On RA.    Pertinent Assessments: A/ox4. 7/10 abd pain. LS clear. Abd distended. Diarrhea reported. Up IND.   Major Shift Events: None  Treatment Plan: IVF. IV abx. Pain mgmt. Bowel rest. CR following, medical mgmt at this time. Potential need for sx if no improvement.

## 2022-11-17 NOTE — PROGRESS NOTES
[facility]  Hospitalist Progress Note  John Rodriguez M.D., M.B.A.   11/17/2022    Reason for Stay/active problem list      Acute sigmoid diverticulitis with contained perforation and sepsis         Assessment and Plan:        Summary of Stay: Tony Bonilla is a 32 year old male who was recently hospitalized 10/31/2022 - 11/3/2022 for Sepsis due to Acute Perforated Diverticulitis who presents to the ED with abdominal pain.      Problem List with Assessment and Plan:       Sepsis 2/2 Acute Diverticulitis with possible phlegmon versus abscess - pt presents with three days of LLQ abdominal pain, one day of  emesis and two days of non bloody diarrhea.  Pt. recently completed a course of antibiotics on 11/13 for Diverticulitis.   -- Patient was febrile on admission with temp of 101.2, wbc 25.6 and CT abd/pelvis revealed ongoing sigmoid diverticulitis an concern for a developing abscess and contained perforation.  --Patient was admitted and was treated with bowel rest, IV pain medications, IV fluids, IV antibiotic and colorectal surgery was consulted.  --Currently, patient is stable afebrile.  Stable hemodynamically.  He was seen by colorectal surgery and plans noted for conservative management for now.  Continue antibiotic with Zosyn, monitor vital signs and symptoms and reach out to colorectal surgery team if urgent surgery is needed     Elevated total bilirubin: Serum bilirubin total of 2.8.  Etiology unclear, CT of the abdomen showed hepatic steatosis.  Continue to monitor LFTs      Plan for today:    Continue bowel rest with n.p.o., IV fluid, IV antibiotic, IV medications and closely monitor patient.      VTE Prophylaxis: Pneumatic Compression Devices  Code Status: Full Code  Diet: NPO for Medical/Clinical Reasons Except for: Meds    Zavala Catheter: Not present    Family updated today: No     Disposition: Anticipate discharge home in the next 2 to 3 days if he does not require surgery           Interval History (Subjective):        Patient is seen and examined by me today and medical record reviewed.Overnight events noted and care discussed with nursing staff.  Patient care assumed today.  He feels slightly better.  Still has lower abdominal pain more on the left.  Denies any nausea or vomiting.  No fever this morning.  Patient was seen by Dr. Colette White of colorectal surgery                  Physical Exam:        Last Vital Signs:  /81 (BP Location: Left arm)   Pulse 95   Temp 98.9  F (37.2  C) (Oral)   Resp 18   SpO2 95%     No intake/output data recorded.    Wt Readings from Last 5 Encounters:   11/16/22 105 kg (231 lb 6.4 oz)   11/10/22 107.2 kg (236 lb 6.4 oz)   11/01/22 108.5 kg (239 lb 3.2 oz)   10/31/22 110.2 kg (243 lb)        Constitutional: Awake, alert, cooperative, no apparent distress     Respiratory: Clear to auscultation bilaterally, no crackles or wheezing   Cardiovascular: Regular rate and rhythm, normal S1 and S2, and no murmur noted   Abdomen:  abdomen is soft, tenderness noted in the lower abdomen.  No rebound tenderness guarding or rigidity.  Bowel sounds normoactive   Skin: No new rashes, no cyanosis, dry to touch   Neuro: Alert with  no new focal weakness   Extremities: No edema   Other(s):        All other systems: Negative          Medications:        All current medications were reviewed with changes reflected in problem list.         Data:      All new lab and imaging data was reviewed.      Data reviewed today: I reviewed all new labs and imaging results over the last 24 hours. I personally reviewed       Recent Labs   Lab 11/17/22  0817 11/16/22  1207   WBC 22.6* 25.6*   HGB 12.6* 15.2   HCT 37.6* 46.7   MCV 99 99    513*     No results for input(s): CULT in the last 168 hours.  Recent Labs   Lab 11/17/22  0817 11/17/22  0051 11/16/22  1634     --  138   POTASSIUM 4.0  --  3.9   CHLORIDE 102  --  100   CO2 24  --  25   ANIONGAP 12  --  13   GLC 91  --   96   BUN 9.4  --  10.2   CR 1.01  --  1.17   GFRESTIMATED >90  --  85   HERI 8.6  --  9.2   MAG  --  2.0 2.1   PROTTOTAL 6.2*  --  7.0   ALBUMIN 3.3*  --  3.8   BILITOTAL 2.8*  --  2.3*   ALKPHOS 96  --  94   AST 21  --  28   ALT 38  --  46       Recent Labs   Lab 11/17/22  0817 11/16/22  1634   GLC 91 96       No results for input(s): INR in the last 168 hours.      No results for input(s): TROPONIN, TROPI, TROPR in the last 168 hours.    Invalid input(s): TROP, TROPONINIES    Recent Results (from the past 48 hour(s))   CT Abdomen Pelvis w Contrast    Narrative    CT ABDOMEN/PELVIS WITH CONTRAST November 16, 2022 12:47 PM    CLINICAL HISTORY: Left lower quadrant pain.    TECHNIQUE: CT scan of the abdomen and pelvis was performed following  injection of IV contrast. Multiplanar reformats were obtained. Dose  reduction techniques were used.  CONTRAST: 100mL Isovue 370.    COMPARISON: CT of the abdomen and pelvis performed 10/31/2022.    FINDINGS:   LOWER CHEST: The visualized lung bases are clear.    HEPATOBILIARY: Hepatic steatosis. No hepatic masses are seen.    PANCREAS: Normal.    SPLEEN: Normal.    ADRENAL GLANDS: Normal.    KIDNEYS/BLADDER: Unremarkable. No hydronephrosis.    BOWEL: Scattered colonic diverticulosis. Prominent bowel wall  thickening with surrounding inflammatory stranding in the sigmoid  colon, similar to the previous exam, and consistent with acute  diverticulitis. An area of ill-defined hypodensity adjacent to the  thickened sigmoid colon in the region of the previously described  contained perforation measures 6.2 x 4.7 cm (2/168), suspicious for  phlegmonous inflammation. No associated rim enhancement is identified.  There are a few small adjacent extraluminal air bubbles (2/149). No  bowel obstruction. Unremarkable appendix.    PELVIC ORGANS: Unremarkable.    LYMPH NODES: No enlarged lymph nodes are identified in the abdomen or  pelvis.    VASCULATURE: Unremarkable.    ADDITIONAL FINDINGS:  None.    MUSCULOSKELETAL: Unremarkable.      Impression    IMPRESSION:   1.  Previously described changes of acute sigmoid diverticulitis are  again noted.  2.  In the region of the previously noted contained perforation  adjacent to the sigmoid colon, there is a 6.2 cm area of associated  phlegmonous inflammatory change. A developing abscess in this location  cannot be excluded. Clinical correlation and close follow-up are  recommended.  3.  Hepatic steatosis.    CALEB HALE MD         SYSTEM ID:  [id number]       COVID Status:  COVID-19 PCR Results    COVID-19 PCR Results 10/31/22 11/16/22   SARS CoV2 PCR Negative Negative      Comments are available for some flowsheets but are not being displayed.         COVID-19 Antibody Results, Testing for Immunity    COVID-19 Antibody Results, Testing for Immunity   No data to display.              Disclaimer: This note consists of symbols derived from keyboarding, dictation and/or voice recognition software. As a result, there may be errors in the script that have gone undetected. Please consider this when interpreting information found in this chart.

## 2022-11-17 NOTE — PROGRESS NOTES
Dx sigmoid diverticulitis and colonic diverticulitis abscess. Pt A&O x 4. VSS, except fever of 102.6. PRN Tylenol given. Last temp was 100.2.  Up independently. On room air. O2 Sat 94-97%. LS clear. BS+. PIV infusing NS @ 125 mL/hr. Abx. Zosyn. NPO except for med. C/o L Lower abdominal pain. PRN IV dilaudid given. Stool sample collected to r/o C-Diff.

## 2022-11-17 NOTE — PHARMACY-ADMISSION MEDICATION HISTORY
Admission medication history interview status for this patient is complete. See [facility] admission navigator for allergy information, prior to admission medications and immunization status.     Medication history interview done, indicate source(s): Patient  Medication history resources (including written lists, pill bottles, clinic record):EPIC    Changes made to PTA medication list:  Added: none  Changed: Tylenol prn  Reported as Not Taking: Augmented betamethasone ointment; Mupirocin ointment.  Removed: none    Medication reconciliation/reorder completed by provider prior to medication history?  N  month?   Do you have a Continuous Glucose Monitor (CGM)?      Prior to Admission medications    Medication Sig Last Dose Taking? Auth Provider Long Term End Date   acetaminophen (TYLENOL) 500 MG tablet Take 1,000 mg by mouth 2 times daily as needed for mild pain  Yes Unknown, Entered By History     augmented betamethasone dipropionate (DIPROLENE-AF) 0.05 % external ointment Apply topically 2 times daily  Patient not taking: Reported on 11/16/2022 Not Taking  Werner, MICHEAL Fulton CNP     mupirocin (BACTROBAN) 2 % external ointment Apply topically 3 times daily  Patient not taking: Reported on 11/16/2022 Not Taking  Kathy Caldera APRN CNP

## 2022-11-17 NOTE — PROGRESS NOTES
COLON & RECTAL SURGERY  PROGRESS NOTE    November 17, 2022    SUBJECTIVE:  Patient reports pain is better today.  No nausea.      OBJECTIVE:  Temp:  [98.4  F (36.9  C)-102.6  F (39.2  C)] 98.9  F (37.2  C)  Pulse:  [] 95  Resp:  [16-18] 18  BP: (130-142)/(81-91) 134/81  SpO2:  [94 %-100 %] 95 %  No intake or output data in the 24 hours ending 11/17/22 1036    GENERAL:  Awake, alert, no acute distress, lying in bed.  HEAD: Nomocephalic atraumatic  SCLERA: anicteric  EXTREMITIES: warm and well perfused  ABDOMEN:  Soft, tender in LLQ, non-distended, no rebound or guarding, no peritoneal signs.    LABS:  Lab Results   Component Value Date    WBC 22.6 11/17/2022     Lab Results   Component Value Date    HGB 12.6 11/17/2022     Lab Results   Component Value Date    HCT 37.6 11/17/2022     Lab Results   Component Value Date     11/17/2022     Last Basic Metabolic Panel:  Lab Results   Component Value Date     11/17/2022      Lab Results   Component Value Date    POTASSIUM 4.0 11/17/2022     Lab Results   Component Value Date    CHLORIDE 102 11/17/2022     Lab Results   Component Value Date    HERI 8.6 11/17/2022     Lab Results   Component Value Date    CO2 24 11/17/2022     Lab Results   Component Value Date    BUN 9.4 11/17/2022     Lab Results   Component Value Date    CR 1.01 11/17/2022     Lab Results   Component Value Date    GLC 91 11/17/2022       ASSESSMENT/PLAN:  32 year old man readmitted with diverticulitis and pericolonic phlegmonous changes.  Not amenable to IR drainage.      - NPO, IVF  - IV antibiotics  - Pain management as needed  - Encourage ambulation   - No plans for surgery at this time  - Consider repeat CT in 1-2 days to reevaluate if no improvement    For questions/paging, please contact the [facility] office at [phone].    Sheryl Carter PA-C  [facility]  Phone: [phone]

## 2022-11-17 NOTE — ED NOTES
[facility]  ED Nurse Handoff Report    Tony Bonilla is a 32 year old male   ED Chief complaint: Abdominal Pain and CT Results  . ED Diagnosis:   Final diagnoses:   Sigmoid diverticulitis   Colonic diverticular abscess     Allergies:   Allergies   Allergen Reactions     Cefaclor GI Disturbance     Ciprofloxacin Muscle Pain (Myalgia)     Achilles pain       Code Status: Full Code  Activity level - Baseline/Home:  Independent. Activity Level - Current:   Independent. Lift room needed: No. Bariatric: No   Needed: No   Isolation: No. Infection: Not Applicable.     Vital Signs:   Vitals:    11/16/22 1540   BP: (!) 140/87   Pulse: 98   Resp: 16   Temp: 98.4  F (36.9  C)   TempSrc: Temporal   SpO2: 99%       Cardiac Rhythm:  ,      Pain level:    Patient confused: No. Patient Falls Risk: No.   Elimination Status: Has voided   Patient Report - Initial Complaint: Tony Bonilla is a 32 year old male with history of sigmoid diverticulitis with perforation who presents with worsening lower abdominal pain beginning two days ago. Patient was recently admitted to [facility] from 10/31-11/3 for sepsis secondary to sigmoid diverticulitis and received IV antibiotics. He did not have surgery at this time. He was discharged home on a course of Cipro and says he felt much better upon discharge from the hospital and felt well until his current pain began. Patient finished this course of antibiotics three days ago. He also endorses diarrhea, dark colored urine, diaphoresis, chills, and a fever of 101 F.  He was seen in clinic today for these symptoms and had imaging and laboratory work performed (see results below). He also was given 30 mg Toradol, 1L NS, and 4 mg Zofran while in clinic. Patient was sent to the ED for further workup, hospital admission, and surgical intervention by colorectal surgery. No previous history of abdominal surgeries. . Focused Assessment: Pain controlled with IV dilaudid.   Tests  Performed: Labs, imaging (prior to arrival to ED). Abnormal Results:   Abnormal Labs Resulted from Time of ED Arrival to Time of ED Departure   HEPATIC FUNCTION PANEL - Abnormal       Result Value    Protein Total 7.0      Albumin 3.8      Bilirubin Total 2.3 (*)     Alkaline Phosphatase 94      AST 28      ALT 46      Bilirubin Direct 0.75 (*)      No orders to display   CT Abdomen/Pelvis w Contrast Results (11/16/2022):  1.  Previously described changes of acute sigmoid diverticulitis are  again noted.  2.  In the region of the previously noted contained perforation  adjacent to the sigmoid colon, there is a 6.2 cm area of associated  phlegmonous inflammatory change. A developing abscess in this location  cannot be excluded. Clinical correlation and close follow-up are  recommended.  3.  Hepatic steatosis.     Laboratory Results (11/16/2022):  CBC: WBC (25.6), Platelet (513), Absolute Neutrophils (21.3), Absolute Monocytes (2.3)  UA: Color (orange), Bilirubin (small), Blood urine (trace), Protein Albumin (100), Mucus (present)  Treatments provided: IV ABX, pain control.   Family Comments: Spouse at bedside.   OBS brochure/video discussed/provided to patient:  Yes  ED Medications:   Medications   HYDROmorphone (PF) (DILAUDID) injection 0.5 mg (0.5 mg Intravenous Given 11/16/22 1726)   lidocaine 1 % 0.1-1 mL (has no administration in time range)   lidocaine (LMX4) cream (has no administration in time range)   sodium chloride (PF) 0.9% PF flush 3 mL (has no administration in time range)   sodium chloride (PF) 0.9% PF flush 3 mL (has no administration in time range)   sodium chloride 0.9% infusion (has no administration in time range)   acetaminophen (TYLENOL) tablet 650 mg (has no administration in time range)   oxyCODONE (ROXICODONE) tablet 5 mg (has no administration in time range)   HYDROmorphone (PF) (DILAUDID) injection 0.3-0.5 mg (has no administration in time range)   ondansetron (ZOFRAN ODT) ODT tab 4 mg (has  no administration in time range)     Or   ondansetron (ZOFRAN) injection 4 mg (has no administration in time range)   prochlorperazine (COMPAZINE) injection 10 mg (has no administration in time range)     Or   prochlorperazine (COMPAZINE) tablet 10 mg (has no administration in time range)     Or   prochlorperazine (COMPAZINE) suppository 25 mg (has no administration in time range)   famotidine (PEPCID) injection 20 mg (20 mg Intravenous Given 11/16/22 1726)   piperacillin-tazobactam (ZOSYN) intermittent infusion 4.5 g (has no administration in time range)   0.9% sodium chloride BOLUS (1,000 mLs Intravenous New Bag 11/16/22 1635)   piperacillin-tazobactam (ZOSYN) intermittent infusion 4.5 g (4.5 g Intravenous New Bag 11/16/22 1726)     Drips infusing:  No  For the majority of the shift, the patient's behavior Green. Interventions performed were N/A.    Sepsis treatment initiated: No     Patient tested for COVID 19 prior to admission: YES    ED Nurse Name/Phone Number: Ana Portillo RN,   6:21 PM    RECEIVING UNIT ED HANDOFF REVIEW    Above ED Nurse Handoff Report was reviewed: Yes  Reviewed by: Delonte Golden RN on November 17, 2022 at 4:49 PM

## 2022-11-18 ENCOUNTER — APPOINTMENT (OUTPATIENT)
Dept: CT IMAGING | Facility: CLINIC | Age: 32
End: 2022-11-18
Attending: COLON & RECTAL SURGERY
Payer: COMMERCIAL

## 2022-11-18 LAB
ALBUMIN SERPL BCG-MCNC: 3.2 G/DL (ref 3.5–5.2)
ALP SERPL-CCNC: 141 U/L (ref 40–129)
ALT SERPL W P-5'-P-CCNC: 36 U/L (ref 10–50)
ANION GAP SERPL CALCULATED.3IONS-SCNC: 10 MMOL/L (ref 7–15)
AST SERPL W P-5'-P-CCNC: 20 U/L (ref 10–50)
BILIRUB SERPL-MCNC: 2.8 MG/DL
BUN SERPL-MCNC: 5.8 MG/DL (ref 6–20)
CALCIUM SERPL-MCNC: 8.9 MG/DL (ref 8.6–10)
CHLORIDE SERPL-SCNC: 101 MMOL/L (ref 98–107)
CREAT SERPL-MCNC: 0.89 MG/DL (ref 0.67–1.17)
DEPRECATED HCO3 PLAS-SCNC: 26 MMOL/L (ref 22–29)
ERYTHROCYTE [DISTWIDTH] IN BLOOD BY AUTOMATED COUNT: 12.4 % (ref 10–15)
GFR SERPL CREATININE-BSD FRML MDRD: >90 ML/MIN/1.73M2
GLUCOSE SERPL-MCNC: 110 MG/DL (ref 70–99)
HCT VFR BLD AUTO: 38.3 % (ref 40–53)
HGB BLD-MCNC: 12.4 G/DL (ref 13.3–17.7)
LACTATE SERPL-SCNC: 0.5 MMOL/L (ref 0.7–2)
MAGNESIUM SERPL-MCNC: 2.2 MG/DL (ref 1.7–2.3)
MCH RBC QN AUTO: 32.5 PG (ref 26.5–33)
MCHC RBC AUTO-ENTMCNC: 32.4 G/DL (ref 31.5–36.5)
MCV RBC AUTO: 100 FL (ref 78–100)
PLATELET # BLD AUTO: 392 10E3/UL (ref 150–450)
POTASSIUM SERPL-SCNC: 3.9 MMOL/L (ref 3.4–5.3)
PROT SERPL-MCNC: 6.3 G/DL (ref 6.4–8.3)
RBC # BLD AUTO: 3.82 10E6/UL (ref 4.4–5.9)
SODIUM SERPL-SCNC: 137 MMOL/L (ref 136–145)
WBC # BLD AUTO: 18 10E3/UL (ref 4–11)

## 2022-11-18 PROCEDURE — 83735 ASSAY OF MAGNESIUM: CPT | Performed by: INTERNAL MEDICINE

## 2022-11-18 PROCEDURE — 250N000011 HC RX IP 250 OP 636: Performed by: PHYSICIAN ASSISTANT

## 2022-11-18 PROCEDURE — 250N000011 HC RX IP 250 OP 636: Performed by: STUDENT IN AN ORGANIZED HEALTH CARE EDUCATION/TRAINING PROGRAM

## 2022-11-18 PROCEDURE — 99233 SBSQ HOSP IP/OBS HIGH 50: CPT | Performed by: INTERNAL MEDICINE

## 2022-11-18 PROCEDURE — 120N000001 HC R&B MED SURG/OB

## 2022-11-18 PROCEDURE — 258N000001 HC RX 258: Performed by: INTERNAL MEDICINE

## 2022-11-18 PROCEDURE — 250N000013 HC RX MED GY IP 250 OP 250 PS 637: Performed by: INTERNAL MEDICINE

## 2022-11-18 PROCEDURE — 250N000011 HC RX IP 250 OP 636: Performed by: INTERNAL MEDICINE

## 2022-11-18 PROCEDURE — 85027 COMPLETE CBC AUTOMATED: CPT | Performed by: INTERNAL MEDICINE

## 2022-11-18 PROCEDURE — 250N000009 HC RX 250: Performed by: STUDENT IN AN ORGANIZED HEALTH CARE EDUCATION/TRAINING PROGRAM

## 2022-11-18 PROCEDURE — 36415 COLL VENOUS BLD VENIPUNCTURE: CPT | Performed by: INTERNAL MEDICINE

## 2022-11-18 PROCEDURE — 83605 ASSAY OF LACTIC ACID: CPT | Performed by: INTERNAL MEDICINE

## 2022-11-18 PROCEDURE — 80053 COMPREHEN METABOLIC PANEL: CPT | Performed by: INTERNAL MEDICINE

## 2022-11-18 PROCEDURE — 74177 CT ABD & PELVIS W/CONTRAST: CPT

## 2022-11-18 RX ORDER — SENNOSIDES 8.6 MG
1 TABLET ORAL 2 TIMES DAILY PRN
Status: DISCONTINUED | OUTPATIENT
Start: 2022-11-18 | End: 2022-11-22 | Stop reason: HOSPADM

## 2022-11-18 RX ORDER — MORPHINE SULFATE 2 MG/ML
4 INJECTION, SOLUTION INTRAMUSCULAR; INTRAVENOUS EVERY 4 HOURS PRN
Status: DISCONTINUED | OUTPATIENT
Start: 2022-11-18 | End: 2022-11-22 | Stop reason: HOSPADM

## 2022-11-18 RX ORDER — NALOXONE HYDROCHLORIDE 0.4 MG/ML
0.4 INJECTION, SOLUTION INTRAMUSCULAR; INTRAVENOUS; SUBCUTANEOUS
Status: DISCONTINUED | OUTPATIENT
Start: 2022-11-18 | End: 2022-11-22 | Stop reason: HOSPADM

## 2022-11-18 RX ORDER — NALOXONE HYDROCHLORIDE 0.4 MG/ML
0.2 INJECTION, SOLUTION INTRAMUSCULAR; INTRAVENOUS; SUBCUTANEOUS
Status: DISCONTINUED | OUTPATIENT
Start: 2022-11-18 | End: 2022-11-22 | Stop reason: HOSPADM

## 2022-11-18 RX ORDER — OXYCODONE HYDROCHLORIDE 5 MG/1
5-10 TABLET ORAL EVERY 4 HOURS PRN
Status: DISCONTINUED | OUTPATIENT
Start: 2022-11-18 | End: 2022-11-22 | Stop reason: HOSPADM

## 2022-11-18 RX ORDER — IOPAMIDOL 755 MG/ML
500 INJECTION, SOLUTION INTRAVASCULAR ONCE
Status: COMPLETED | OUTPATIENT
Start: 2022-11-18 | End: 2022-11-18

## 2022-11-18 RX ADMIN — POTASSIUM CHLORIDE, DEXTROSE MONOHYDRATE AND SODIUM CHLORIDE: 150; 5; 900 INJECTION, SOLUTION INTRAVENOUS at 09:14

## 2022-11-18 RX ADMIN — OXYCODONE HYDROCHLORIDE 5 MG: 5 TABLET ORAL at 16:35

## 2022-11-18 RX ADMIN — OXYCODONE HYDROCHLORIDE 5 MG: 5 TABLET ORAL at 11:35

## 2022-11-18 RX ADMIN — MORPHINE SULFATE 4 MG: 2 INJECTION, SOLUTION INTRAMUSCULAR; INTRAVENOUS at 00:19

## 2022-11-18 RX ADMIN — OXYCODONE HYDROCHLORIDE 5 MG: 5 TABLET ORAL at 22:40

## 2022-11-18 RX ADMIN — TAZOBACTAM SODIUM AND PIPERACILLIN SODIUM 4.5 G: 500; 4 INJECTION, SOLUTION INTRAVENOUS at 11:31

## 2022-11-18 RX ADMIN — SODIUM CHLORIDE 65 ML: 9 INJECTION, SOLUTION INTRAVENOUS at 18:35

## 2022-11-18 RX ADMIN — TAZOBACTAM SODIUM AND PIPERACILLIN SODIUM 4.5 G: 500; 4 INJECTION, SOLUTION INTRAVENOUS at 04:38

## 2022-11-18 RX ADMIN — FAMOTIDINE 20 MG: 10 INJECTION INTRAVENOUS at 16:36

## 2022-11-18 RX ADMIN — TAZOBACTAM SODIUM AND PIPERACILLIN SODIUM 4.5 G: 500; 4 INJECTION, SOLUTION INTRAVENOUS at 16:39

## 2022-11-18 RX ADMIN — MORPHINE SULFATE 4 MG: 2 INJECTION, SOLUTION INTRAMUSCULAR; INTRAVENOUS at 14:04

## 2022-11-18 RX ADMIN — IOPAMIDOL 100 ML: 755 INJECTION, SOLUTION INTRAVENOUS at 18:35

## 2022-11-18 RX ADMIN — POTASSIUM CHLORIDE, DEXTROSE MONOHYDRATE AND SODIUM CHLORIDE: 150; 5; 900 INJECTION, SOLUTION INTRAVENOUS at 18:02

## 2022-11-18 RX ADMIN — TAZOBACTAM SODIUM AND PIPERACILLIN SODIUM 4.5 G: 500; 4 INJECTION, SOLUTION INTRAVENOUS at 22:40

## 2022-11-18 RX ADMIN — FAMOTIDINE 20 MG: 10 INJECTION INTRAVENOUS at 04:38

## 2022-11-18 RX ADMIN — MORPHINE SULFATE 4 MG: 2 INJECTION, SOLUTION INTRAMUSCULAR; INTRAVENOUS at 20:14

## 2022-11-18 RX ADMIN — OXYCODONE HYDROCHLORIDE 5 MG: 5 TABLET ORAL at 04:45

## 2022-11-18 RX ADMIN — SENNOSIDES 1 TABLET: 8.6 TABLET, FILM COATED ORAL at 04:59

## 2022-11-18 RX ADMIN — MORPHINE SULFATE 4 MG: 2 INJECTION, SOLUTION INTRAMUSCULAR; INTRAVENOUS at 09:23

## 2022-11-18 ASSESSMENT — ACTIVITIES OF DAILY LIVING (ADL)
ADLS_ACUITY_SCORE: 18

## 2022-11-18 NOTE — CONSULTS
"CLINICAL NUTRITION SERVICES  -  ASSESSMENT NOTE      Recommendations:   - Diet per CRS team.      MALNUTRITION:  % Weight Loss:  Weight loss does not meet criteria for malnutrition   % Intake:  Decreased intake does not meet criteria for malnutrition   Subcutaneous Fat Loss:  None observed  Muscle Loss:  None observed  Fluid Retention: None documented    Malnutrition Diagnosis: Patient does not meet two of the above criteria necessary for diagnosing malnutrition          REASON FOR ASSESSMENT  Tony Bonilla is a 32 year old male seen by Registered Dietitian for Admission Nutrition Risk Screen for positive.    PMH of: Recent admit for sepsis with acute perforated diverticulitis.    Admit 2/2: Abdominal pain, abscess concerns.    NUTRITION HISTORY  - Information obtained from patient and chart.  - Not followed by RD team during recent admit.  - Diet at home: Has been following low fiber since last hospital discharge per report.  Is able to verbalize low fiber foods and has also been reading food labels for <2 g fiber/serving, per report (impressive!).    - Usual intakes: At baseline has meals BID (lunch at work, dinner).  Notes he hasn't been eating lunch at work, not d/t low appetite, nausea or pain, just because he doesn't want to \"push things\" when working.  He is still having a dinner meal and when discussing wt changes/wt loss, feels he may have lost weight because he's no longer \"having liquor\" in the evenings and no longer having fast food.  - Use of oral supplements: None.  - Allergies: NKFA.      CURRENT NUTRITION ORDERS  Diet Order:     NPO    Current Intake/Tolerance:  NPO since admit.  Overall limited timeframe since admit.  Noted CRS note this AM indicates \"NPO for now but if no drain or after drain placed, can start clears\".  He is looking forward to diet advancement, even just water or clear liquids.      Obtained from Chart/Interdisciplinary Team:  - No documentation of PI  - Stooling patterns " "reviewed    ANTHROPOMETRICS  Height: 5' 9.5\"  Weight: 232 lbs 12.8 oz  Body mass index is 33.89 kg/m .  Weight Status:  Obesity Grade I BMI 30-34.9  Weight History:  Wt Readings from Last 10 Encounters:   11/17/22 105.6 kg (232 lb 12.8 oz)   11/16/22 105 kg (231 lb 6.4 oz)   11/10/22 107.2 kg (236 lb 6.4 oz)   11/01/22 108.5 kg (239 lb 3.2 oz)   10/31/22 110.2 kg (243 lb)     - 3% wt loss in the past ~month based on above.  As documented above, suspect this may in part be d/t a change in eating with recommendations for low fiber and cutting out alcohol + fast food.    - Confirmed with Tony and he reports UBW is generally closer to ~242# prior to losses.    LABS  Labs reviewed.      MEDICATIONS  Medications reviewed.      ASSESSED NUTRITION NEEDS PER APPROVED PRACTICE GUIDELINES:    Dosing Weight 106 kg   Estimated Energy Needs: >/=20 Kcal/Kg  Justification: maintenance  Estimated Protein Needs: >/=1 g pro/Kg  Justification: preservation of lean body mass  Estimated Fluid Needs: per MD      NUTRITION DIAGNOSIS:  Inadequate oral intake related to NPO for imaging and possible drain placement as evidenced by meeting <50% needs since admit.    NUTRITION INTERVENTIONS  Recommendations / Nutrition Prescription  See above.      Implementation  Nutrition education: Provided education on RD following peripherally during admit.  Diet per MD teams.    Collaboration and Referral of Nutrition care: Discussed POC with team during rounds.     Nutrition Goals  Diet advancement past NPO w/in 24-72 hrs.       MONITORING AND EVALUATION:  Progress towards goals will be monitored and evaluated per protocol and Practice Guidelines          Indy Grijalva RDN, LD  Clinical Dietitian  3rd floor/ICU: [phone]  All other floors: [phone]  Weekend/holiday: [phone]  Office: [phone]  "

## 2022-11-18 NOTE — PLAN OF CARE
Vitals: WNL  Neuro: A/O x4  Cardiac: normal rate and rhythm  Resp: on room air, no SoB  GI: no n/v  : continent  Skin: no pertinent skin issues  Activity: independent  Pain: abdominal pain/discomfort - takes PRN IV dilaudid and oral oxycodone     Plan: NPO, IVF, Zosyn, pain management, ambulation

## 2022-11-18 NOTE — PROGRESS NOTES
[facility]  Hospitalist Progress Note  John Rodriguez M.D., M.B.A.   11/18/2022    Reason for Stay/active problem list      Acute sigmoid diverticulitis with contained perforation and sepsis         Assessment and Plan:        Summary of Stay: Tony Bonilla is a 32 year old male who was recently hospitalized 10/31/2022 - 11/3/2022 for Sepsis due to Acute Perforated Diverticulitis who presents to the ED with abdominal pain.      Problem List with Assessment and Plan:       Sepsis 2/2 Acute Diverticulitis with possible phlegmon versus abscess - pt presents with three days of LLQ abdominal pain, one day of  emesis and two days of non bloody diarrhea.  Pt. recently completed a course of antibiotics on 11/13 for Diverticulitis.   -- Patient was febrile on admission with temp of 101.2, wbc 25.6 and CT abd/pelvis revealed ongoing sigmoid diverticulitis an concern for a developing abscess and contained perforation.  --Patient was admitted and was treated with bowel rest, IV pain medications, IV fluids, IV antibiotic and colorectal surgery was consulted.  --Currently, patient is stable afebrile.  Stable hemodynamically.  He was seen by colorectal surgery and plans noted for conservative management for now.  Continue antibiotic with Zosyn, monitor vital signs and symptoms and reach out to colorectal surgery team if urgent surgery is needed  -- Possibility of IR drainage per radiology and colorectal surgery     Elevated total bilirubin: Serum bilirubin total of 2.8 on admission.  Etiology unclear, CT of the abdomen showed hepatic steatosis.    -- Currently alk phos is 141, but normal ALT and AST.  Continue to monitor LFTs      Plan for today:    Diet per colorectal surgery    Interventional radiology consult for possibility of IR drainage today    Continue current care plan      VTE Prophylaxis: Pneumatic Compression Devices  Code Status: Full Code  Diet: NPO for Medical/Clinical Reasons Except for:  "Meds    Zavala Catheter: Not present    Family updated today: No     Disposition: May discharge early next week if he did not need surgery.          Interval History (Subjective):        Patient is seen and examined by me today and medical record reviewed.Overnight events noted and care discussed with nursing staff.  Patient's pain is much better.  He has no fever this morning but had fever last night.  Denies nausea or vomiting.  No diarrhea.  Care plan was discussed with bedside nurse and colorectal surgery plan was noted.                  Physical Exam:        Last Vital Signs:  /77 (BP Location: Right arm)   Pulse 79   Temp 98.7  F (37.1  C) (Oral)   Resp 18   Ht 1.765 m (5' 9.5\")   Wt 105.6 kg (232 lb 12.8 oz)   SpO2 95%   BMI 33.89 kg/m      No intake/output data recorded.    Wt Readings from Last 5 Encounters:   11/17/22 105.6 kg (232 lb 12.8 oz)   11/16/22 105 kg (231 lb 6.4 oz)   11/10/22 107.2 kg (236 lb 6.4 oz)   11/01/22 108.5 kg (239 lb 3.2 oz)   10/31/22 110.2 kg (243 lb)        Constitutional: Awake, alert, cooperative, no apparent distress     Respiratory: Clear to auscultation bilaterally, no crackles or wheezing   Cardiovascular: Regular rate and rhythm, normal S1 and S2, and no murmur noted   Abdomen:  abdomen is soft, tenderness noted in the lower abdomen.  No rebound tenderness guarding or rigidity.  Bowel sounds normoactive   Skin: No new rashes, no cyanosis, dry to touch   Neuro: Alert with  no new focal weakness   Extremities: No edema   Other(s):        All other systems: Negative          Medications:        All current medications were reviewed with changes reflected in problem list.         Data:      All new lab and imaging data was reviewed.      Data reviewed today: I reviewed all new labs and imaging results over the last 24 hours. I personally reviewed       Recent Labs   Lab 11/18/22  0649 11/17/22  0817 11/16/22  1207   WBC 18.0* 22.6* 25.6*   HGB 12.4* 12.6* 15.2   HCT " 38.3* 37.6* 46.7    99 99    429 513*     No results for input(s): CULT in the last 168 hours.  Recent Labs   Lab 11/18/22  0649 11/17/22  0817 11/17/22  0051 11/16/22  1634    138  --  138   POTASSIUM 3.9 4.0  --  3.9   CHLORIDE 101 102  --  100   CO2 26 24  --  25   ANIONGAP 10 12  --  13   * 91  --  96   BUN 5.8* 9.4  --  10.2   CR 0.89 1.01  --  1.17   GFRESTIMATED >90 >90  --  85   HERI 8.9 8.6  --  9.2   MAG 2.2  --  2.0 2.1   PROTTOTAL 6.3* 6.2*  --  7.0   ALBUMIN 3.2* 3.3*  --  3.8   BILITOTAL 2.8* 2.8*  --  2.3*   ALKPHOS 141* 96  --  94   AST 20 21  --  28   ALT 36 38  --  46       Recent Labs   Lab 11/18/22  0649 11/17/22  0817 11/16/22  1634   * 91 96       No results for input(s): INR in the last 168 hours.      No results for input(s): TROPONIN, TROPI, TROPR in the last 168 hours.    Invalid input(s): TROP, TROPONINIES    Recent Results (from the past 48 hour(s))   CT Abdomen Pelvis w Contrast    Narrative    CT ABDOMEN/PELVIS WITH CONTRAST November 16, 2022 12:47 PM    CLINICAL HISTORY: Left lower quadrant pain.    TECHNIQUE: CT scan of the abdomen and pelvis was performed following  injection of IV contrast. Multiplanar reformats were obtained. Dose  reduction techniques were used.  CONTRAST: 100mL Isovue 370.    COMPARISON: CT of the abdomen and pelvis performed 10/31/2022.    FINDINGS:   LOWER CHEST: The visualized lung bases are clear.    HEPATOBILIARY: Hepatic steatosis. No hepatic masses are seen.    PANCREAS: Normal.    SPLEEN: Normal.    ADRENAL GLANDS: Normal.    KIDNEYS/BLADDER: Unremarkable. No hydronephrosis.    BOWEL: Scattered colonic diverticulosis. Prominent bowel wall  thickening with surrounding inflammatory stranding in the sigmoid  colon, similar to the previous exam, and consistent with acute  diverticulitis. An area of ill-defined hypodensity adjacent to the  thickened sigmoid colon in the region of the previously described  contained perforation  measures 6.2 x 4.7 cm (2/168), suspicious for  phlegmonous inflammation. No associated rim enhancement is identified.  There are a few small adjacent extraluminal air bubbles (2/149). No  bowel obstruction. Unremarkable appendix.    PELVIC ORGANS: Unremarkable.    LYMPH NODES: No enlarged lymph nodes are identified in the abdomen or  pelvis.    VASCULATURE: Unremarkable.    ADDITIONAL FINDINGS: None.    MUSCULOSKELETAL: Unremarkable.      Impression    IMPRESSION:   1.  Previously described changes of acute sigmoid diverticulitis are  again noted.  2.  In the region of the previously noted contained perforation  adjacent to the sigmoid colon, there is a 6.2 cm area of associated  phlegmonous inflammatory change. A developing abscess in this location  cannot be excluded. Clinical correlation and close follow-up are  recommended.  3.  Hepatic steatosis.    CALEB HALE MD         SYSTEM ID:  [id number]       COVID Status:  COVID-19 PCR Results    COVID-19 PCR Results 10/31/22 11/16/22   SARS CoV2 PCR Negative Negative      Comments are available for some flowsheets but are not being displayed.         COVID-19 Antibody Results, Testing for Immunity    COVID-19 Antibody Results, Testing for Immunity   No data to display.              Disclaimer: This note consists of symbols derived from keyboarding, dictation and/or voice recognition software. As a result, there may be errors in the script that have gone undetected. Please consider this when interpreting information found in this chart.

## 2022-11-18 NOTE — PLAN OF CARE
Pertinent assessments: Pt A&Ox4. VSS, on RA. Up independent in the room. Temp 101.5, tylenol given. C/o lower abd pain, morphine and oxy given x1 with relief. Denied nausea. C/o constipation senna given. Slept well.  Major Shift Events none  Treatment Plan: pain management. IVF, NPO, zosyn.   Bedside Nurse: Maddy López RN

## 2022-11-18 NOTE — PROGRESS NOTES
"Colon & Rectal Surgery Progress Note             Interval History:   HD#3  Bloated, + flatus, no significant stool. No fevers.  Up walking, showered                Medications:   I have reviewed this patient's current medications               Physical Exam:   Blood pressure 128/80, pulse 85, temperature 98.5  F (36.9  C), temperature source Oral, resp. rate 17, height 1.765 m (5' 9.5\"), weight 105.6 kg (232 lb 12.8 oz), SpO2 96 %.  No intake or output data in the 24 hours ending 11/18/22 0820  GEN:  alert  ABD:  Soft with focal tenderness in the LLQ         Data:        Lab Results   Component Value Date     11/18/2022    Lab Results   Component Value Date    CHLORIDE 101 11/18/2022    Lab Results   Component Value Date    BUN 5.8 11/18/2022      Lab Results   Component Value Date    POTASSIUM 3.9 11/18/2022    Lab Results   Component Value Date    CO2 26 11/18/2022    Lab Results   Component Value Date    CR 0.89 11/18/2022    CR 1.01 11/17/2022        Lab Results   Component Value Date    HGB 12.4 (L) 11/18/2022    HGB 12.6 (L) 11/17/2022     Lab Results   Component Value Date     11/18/2022     11/17/2022     Lab Results   Component Value Date    WBC 18.0 (H) 11/18/2022    WBC 22.6 (H) 11/17/2022            Assessment and Plan:   Improved wbc and exam.   Will talk with IR regarding timing of follow up CT and possible IR drain.   NPO for now but if no drain or after drain placed can start clears.      Colette White MD  [facility]  Office Phone # [phone]    "

## 2022-11-19 LAB
ERYTHROCYTE [DISTWIDTH] IN BLOOD BY AUTOMATED COUNT: 12.1 % (ref 10–15)
HCT VFR BLD AUTO: 40.8 % (ref 40–53)
HGB BLD-MCNC: 13.4 G/DL (ref 13.3–17.7)
MAGNESIUM SERPL-MCNC: 2.2 MG/DL (ref 1.7–2.3)
MCH RBC QN AUTO: 32.6 PG (ref 26.5–33)
MCHC RBC AUTO-ENTMCNC: 32.8 G/DL (ref 31.5–36.5)
MCV RBC AUTO: 99 FL (ref 78–100)
PLATELET # BLD AUTO: 467 10E3/UL (ref 150–450)
POTASSIUM SERPL-SCNC: 4.2 MMOL/L (ref 3.4–5.3)
RBC # BLD AUTO: 4.11 10E6/UL (ref 4.4–5.9)
WBC # BLD AUTO: 8.5 10E3/UL (ref 4–11)

## 2022-11-19 PROCEDURE — 84132 ASSAY OF SERUM POTASSIUM: CPT | Performed by: INTERNAL MEDICINE

## 2022-11-19 PROCEDURE — 250N000011 HC RX IP 250 OP 636: Performed by: INTERNAL MEDICINE

## 2022-11-19 PROCEDURE — 83735 ASSAY OF MAGNESIUM: CPT | Performed by: INTERNAL MEDICINE

## 2022-11-19 PROCEDURE — 258N000001 HC RX 258: Performed by: INTERNAL MEDICINE

## 2022-11-19 PROCEDURE — 250N000011 HC RX IP 250 OP 636: Performed by: PHYSICIAN ASSISTANT

## 2022-11-19 PROCEDURE — 36415 COLL VENOUS BLD VENIPUNCTURE: CPT | Performed by: INTERNAL MEDICINE

## 2022-11-19 PROCEDURE — 250N000013 HC RX MED GY IP 250 OP 250 PS 637: Performed by: INTERNAL MEDICINE

## 2022-11-19 PROCEDURE — 99232 SBSQ HOSP IP/OBS MODERATE 35: CPT | Performed by: INTERNAL MEDICINE

## 2022-11-19 PROCEDURE — 85027 COMPLETE CBC AUTOMATED: CPT | Performed by: INTERNAL MEDICINE

## 2022-11-19 PROCEDURE — 120N000001 HC R&B MED SURG/OB

## 2022-11-19 RX ADMIN — MORPHINE SULFATE 4 MG: 2 INJECTION, SOLUTION INTRAMUSCULAR; INTRAVENOUS at 19:41

## 2022-11-19 RX ADMIN — POTASSIUM CHLORIDE, DEXTROSE MONOHYDRATE AND SODIUM CHLORIDE: 150; 5; 900 INJECTION, SOLUTION INTRAVENOUS at 01:51

## 2022-11-19 RX ADMIN — POTASSIUM CHLORIDE, DEXTROSE MONOHYDRATE AND SODIUM CHLORIDE: 150; 5; 900 INJECTION, SOLUTION INTRAVENOUS at 11:22

## 2022-11-19 RX ADMIN — OXYCODONE HYDROCHLORIDE 5 MG: 5 TABLET ORAL at 04:46

## 2022-11-19 RX ADMIN — TAZOBACTAM SODIUM AND PIPERACILLIN SODIUM 4.5 G: 500; 4 INJECTION, SOLUTION INTRAVENOUS at 22:41

## 2022-11-19 RX ADMIN — POTASSIUM CHLORIDE, DEXTROSE MONOHYDRATE AND SODIUM CHLORIDE: 150; 5; 900 INJECTION, SOLUTION INTRAVENOUS at 19:47

## 2022-11-19 RX ADMIN — MORPHINE SULFATE 4 MG: 2 INJECTION, SOLUTION INTRAMUSCULAR; INTRAVENOUS at 02:44

## 2022-11-19 RX ADMIN — TAZOBACTAM SODIUM AND PIPERACILLIN SODIUM 4.5 G: 500; 4 INJECTION, SOLUTION INTRAVENOUS at 17:05

## 2022-11-19 RX ADMIN — MORPHINE SULFATE 4 MG: 2 INJECTION, SOLUTION INTRAMUSCULAR; INTRAVENOUS at 14:16

## 2022-11-19 RX ADMIN — TAZOBACTAM SODIUM AND PIPERACILLIN SODIUM 4.5 G: 500; 4 INJECTION, SOLUTION INTRAVENOUS at 04:47

## 2022-11-19 RX ADMIN — OXYCODONE HYDROCHLORIDE 5 MG: 5 TABLET ORAL at 09:18

## 2022-11-19 RX ADMIN — TAZOBACTAM SODIUM AND PIPERACILLIN SODIUM 4.5 G: 500; 4 INJECTION, SOLUTION INTRAVENOUS at 11:35

## 2022-11-19 RX ADMIN — FAMOTIDINE 20 MG: 10 INJECTION INTRAVENOUS at 04:47

## 2022-11-19 RX ADMIN — FAMOTIDINE 20 MG: 10 INJECTION INTRAVENOUS at 17:05

## 2022-11-19 ASSESSMENT — ACTIVITIES OF DAILY LIVING (ADL)
ADLS_ACUITY_SCORE: 18

## 2022-11-19 NOTE — PLAN OF CARE
End of Shift Summary  For vital signs and complete assessments, please see documentation flowsheets.     Pertinent assessments: Pt A/Ox4. VSS. Afebrile. WBC down to 8.5 this shift. Morphine 1x and Oxycodone 1x  given for abdominal pain. Denies nausea and SOB. BS active. Up ind.   Major Shift Events:   Treatment Plan: Zosyn, SENA, symptom management  Bedside Nurse: Cheri Walls RN

## 2022-11-19 NOTE — PLAN OF CARE
Goal Outcome Evaluation:       To Do:  End of Shift Summary  For vital signs and complete assessments, please see documentation flowsheets.     Pertinent assessments: Pt A/Ox4. VSS. Afebrile. Morphine 2x and Oxycodone 2x  given for abdo pain. Denies nausea and SOB. C/o feeling bloated. BS active, not passing gas. Up ind.   Major Shift Events CT abdo/pelvis done.  Treatment Plan: Bowel rest, Zosyn, IVF symptom management and poss IR drain.  Bedside Nurse: Savannah Gilbert RN

## 2022-11-19 NOTE — PLAN OF CARE
Pertinent assessments: Pt A&Ox4. VSS, on RA. Up independent in the room. Afebrile this shift. C/o lower abd pain, morphine and oxy given with relief. Going for CT/possible drain placement this evening.  Major Shift Events none  Treatment Plan: pain management. IVF, NPO, zosyn. CT with possible drain   Cheri Walls RN on 11/18/2022 at 6:27 PM

## 2022-11-19 NOTE — PROGRESS NOTES
Colon and Rectal Surgery Progress Note          Assessment and Plan:     HD #4  Repeat CT reviewed.  No drainable abscess  Passing gas.    Clear liquid diet.  Will see how he does with diet advancement.  Needs IV antibiotics though the weekend.      Wilmer Monroy MD FACS FASCRS  Colorectal Surgeon       [facility]  [address], [address]  [place] [address]  T: [phone]  F: [phone]  Pager: [phone]  [email or web address]                 Interval History:     Pain improved.  Passing gas.              Physical Exam:   Vitals were reviewed  Patient Vitals for the past 24 hrs:   BP Temp Temp src Pulse Resp SpO2   11/19/22 0404 119/80 98.4  F (36.9  C) Oral 65 20 94 %   11/18/22 2328 120/74 98.6  F (37  C) Oral 74 18 97 %   11/18/22 2213 126/79 98.6  F (37  C) Oral 73 18 94 %   11/18/22 1642 138/85 98.5  F (36.9  C) Oral 85 16 96 %   11/18/22 1607 (!) 149/94 98.6  F (37  C) Oral 91 16 98 %   11/18/22 1104 123/77 98.7  F (37.1  C) Oral 79 18 95 %         Intake/Output Summary (Last 24 hours) at 11/19/2022 0841  Last data filed at 11/19/2022 0451  Gross per 24 hour   Intake 6593 ml   Output --   Net 6593 ml       Head - Nomocephalic atraumatic  Sclera anicteric  Extremities warm and well perfused  Lungs - breathing non labored,   Abdomen - soft, non distended, minimally tender in lower abdomen  Rectal exam - deferred  Skin - no rash  Psych - affect appropriate  Neuro - no focal deficits             Data:   All laboratory and imaging data in the past 24 hours reviewed    CBC  Lab Results   Component Value Date    WBC 18.0 (H) 11/18/2022    WBC 22.6 (H) 11/17/2022    WBC 25.6 (H) 11/16/2022    HGB 12.4 (L) 11/18/2022    HGB 12.6 (L) 11/17/2022    HGB 15.2 11/16/2022    HCT 38.3 (L) 11/18/2022    HCT 37.6 (L) 11/17/2022    HCT 46.7 11/16/2022     11/18/2022     11/17/2022     (H) 11/16/2022       BMP  Recent Labs   Lab Test 11/18/22  0649 11/17/22  0817    138    POTASSIUM 3.9 4.0   CHLORIDE 101 102   CO2 26 24   ANIONGAP 10 12   * 91   BUN 5.8* 9.4   CR 0.89 1.01   HERI 8.9 8.6       Liver Function Studies -   Recent Labs   Lab Test 11/18/22  0649   PROTTOTAL 6.3*   ALBUMIN 3.2*   BILITOTAL 2.8*   ALKPHOS 141*   AST 20   ALT 36                      Medications:     Current Facility-Administered Medications Ordered in Epic   Medication Dose Route Frequency Last Rate Last Admin     acetaminophen (TYLENOL) tablet 650 mg  650 mg Oral Q6H PRN   650 mg at 11/17/22 2343     dextrose 5% and 0.9% NaCl + KCl 20 mEq/L infusion   Intravenous Continuous 125 mL/hr at 11/19/22 0151 New Bag at 11/19/22 0151     famotidine (PEPCID) injection 20 mg  20 mg Intravenous Q12H   20 mg at 11/19/22 0447     lidocaine (LMX4) cream   Topical Q1H PRN         lidocaine 1 % 0.1-1 mL  0.1-1 mL Other Q1H PRN         morphine (PF) injection 4 mg  4 mg Intravenous Q4H PRN   4 mg at 11/19/22 0244     naloxone (NARCAN) injection 0.2 mg  0.2 mg Intravenous Q2 Min PRN        Or     naloxone (NARCAN) injection 0.4 mg  0.4 mg Intravenous Q2 Min PRN        Or     naloxone (NARCAN) injection 0.2 mg  0.2 mg Intramuscular Q2 Min PRN        Or     naloxone (NARCAN) injection 0.4 mg  0.4 mg Intramuscular Q2 Min PRN         ondansetron (ZOFRAN ODT) ODT tab 4 mg  4 mg Oral Q6H PRN        Or     ondansetron (ZOFRAN) injection 4 mg  4 mg Intravenous Q6H PRN         oxyCODONE (ROXICODONE) tablet 5-10 mg  5-10 mg Oral Q4H PRN   5 mg at 11/19/22 0446     piperacillin-tazobactam (ZOSYN) intermittent infusion 4.5 g  4.5 g Intravenous Q6H 200 mL/hr at 11/19/22 0447 4.5 g at 11/19/22 0447     prochlorperazine (COMPAZINE) injection 10 mg  10 mg Intravenous Q6H PRN        Or     prochlorperazine (COMPAZINE) tablet 10 mg  10 mg Oral Q6H PRN        Or     prochlorperazine (COMPAZINE) suppository 25 mg  25 mg Rectal Q12H PRN         sennosides (SENOKOT) tablet 1 tablet  1 tablet Oral BID PRN   1 tablet at 11/18/22 0452      sodium chloride (PF) 0.9% PF flush 3 mL  3 mL Intracatheter Q8H   3 mL at 11/18/22 0916     sodium chloride (PF) 0.9% PF flush 3 mL  3 mL Intracatheter q1 min prn         No current Psychiatric-ordered outpatient medications on file.

## 2022-11-19 NOTE — PROGRESS NOTES
[facility]  Hospitalist Progress Note  John Rodriguez M.D., M.B.A.   11/19/2022    Reason for Stay/active problem list      Acute sigmoid diverticulitis with contained perforation and sepsis         Assessment and Plan:        Summary of Stay: Tony Bonilla is a 32 year old male who was recently hospitalized 10/31/2022 - 11/3/2022 for Sepsis due to Acute Perforated Diverticulitis who presents to the ED with abdominal pain.      Problem List with Assessment and Plan:       Sepsis 2/2 Acute Diverticulitis with possible phlegmon versus abscess - pt presents with three days of LLQ abdominal pain, one day of  emesis and two days of non bloody diarrhea.  Pt. recently completed a course of antibiotics on 11/13 for Diverticulitis.   -- Patient was febrile on admission with temp of 101.2, wbc 25.6 and CT abd/pelvis revealed ongoing sigmoid diverticulitis an concern for a developing abscess and contained perforation.  --Patient was admitted and was treated with bowel rest, IV pain medications, IV fluids, IV antibiotic and colorectal surgery was consulted.  -- Tony is doing very well at this time.  He has no fever or chills.  His abdominal pain is much better.  He was seen by colorectal surgery team and no plan for intervention radiology drainage or surgical intervention.  Diet advanced to clears per colorectal surgery team.  Continue current management plan and advance diet as tolerated.  Diet advanced defer to colorectal surgery team     Elevated total bilirubin: Serum bilirubin total of 2.8 on admission.  Etiology unclear, CT of the abdomen showed hepatic steatosis.    -- Currently alk phos is 141, but normal ALT and AST.  Continue to monitor LFTs      Plan for today:    Diet per colorectal surgery-currently on clears.    Continue antibiotic, follow vitals and pain control.      VTE Prophylaxis: Pneumatic Compression Devices  Code Status: Full Code  Diet: Clear Liquid Diet    Zavala Catheter: Not  "present    Family updated today: No     Disposition: Anticipate discharge home on Monday if he continues to do well and diet is advanced to low fiber diet          Interval History (Subjective):        Patient is seen and examined by me today and medical record reviewed.Overnight events noted and care discussed with nursing staff.  He is doing very well today.  He denies any fever or chills.  His abdominal pain is much better.  No nausea or vomiting.  He had a bowel movement which is loose stool.  No blood in his stool.  He was seen by colorectal surgery team and recommendation noted to advance his diet.  Tony is concerned about going back to his work.  He is ambulatory.                Physical Exam:        Last Vital Signs:  /79 (BP Location: Left arm)   Pulse 76   Temp 98.6  F (37  C) (Oral)   Resp 18   Ht 1.765 m (5' 9.5\")   Wt 105.6 kg (232 lb 12.8 oz)   SpO2 94%   BMI 33.89 kg/m      I/O last 3 completed shifts:  In: 6593 [I.V.:6593]  Out: -     Wt Readings from Last 5 Encounters:   11/17/22 105.6 kg (232 lb 12.8 oz)   11/16/22 105 kg (231 lb 6.4 oz)   11/10/22 107.2 kg (236 lb 6.4 oz)   11/01/22 108.5 kg (239 lb 3.2 oz)   10/31/22 110.2 kg (243 lb)        Constitutional: Awake, alert, cooperative, no apparent distress     Respiratory: Clear to auscultation bilaterally, no crackles or wheezing   Cardiovascular: Regular rate and rhythm, normal S1 and S2, and no murmur noted   Abdomen:  abdomen is soft, tenderness noted in the lower abdomen.  No rebound tenderness guarding or rigidity.  Bowel sounds normoactive   Skin: No new rashes, no cyanosis, dry to touch   Neuro: Alert with  no new focal weakness   Extremities: No edema   Other(s):        All other systems: Negative          Medications:        All current medications were reviewed with changes reflected in problem list.         Data:      All new lab and imaging data was reviewed.      Data reviewed today: I reviewed all new labs and imaging " results over the last 24 hours. I personally reviewed       Recent Labs   Lab 11/19/22  1202 11/18/22  0649 11/17/22  0817   WBC 8.5 18.0* 22.6*   HGB 13.4 12.4* 12.6*   HCT 40.8 38.3* 37.6*   MCV 99 100 99   * 392 429     No results for input(s): CULT in the last 168 hours.  Recent Labs   Lab 11/19/22  1240 11/18/22  0649 11/17/22  0817 11/17/22  0051 11/16/22  1634   NA  --  137 138  --  138   POTASSIUM 4.2 3.9 4.0  --  3.9   CHLORIDE  --  101 102  --  100   CO2  --  26 24  --  25   ANIONGAP  --  10 12  --  13   GLC  --  110* 91  --  96   BUN  --  5.8* 9.4  --  10.2   CR  --  0.89 1.01  --  1.17   GFRESTIMATED  --  >90 >90  --  85   HERI  --  8.9 8.6  --  9.2   MAG 2.2 2.2  --  2.0 2.1   PROTTOTAL  --  6.3* 6.2*  --  7.0   ALBUMIN  --  3.2* 3.3*  --  3.8   BILITOTAL  --  2.8* 2.8*  --  2.3*   ALKPHOS  --  141* 96  --  94   AST  --  20 21  --  28   ALT  --  36 38  --  46       Recent Labs   Lab 11/18/22  0649 11/17/22  0817 11/16/22  1634   * 91 96       No results for input(s): INR in the last 168 hours.      No results for input(s): TROPONIN, TROPI, TROPR in the last 168 hours.    Invalid input(s): TROP, TROPONINIES    Recent Results (from the past 48 hour(s))   CT Abdomen Pelvis w Contrast    Narrative    CT ABDOMEN/PELVIS WITH CONTRAST November 16, 2022 12:47 PM    CLINICAL HISTORY: Left lower quadrant pain.    TECHNIQUE: CT scan of the abdomen and pelvis was performed following  injection of IV contrast. Multiplanar reformats were obtained. Dose  reduction techniques were used.  CONTRAST: 100mL Isovue 370.    COMPARISON: CT of the abdomen and pelvis performed 10/31/2022.    FINDINGS:   LOWER CHEST: The visualized lung bases are clear.    HEPATOBILIARY: Hepatic steatosis. No hepatic masses are seen.    PANCREAS: Normal.    SPLEEN: Normal.    ADRENAL GLANDS: Normal.    KIDNEYS/BLADDER: Unremarkable. No hydronephrosis.    BOWEL: Scattered colonic diverticulosis. Prominent bowel wall  thickening with  surrounding inflammatory stranding in the sigmoid  colon, similar to the previous exam, and consistent with acute  diverticulitis. An area of ill-defined hypodensity adjacent to the  thickened sigmoid colon in the region of the previously described  contained perforation measures 6.2 x 4.7 cm (2/168), suspicious for  phlegmonous inflammation. No associated rim enhancement is identified.  There are a few small adjacent extraluminal air bubbles (2/149). No  bowel obstruction. Unremarkable appendix.    PELVIC ORGANS: Unremarkable.    LYMPH NODES: No enlarged lymph nodes are identified in the abdomen or  pelvis.    VASCULATURE: Unremarkable.    ADDITIONAL FINDINGS: None.    MUSCULOSKELETAL: Unremarkable.      Impression    IMPRESSION:   1.  Previously described changes of acute sigmoid diverticulitis are  again noted.  2.  In the region of the previously noted contained perforation  adjacent to the sigmoid colon, there is a 6.2 cm area of associated  phlegmonous inflammatory change. A developing abscess in this location  cannot be excluded. Clinical correlation and close follow-up are  recommended.  3.  Hepatic steatosis.    CALEB HALE MD         SYSTEM ID:  [id number]       COVID Status:  COVID-19 PCR Results    COVID-19 PCR Results 10/31/22 11/16/22   SARS CoV2 PCR Negative Negative      Comments are available for some flowsheets but are not being displayed.         COVID-19 Antibody Results, Testing for Immunity    COVID-19 Antibody Results, Testing for Immunity   No data to display.              Disclaimer: This note consists of symbols derived from keyboarding, dictation and/or voice recognition software. As a result, there may be errors in the script that have gone undetected. Please consider this when interpreting information found in this chart.

## 2022-11-20 LAB
ALBUMIN SERPL BCG-MCNC: 3.5 G/DL (ref 3.5–5.2)
ALP SERPL-CCNC: 189 U/L (ref 40–129)
ALT SERPL W P-5'-P-CCNC: 48 U/L (ref 10–50)
ANION GAP SERPL CALCULATED.3IONS-SCNC: 10 MMOL/L (ref 7–15)
AST SERPL W P-5'-P-CCNC: 26 U/L (ref 10–50)
BILIRUB SERPL-MCNC: 1 MG/DL
BUN SERPL-MCNC: 4.7 MG/DL (ref 6–20)
CALCIUM SERPL-MCNC: 9 MG/DL (ref 8.6–10)
CHLORIDE SERPL-SCNC: 102 MMOL/L (ref 98–107)
CREAT SERPL-MCNC: 1.09 MG/DL (ref 0.67–1.17)
DEPRECATED HCO3 PLAS-SCNC: 28 MMOL/L (ref 22–29)
ERYTHROCYTE [DISTWIDTH] IN BLOOD BY AUTOMATED COUNT: 12.3 % (ref 10–15)
GFR SERPL CREATININE-BSD FRML MDRD: >90 ML/MIN/1.73M2
GLUCOSE SERPL-MCNC: 98 MG/DL (ref 70–99)
HCT VFR BLD AUTO: 41.7 % (ref 40–53)
HGB BLD-MCNC: 13.5 G/DL (ref 13.3–17.7)
MAGNESIUM SERPL-MCNC: 2.2 MG/DL (ref 1.7–2.3)
MCH RBC QN AUTO: 32.1 PG (ref 26.5–33)
MCHC RBC AUTO-ENTMCNC: 32.4 G/DL (ref 31.5–36.5)
MCV RBC AUTO: 99 FL (ref 78–100)
PLATELET # BLD AUTO: 450 10E3/UL (ref 150–450)
POTASSIUM SERPL-SCNC: 4.1 MMOL/L (ref 3.4–5.3)
PROT SERPL-MCNC: 6.8 G/DL (ref 6.4–8.3)
RBC # BLD AUTO: 4.2 10E6/UL (ref 4.4–5.9)
SODIUM SERPL-SCNC: 140 MMOL/L (ref 136–145)
WBC # BLD AUTO: 8.4 10E3/UL (ref 4–11)

## 2022-11-20 PROCEDURE — 85014 HEMATOCRIT: CPT | Performed by: INTERNAL MEDICINE

## 2022-11-20 PROCEDURE — 36415 COLL VENOUS BLD VENIPUNCTURE: CPT | Performed by: INTERNAL MEDICINE

## 2022-11-20 PROCEDURE — 250N000011 HC RX IP 250 OP 636: Performed by: PHYSICIAN ASSISTANT

## 2022-11-20 PROCEDURE — 120N000001 HC R&B MED SURG/OB

## 2022-11-20 PROCEDURE — 99232 SBSQ HOSP IP/OBS MODERATE 35: CPT | Performed by: INTERNAL MEDICINE

## 2022-11-20 PROCEDURE — 80053 COMPREHEN METABOLIC PANEL: CPT | Performed by: INTERNAL MEDICINE

## 2022-11-20 PROCEDURE — 250N000013 HC RX MED GY IP 250 OP 250 PS 637: Performed by: HOSPITALIST

## 2022-11-20 PROCEDURE — 258N000001 HC RX 258: Performed by: INTERNAL MEDICINE

## 2022-11-20 PROCEDURE — 83735 ASSAY OF MAGNESIUM: CPT | Performed by: STUDENT IN AN ORGANIZED HEALTH CARE EDUCATION/TRAINING PROGRAM

## 2022-11-20 RX ADMIN — FAMOTIDINE 20 MG: 10 INJECTION INTRAVENOUS at 17:23

## 2022-11-20 RX ADMIN — Medication 10 MG: at 22:18

## 2022-11-20 RX ADMIN — POTASSIUM CHLORIDE, DEXTROSE MONOHYDRATE AND SODIUM CHLORIDE: 150; 5; 900 INJECTION, SOLUTION INTRAVENOUS at 04:06

## 2022-11-20 RX ADMIN — TAZOBACTAM SODIUM AND PIPERACILLIN SODIUM 4.5 G: 500; 4 INJECTION, SOLUTION INTRAVENOUS at 11:13

## 2022-11-20 RX ADMIN — FAMOTIDINE 20 MG: 10 INJECTION INTRAVENOUS at 04:06

## 2022-11-20 RX ADMIN — TAZOBACTAM SODIUM AND PIPERACILLIN SODIUM 4.5 G: 500; 4 INJECTION, SOLUTION INTRAVENOUS at 04:08

## 2022-11-20 RX ADMIN — TAZOBACTAM SODIUM AND PIPERACILLIN SODIUM 4.5 G: 500; 4 INJECTION, SOLUTION INTRAVENOUS at 17:19

## 2022-11-20 RX ADMIN — TAZOBACTAM SODIUM AND PIPERACILLIN SODIUM 4.5 G: 500; 4 INJECTION, SOLUTION INTRAVENOUS at 22:18

## 2022-11-20 ASSESSMENT — ACTIVITIES OF DAILY LIVING (ADL)
ADLS_ACUITY_SCORE: 18

## 2022-11-20 NOTE — PROGRESS NOTES
Colon and Rectal Surgery Progress Note          Assessment and Plan:     33 yo M with acute diverticulitis    Clinically unchanged from yesterday.  Cont IV antibiotics.  Advance to full liquids.      Wilmer Monroy MD FACS FASCRS  Colorectal Surgeon       [facility]  [address], [address]  [place] [address]  T: [phone]  F: [phone]  Pager: [phone]  [email or web address]                 Interval History:     Pain improved.  Passing gas.              Physical Exam:   Vitals were reviewed  Patient Vitals for the past 24 hrs:   BP Temp Temp src Pulse Resp SpO2   11/20/22 0756 121/85 98.3  F (36.8  C) Oral 60 20 95 %   11/20/22 0415 115/84 97.7  F (36.5  C) Oral 54 16 99 %   11/20/22 0014 136/89 98.6  F (37  C) Oral 55 18 97 %   11/19/22 2044 119/77 -- -- 57 -- 96 %   11/19/22 2014 (!) 162/102 98.6  F (37  C) Oral 55 18 96 %   11/19/22 1518 (!) 142/98 98.6  F (37  C) Oral 68 18 96 %         Intake/Output Summary (Last 24 hours) at 11/20/2022 1247  Last data filed at 11/20/2022 0406  Gross per 24 hour   Intake 2916 ml   Output --   Net 2916 ml       Head - Nomocephalic atraumatic  Sclera anicteric  Extremities warm and well perfused  Lungs - breathing non labored,   Abdomen - soft. Flat, minimally tender  Rectal exam - deferred  Skin - no rash  Psych - affect appropriate  Neuro - no focal deficits             Data:   All laboratory and imaging data in the past 24 hours reviewed    CBC  Lab Results   Component Value Date    WBC 8.4 11/20/2022    WBC 8.5 11/19/2022    WBC 18.0 (H) 11/18/2022    HGB 13.5 11/20/2022    HGB 13.4 11/19/2022    HGB 12.4 (L) 11/18/2022    HCT 41.7 11/20/2022    HCT 40.8 11/19/2022    HCT 38.3 (L) 11/18/2022     11/20/2022     (H) 11/19/2022     11/18/2022       BMP  Recent Labs   Lab Test 11/20/22  0508 11/19/22  1240 11/18/22  0649     --  137   POTASSIUM 4.1 4.2 3.9   CHLORIDE 102  --  101   CO2 28  --  26   ANIONGAP 10  --  10    GLC 98  --  110*   BUN 4.7*  --  5.8*   CR 1.09  --  0.89   HERI 9.0  --  8.9       Liver Function Studies -   Recent Labs   Lab Test 11/20/22  0508   PROTTOTAL 6.8   ALBUMIN 3.5   BILITOTAL 1.0   ALKPHOS 189*   AST 26   ALT 48                      Medications:     Current Facility-Administered Medications Ordered in Epic   Medication Dose Route Frequency Last Rate Last Admin     acetaminophen (TYLENOL) tablet 650 mg  650 mg Oral Q6H PRN   650 mg at 11/17/22 2343     famotidine (PEPCID) injection 20 mg  20 mg Intravenous Q12H   20 mg at 11/20/22 0406     lidocaine (LMX4) cream   Topical Q1H PRN         lidocaine 1 % 0.1-1 mL  0.1-1 mL Other Q1H PRN         morphine (PF) injection 4 mg  4 mg Intravenous Q4H PRN   4 mg at 11/19/22 1941     naloxone (NARCAN) injection 0.2 mg  0.2 mg Intravenous Q2 Min PRN        Or     naloxone (NARCAN) injection 0.4 mg  0.4 mg Intravenous Q2 Min PRN        Or     naloxone (NARCAN) injection 0.2 mg  0.2 mg Intramuscular Q2 Min PRN        Or     naloxone (NARCAN) injection 0.4 mg  0.4 mg Intramuscular Q2 Min PRN         ondansetron (ZOFRAN ODT) ODT tab 4 mg  4 mg Oral Q6H PRN        Or     ondansetron (ZOFRAN) injection 4 mg  4 mg Intravenous Q6H PRN         oxyCODONE (ROXICODONE) tablet 5-10 mg  5-10 mg Oral Q4H PRN   5 mg at 11/19/22 0918     piperacillin-tazobactam (ZOSYN) intermittent infusion 4.5 g  4.5 g Intravenous Q6H 200 mL/hr at 11/20/22 1113 4.5 g at 11/20/22 1113     prochlorperazine (COMPAZINE) injection 10 mg  10 mg Intravenous Q6H PRN        Or     prochlorperazine (COMPAZINE) tablet 10 mg  10 mg Oral Q6H PRN        Or     prochlorperazine (COMPAZINE) suppository 25 mg  25 mg Rectal Q12H PRN         sennosides (SENOKOT) tablet 1 tablet  1 tablet Oral BID PRN   1 tablet at 11/18/22 0459     sodium chloride (PF) 0.9% PF flush 3 mL  3 mL Intracatheter Q8H   3 mL at 11/18/22 0916     sodium chloride (PF) 0.9% PF flush 3 mL  3 mL Intracatheter q1 min prn         No current  [facility]-ordered outpatient medications on file.

## 2022-11-20 NOTE — PROGRESS NOTES
[facility]  Hospitalist Progress Note  John Rodriguez M.D., M.B.A.   11/20/2022    Reason for Stay/active problem list      Acute sigmoid diverticulitis with contained perforation and sepsis         Assessment and Plan:        Summary of Stay: Tony Bonilla is a 32 year old male who was recently hospitalized 10/31/2022 - 11/3/2022 for Sepsis due to Acute Perforated Diverticulitis who presents to the ED with abdominal pain.      Problem List with Assessment and Plan:       Sepsis 2/2 Acute Diverticulitis with possible phlegmon versus abscess - pt presents with three days of LLQ abdominal pain, one day of  emesis and two days of non bloody diarrhea.  Pt. recently completed a course of antibiotics on 11/13 for Diverticulitis.   -- Patient was febrile on admission with temp of 101.2, wbc 25.6 and CT abd/pelvis revealed ongoing sigmoid diverticulitis an concern for a developing abscess and contained perforation.  --Patient was admitted and was treated with bowel rest, IV pain medications, IV fluids, IV antibiotic and colorectal surgery was consulted.  -- Tony continues to do well.  His pain is much better.  No nausea or vomiting.  No fever.  His WBC count is normalized and no positive cultures.  Plan is to continue IV antibiotic, pain medication, advance diet to full liquid diet and possibly to low fiber diet in the morning for possible discharge later tomorrow if okay with colorectal surgery team      Elevated total bilirubin: Serum bilirubin total of 2.8 on admission.  Etiology unclear, CT of the abdomen showed hepatic steatosis.    -- Currently his LFTs are normal except for alk phos of 189, monitor for now.      Plan for today:    Advance to full code diet today and continue current care plan.     May not need IV fluids if he is able to take enough orally      VTE Prophylaxis: Pneumatic Compression Devices  Code Status: Full Code  Diet: Clear Liquid Diet    Zavala Catheter: Not  "present    Family updated today: No     Disposition: Anticipate discharge tomorrow afternoon pending tolerance of low fiber diet and continues improvement of her symptoms        Interval History (Subjective):        Patient is seen and examined by me today and medical record reviewed.Overnight events noted and care discussed with nursing staff.  Continues to do well.  No fevers or chills.  No nausea or vomiting.  He is tolerating clear liquid diet.  He would like to try full liquid.          Physical Exam:        Last Vital Signs:  /85 (BP Location: Left arm)   Pulse 60   Temp 98.3  F (36.8  C) (Oral)   Resp 20   Ht 1.765 m (5' 9.5\")   Wt 105.6 kg (232 lb 12.8 oz)   SpO2 95%   BMI 33.89 kg/m      I/O last 3 completed shifts:  In: 2916 [I.V.:2916]  Out: -     Wt Readings from Last 5 Encounters:   11/17/22 105.6 kg (232 lb 12.8 oz)   11/16/22 105 kg (231 lb 6.4 oz)   11/10/22 107.2 kg (236 lb 6.4 oz)   11/01/22 108.5 kg (239 lb 3.2 oz)   10/31/22 110.2 kg (243 lb)        Constitutional: Awake, alert, cooperative, no apparent distress     Respiratory: Clear to auscultation bilaterally, no crackles or wheezing   Cardiovascular: Regular rate and rhythm, normal S1 and S2, and no murmur noted   Abdomen:  abdomen is soft, tenderness noted in the lower abdomen.  No rebound tenderness guarding or rigidity.  Bowel sounds normoactive   Skin: No new rashes, no cyanosis, dry to touch   Neuro: Alert with  no new focal weakness   Extremities: No edema   Other(s):        All other systems: Negative          Medications:        All current medications were reviewed with changes reflected in problem list.         Data:      All new lab and imaging data was reviewed.      Data reviewed today: I reviewed all new labs and imaging results over the last 24 hours. I personally reviewed       Recent Labs   Lab 11/20/22  0508 11/19/22  1202 11/18/22  0649   WBC 8.4 8.5 18.0*   HGB 13.5 13.4 12.4*   HCT 41.7 40.8 38.3*   MCV 99 99 " 100    467* 392     No results for input(s): CULT in the last 168 hours.  Recent Labs   Lab 11/20/22  0508 11/19/22  1240 11/18/22  0649 11/17/22  0817     --  137 138   POTASSIUM 4.1 4.2 3.9 4.0   CHLORIDE 102  --  101 102   CO2 28  --  26 24   ANIONGAP 10  --  10 12   GLC 98  --  110* 91   BUN 4.7*  --  5.8* 9.4   CR 1.09  --  0.89 1.01   GFRESTIMATED >90  --  >90 >90   HERI 9.0  --  8.9 8.6   MAG 2.2 2.2 2.2  --    PROTTOTAL 6.8  --  6.3* 6.2*   ALBUMIN 3.5  --  3.2* 3.3*   BILITOTAL 1.0  --  2.8* 2.8*   ALKPHOS 189*  --  141* 96   AST 26  --  20 21   ALT 48  --  36 38       Recent Labs   Lab 11/20/22  0508 11/18/22  0649 11/17/22  0817 11/16/22  1634   GLC 98 110* 91 96       No results for input(s): INR in the last 168 hours.      No results for input(s): TROPONIN, TROPI, TROPR in the last 168 hours.    Invalid input(s): TROP, TROPONINIES    Recent Results (from the past 48 hour(s))   CT Abdomen Pelvis w Contrast    Narrative    CT ABDOMEN/PELVIS WITH CONTRAST November 16, 2022 12:47 PM    CLINICAL HISTORY: Left lower quadrant pain.    TECHNIQUE: CT scan of the abdomen and pelvis was performed following  injection of IV contrast. Multiplanar reformats were obtained. Dose  reduction techniques were used.  CONTRAST: 100mL Isovue 370.    COMPARISON: CT of the abdomen and pelvis performed 10/31/2022.    FINDINGS:   LOWER CHEST: The visualized lung bases are clear.    HEPATOBILIARY: Hepatic steatosis. No hepatic masses are seen.    PANCREAS: Normal.    SPLEEN: Normal.    ADRENAL GLANDS: Normal.    KIDNEYS/BLADDER: Unremarkable. No hydronephrosis.    BOWEL: Scattered colonic diverticulosis. Prominent bowel wall  thickening with surrounding inflammatory stranding in the sigmoid  colon, similar to the previous exam, and consistent with acute  diverticulitis. An area of ill-defined hypodensity adjacent to the  thickened sigmoid colon in the region of the previously described  contained perforation measures  6.2 x 4.7 cm (2/168), suspicious for  phlegmonous inflammation. No associated rim enhancement is identified.  There are a few small adjacent extraluminal air bubbles (2/149). No  bowel obstruction. Unremarkable appendix.    PELVIC ORGANS: Unremarkable.    LYMPH NODES: No enlarged lymph nodes are identified in the abdomen or  pelvis.    VASCULATURE: Unremarkable.    ADDITIONAL FINDINGS: None.    MUSCULOSKELETAL: Unremarkable.      Impression    IMPRESSION:   1.  Previously described changes of acute sigmoid diverticulitis are  again noted.  2.  In the region of the previously noted contained perforation  adjacent to the sigmoid colon, there is a 6.2 cm area of associated  phlegmonous inflammatory change. A developing abscess in this location  cannot be excluded. Clinical correlation and close follow-up are  recommended.  3.  Hepatic steatosis.    CALEB HALE MD         SYSTEM ID:  [id number]       COVID Status:  COVID-19 PCR Results    COVID-19 PCR Results 10/31/22 11/16/22   SARS CoV2 PCR Negative Negative      Comments are available for some flowsheets but are not being displayed.         COVID-19 Antibody Results, Testing for Immunity    COVID-19 Antibody Results, Testing for Immunity   No data to display.              Disclaimer: This note consists of symbols derived from keyboarding, dictation and/or voice recognition software. As a result, there may be errors in the script that have gone undetected. Please consider this when interpreting information found in this chart.

## 2022-11-20 NOTE — PLAN OF CARE
Goal Outcome Evaluation:       To Do:  End of Shift Summary  For vital signs and complete assessments, please see documentation flowsheets.     Pertinent assessments: VSS. Denies nausea. Morphine given for abdo pain. Up ind.  Major Shift Events: uneventful  Treatment Plan: Zosyn, IVF, symptom management and diet advancement.  Bedside Nurse: Savannah Gilbert RN

## 2022-11-21 LAB
BACTERIA BLD CULT: NO GROWTH
BACTERIA BLD CULT: NO GROWTH
MAGNESIUM SERPL-MCNC: 2.3 MG/DL (ref 1.7–2.3)
POTASSIUM SERPL-SCNC: 4.6 MMOL/L (ref 3.4–5.3)

## 2022-11-21 PROCEDURE — 250N000013 HC RX MED GY IP 250 OP 250 PS 637: Performed by: PHYSICIAN ASSISTANT

## 2022-11-21 PROCEDURE — 83735 ASSAY OF MAGNESIUM: CPT | Performed by: INTERNAL MEDICINE

## 2022-11-21 PROCEDURE — 99232 SBSQ HOSP IP/OBS MODERATE 35: CPT | Performed by: INTERNAL MEDICINE

## 2022-11-21 PROCEDURE — 250N000011 HC RX IP 250 OP 636: Performed by: PHYSICIAN ASSISTANT

## 2022-11-21 PROCEDURE — 84132 ASSAY OF SERUM POTASSIUM: CPT | Performed by: INTERNAL MEDICINE

## 2022-11-21 PROCEDURE — 250N000013 HC RX MED GY IP 250 OP 250 PS 637: Performed by: INTERNAL MEDICINE

## 2022-11-21 PROCEDURE — 250N000013 HC RX MED GY IP 250 OP 250 PS 637: Performed by: HOSPITALIST

## 2022-11-21 PROCEDURE — 36415 COLL VENOUS BLD VENIPUNCTURE: CPT | Performed by: INTERNAL MEDICINE

## 2022-11-21 PROCEDURE — 120N000001 HC R&B MED SURG/OB

## 2022-11-21 RX ADMIN — AMOXICILLIN AND CLAVULANATE POTASSIUM 1 TABLET: 875; 125 TABLET, FILM COATED ORAL at 20:26

## 2022-11-21 RX ADMIN — ACETAMINOPHEN 650 MG: 325 TABLET, FILM COATED ORAL at 10:33

## 2022-11-21 RX ADMIN — TAZOBACTAM SODIUM AND PIPERACILLIN SODIUM 4.5 G: 500; 4 INJECTION, SOLUTION INTRAVENOUS at 10:29

## 2022-11-21 RX ADMIN — FAMOTIDINE 20 MG: 10 INJECTION INTRAVENOUS at 16:18

## 2022-11-21 RX ADMIN — FAMOTIDINE 20 MG: 10 INJECTION INTRAVENOUS at 05:34

## 2022-11-21 RX ADMIN — Medication 10 MG: at 20:26

## 2022-11-21 RX ADMIN — TAZOBACTAM SODIUM AND PIPERACILLIN SODIUM 4.5 G: 500; 4 INJECTION, SOLUTION INTRAVENOUS at 05:45

## 2022-11-21 ASSESSMENT — ACTIVITIES OF DAILY LIVING (ADL)
ADLS_ACUITY_SCORE: 18

## 2022-11-21 NOTE — PROGRESS NOTES
Colon and Rectal Surgery Progress Note          Assessment and Plan:     31 yo M with acute diverticulitis    Clinically feeeling well. Eating low fiber diet.  No pain. On Day #5 IV abx.       Recommend:  -Transition to PO Antibiotics for 2 week total course  -Would keep patient 1 more day on PO antbiotics to ensure clinical resolution before discharge  -colonoscopy in 6-8 weeks from admission        Juan Conn MD, MPH  Fellow in Colon and Rectal Surgery  [facility]          [facility]  [address], [address]  [place] [address]  T: [phone]  F: [phone]  Pager: [phone]  [email or web address]                 Interval History:     Pain improved.  Passing gas and stool. No diarrhea              Physical Exam:   Vitals were reviewed  Patient Vitals for the past 24 hrs:   BP Temp Temp src Pulse Resp SpO2   11/21/22 0332 119/84 97.7  F (36.5  C) Oral 76 18 100 %   11/20/22 2332 130/74 98.4  F (36.9  C) Oral 64 18 98 %   11/20/22 1925 126/74 98.7  F (37.1  C) Oral 71 18 96 %   11/20/22 1546 124/84 98.5  F (36.9  C) Oral 76 16 97 %   11/20/22 0756 121/85 98.3  F (36.8  C) Oral 60 20 95 %         Intake/Output Summary (Last 24 hours) at 11/20/2022 1247  Last data filed at 11/20/2022 0406  Gross per 24 hour   Intake 2916 ml   Output --   Net 2916 ml       Head - Nomocephalic atraumatic  Sclera anicteric  Extremities warm and well perfused  Lungs - breathing non labored,   Abdomen - soft. Flat, minimally tender  Rectal exam - deferred  Skin - no rash  Psych - affect appropriate  Neuro - no focal deficits             Data:   All laboratory and imaging data in the past 24 hours reviewed    CBC  Lab Results   Component Value Date    WBC 8.4 11/20/2022    WBC 8.5 11/19/2022    WBC 18.0 (H) 11/18/2022    HGB 13.5 11/20/2022    HGB 13.4 11/19/2022    HGB 12.4 (L) 11/18/2022    HCT 41.7 11/20/2022    HCT 40.8 11/19/2022    HCT 38.3 (L) 11/18/2022     11/20/2022      (H) 11/19/2022     11/18/2022       BMP  Recent Labs   Lab Test 11/20/22  0508 11/19/22  1240 11/18/22  0649     --  137   POTASSIUM 4.1 4.2 3.9   CHLORIDE 102  --  101   CO2 28  --  26   ANIONGAP 10  --  10   GLC 98  --  110*   BUN 4.7*  --  5.8*   CR 1.09  --  0.89   HERI 9.0  --  8.9       Liver Function Studies -   Recent Labs   Lab Test 11/20/22  0508   PROTTOTAL 6.8   ALBUMIN 3.5   BILITOTAL 1.0   ALKPHOS 189*   AST 26   ALT 48                      Medications:     Current Facility-Administered Medications Ordered in Epic   Medication Dose Route Frequency Last Rate Last Admin     acetaminophen (TYLENOL) tablet 650 mg  650 mg Oral Q6H PRN   650 mg at 11/17/22 2343     famotidine (PEPCID) injection 20 mg  20 mg Intravenous Q12H   20 mg at 11/21/22 0534     lidocaine (LMX4) cream   Topical Q1H PRN         lidocaine 1 % 0.1-1 mL  0.1-1 mL Other Q1H PRN         melatonin tablet 10 mg  10 mg Oral At Bedtime PRN   10 mg at 11/20/22 2218     morphine (PF) injection 4 mg  4 mg Intravenous Q4H PRN   4 mg at 11/19/22 1941     naloxone (NARCAN) injection 0.2 mg  0.2 mg Intravenous Q2 Min PRN        Or     naloxone (NARCAN) injection 0.4 mg  0.4 mg Intravenous Q2 Min PRN        Or     naloxone (NARCAN) injection 0.2 mg  0.2 mg Intramuscular Q2 Min PRN        Or     naloxone (NARCAN) injection 0.4 mg  0.4 mg Intramuscular Q2 Min PRN         ondansetron (ZOFRAN ODT) ODT tab 4 mg  4 mg Oral Q6H PRN        Or     ondansetron (ZOFRAN) injection 4 mg  4 mg Intravenous Q6H PRN         oxyCODONE (ROXICODONE) tablet 5-10 mg  5-10 mg Oral Q4H PRN   5 mg at 11/19/22 0918     piperacillin-tazobactam (ZOSYN) intermittent infusion 4.5 g  4.5 g Intravenous Q6H 200 mL/hr at 11/21/22 0545 4.5 g at 11/21/22 0545     prochlorperazine (COMPAZINE) injection 10 mg  10 mg Intravenous Q6H PRN        Or     prochlorperazine (COMPAZINE) tablet 10 mg  10 mg Oral Q6H PRN        Or     prochlorperazine (COMPAZINE) suppository 25 mg   25 mg Rectal Q12H PRN         sennosides (SENOKOT) tablet 1 tablet  1 tablet Oral BID PRN   1 tablet at 11/18/22 0459     sodium chloride (PF) 0.9% PF flush 3 mL  3 mL Intracatheter Q8H   3 mL at 11/21/22 0106     sodium chloride (PF) 0.9% PF flush 3 mL  3 mL Intracatheter q1 min prn         No current [facility]-ordered outpatient medications on file.

## 2022-11-21 NOTE — PROGRESS NOTES
[facility]    Medicine Progress Note - Hospitalist Service    Date of Admission:  11/16/2022            Reason for Stay/active problem list       Acute sigmoid diverticulitis with contained perforation and sepsis          Assessment and Plan:         Summary of Stay: Tony Bonilla is a 32 year old male who was recently hospitalized 10/31/2022 - 11/3/2022 for Sepsis due to Acute Perforated Diverticulitis who presents to the ED with abdominal pain.        Problem List with Assessment and Plan:        Sepsis 2/2 Acute Diverticulitis with contained perforation with possible phlegmon versus abscess - pt presents with three days of LLQ abdominal pain, one day of  emesis and two days of non bloody diarrhea.  Pt. recently completed a course of antibiotics on 11/13 for Diverticulitis.   -- Patient was febrile on admission with temp of 101.2, wbc 25.6 and CT abd/pelvis revealed ongoing sigmoid diverticulitis an concern for a developing abscess and contained perforation.  --Patient was admitted and was treated with bowel rest, IV pain medications, IV fluids, IV antibiotic and colorectal surgery was consulted.  -- Tony continues to do well.  His pain is much better.  No nausea or vomiting.  No fever.  His WBC count is normalized and no positive cultures.    -Appreciate input from colorectal service with plans to switch him to oral antibiotics and monitor him at least for another day in the hospital prior to discharge         Elevated total bilirubin: Serum bilirubin total of 2.8 on admission.  Etiology unclear, CT of the abdomen showed hepatic steatosis.    -- Currently his LFTs are normal except for alk phos of 189, monitor for now.        Plan for today:  -Switch to oral antibiotics           Diet: Low Fiber Diet    DVT Prophylaxis: Pneumatic Compression Devices and Ambulate every shift  Zavala Catheter: Not present  Central Lines: None  Cardiac Monitoring: None  Code Status: Full Code   "    Disposition Plan     Expected Discharge Date: Anticipating the next 24 hours              The patient's care was discussed with the Bedside Nurse and Patient.    Nav Rojas MD, MD  Hospitalist Service  [facility]  Securely message with the Vocera Web Console (learn more here)  Text page via [employer] Paging/Directory         Clinically Significant Risk Factors              # Hypoalbuminemia: Lowest albumin = 3.2 g/dL (Ref range: 3.5-5.2) at 11/18/2022  6:49 AM, will monitor as appropriate          # Obesity: Estimated body mass index is 33.89 kg/m  as calculated from the following:    Height as of this encounter: 1.765 m (5' 9.5\").    Weight as of this encounter: 105.6 kg (232 lb 12.8 oz).          ______________________________________________________________________    Interval History     I assume service care today.  Seen and examined.  Chart reviewed.  Case discussed with nursing service.  I met this gentleman this morning while he is laying comfortably in bed.  He denies any ongoing nausea, vomiting.  No reported abdominal pain.  Tolerating oral diet.  No diarrhea.  Febrile.      Data reviewed today: I reviewed all medications, new labs and imaging results over the last 24 hours. I personally reviewed .    Physical Exam   Vital Signs: Temp: 98.4  F (36.9  C) Temp src: Oral BP: 124/80 Pulse: 72   Resp: 16 SpO2: 95 % O2 Device: None (Room air)    Weight: 232 lbs 12.8 oz  HEENT; Atraumatic, normocephalic, pinkish conjuctiva, pupils bilateral reactive   Skin: warm and moist, no rashes  Lungs: equal chest expansion, clear to auscultation, no wheezes, no stridor, no crackles,   Heart: normal rate, normal rhythm, no rubs or gallops.   Abdomen: normal bowel sounds, no tenderness, no peritoneal signs, no guarding  Extremities: no deformities, no edema   Neuro; follow commands, alert and oriented x3, spontaneous speech, coherent, moves all extremities spontaneously  Psych; no " hallucination, euthymic mood, not agitated        Data   Recent Labs   Lab 11/21/22  0757 11/20/22  0508 11/19/22  1240 11/19/22  1202 11/18/22  0649 11/17/22  0817   WBC  --  8.4  --  8.5 18.0* 22.6*   HGB  --  13.5  --  13.4 12.4* 12.6*   MCV  --  99  --  99 100 99   PLT  --  450  --  467* 392 429   NA  --  140  --   --  137 138   POTASSIUM 4.6 4.1 4.2  --  3.9 4.0   CHLORIDE  --  102  --   --  101 102   CO2  --  28  --   --  26 24   BUN  --  4.7*  --   --  5.8* 9.4   CR  --  1.09  --   --  0.89 1.01   ANIONGAP  --  10  --   --  10 12   HERI  --  9.0  --   --  8.9 8.6   GLC  --  98  --   --  110* 91   ALBUMIN  --  3.5  --   --  3.2* 3.3*   PROTTOTAL  --  6.8  --   --  6.3* 6.2*   BILITOTAL  --  1.0  --   --  2.8* 2.8*   ALKPHOS  --  189*  --   --  141* 96   ALT  --  48  --   --  36 38   AST  --  26  --   --  20 21     No results found for this or any previous visit (from the past 24 hour(s)).  Medications       famotidine  20 mg Intravenous Q12H     piperacillin-tazobactam  4.5 g Intravenous Q6H     sodium chloride (PF)  3 mL Intracatheter Q8H

## 2022-11-21 NOTE — CARE PLAN
Pt is A&O x4. VSS. On RA. Denies pain. IV SL. Ambulates independently in the room. Voids in the bathroom. On potassium and magnesium protocols. Sleeps between cares. Possible discharge to home on 11/21.

## 2022-11-21 NOTE — PLAN OF CARE
End of Shift Summary  For vital signs and complete assessments, please see documentation flowsheets.     Pertinent assessments: VSS. Patient pain is improved today. Up independent.  Major Shift Events: diet advanced to low fiber, tolerating well.  Treatment Plan: Zosyn, possible discharge 11/21    Bedside Nurse: Cheri Walls RN

## 2022-11-21 NOTE — PLAN OF CARE
A&Ox4. Up independent, ambulated in halls. Denies abd pain, c/o of back pain from hospital bed, Tylenol given, decrease in pain reported. Reported loose/watery stool today. No nausea. Tolerating low fiber diet. Transitioned to PO Augmentin. Possible discharge tomorrow after morning scan.

## 2022-11-22 ENCOUNTER — APPOINTMENT (OUTPATIENT)
Dept: CT IMAGING | Facility: CLINIC | Age: 32
End: 2022-11-22
Attending: COLON & RECTAL SURGERY
Payer: COMMERCIAL

## 2022-11-22 ENCOUNTER — TELEPHONE (OUTPATIENT)
Dept: PEDIATRICS | Facility: CLINIC | Age: 32
End: 2022-11-22

## 2022-11-22 VITALS
RESPIRATION RATE: 16 BRPM | BODY MASS INDEX: 33.33 KG/M2 | WEIGHT: 232.8 LBS | OXYGEN SATURATION: 99 % | HEART RATE: 58 BPM | TEMPERATURE: 97.8 F | DIASTOLIC BLOOD PRESSURE: 83 MMHG | HEIGHT: 70 IN | SYSTOLIC BLOOD PRESSURE: 125 MMHG

## 2022-11-22 LAB
MAGNESIUM SERPL-MCNC: 2.3 MG/DL (ref 1.7–2.3)
POTASSIUM SERPL-SCNC: 4.3 MMOL/L (ref 3.4–5.3)

## 2022-11-22 PROCEDURE — 36415 COLL VENOUS BLD VENIPUNCTURE: CPT | Performed by: INTERNAL MEDICINE

## 2022-11-22 PROCEDURE — 250N000011 HC RX IP 250 OP 636: Performed by: STUDENT IN AN ORGANIZED HEALTH CARE EDUCATION/TRAINING PROGRAM

## 2022-11-22 PROCEDURE — 250N000013 HC RX MED GY IP 250 OP 250 PS 637: Performed by: INTERNAL MEDICINE

## 2022-11-22 PROCEDURE — 83735 ASSAY OF MAGNESIUM: CPT | Performed by: INTERNAL MEDICINE

## 2022-11-22 PROCEDURE — 250N000011 HC RX IP 250 OP 636: Performed by: PHYSICIAN ASSISTANT

## 2022-11-22 PROCEDURE — 99239 HOSP IP/OBS DSCHRG MGMT >30: CPT | Performed by: INTERNAL MEDICINE

## 2022-11-22 PROCEDURE — 74177 CT ABD & PELVIS W/CONTRAST: CPT

## 2022-11-22 PROCEDURE — 258N000003 HC RX IP 258 OP 636: Performed by: STUDENT IN AN ORGANIZED HEALTH CARE EDUCATION/TRAINING PROGRAM

## 2022-11-22 PROCEDURE — 84132 ASSAY OF SERUM POTASSIUM: CPT | Performed by: INTERNAL MEDICINE

## 2022-11-22 RX ORDER — IOPAMIDOL 755 MG/ML
500 INJECTION, SOLUTION INTRAVASCULAR ONCE
Status: COMPLETED | OUTPATIENT
Start: 2022-11-22 | End: 2022-11-22

## 2022-11-22 RX ORDER — ONDANSETRON 4 MG/1
4 TABLET, ORALLY DISINTEGRATING ORAL EVERY 6 HOURS PRN
Qty: 20 TABLET | Refills: 0 | Status: SHIPPED | OUTPATIENT
Start: 2022-11-22 | End: 2023-02-10

## 2022-11-22 RX ADMIN — IOPAMIDOL 89 ML: 755 INJECTION, SOLUTION INTRAVENOUS at 08:10

## 2022-11-22 RX ADMIN — AMOXICILLIN AND CLAVULANATE POTASSIUM 1 TABLET: 875; 125 TABLET, FILM COATED ORAL at 09:08

## 2022-11-22 RX ADMIN — SODIUM CHLORIDE 61 ML: 9 INJECTION, SOLUTION INTRAVENOUS at 08:10

## 2022-11-22 RX ADMIN — FAMOTIDINE 20 MG: 10 INJECTION INTRAVENOUS at 05:08

## 2022-11-22 ASSESSMENT — ACTIVITIES OF DAILY LIVING (ADL)
ADLS_ACUITY_SCORE: 18

## 2022-11-22 NOTE — PROGRESS NOTES
Care Management Discharge Note    Discharge Date: 11/22/2022       Discharge Disposition: Home    Handoff Referral Completed: Yes    Additional Information:  Pt identified as a Service Bundle #4. No needs or assessment needed at this time. Please consult CM/SW  if discharge needs should arise.        YANET Joel, [facility]  Inpatient Care Coordination  [facility]  [phone]

## 2022-11-22 NOTE — DISCHARGE SUMMARY
[facility]  Hospitalist Discharge Summary      Date of Admission:  11/16/2022  Date of Discharge:  11/22/2022  Discharging Provider: Nav Rojas MD, MD  Discharge Service: Hospitalist Service    Discharge Diagnoses   Sepsis secondary to acute diverticulitis with contained perforation  Acute diverticulitis with phlegmon in left pelvis  History of alcohol abuse    Follow-ups Needed After Discharge   Follow-up Appointments     Follow-up and recommended labs and tests       Follow up with primary care provider, Kathy Caldera, within 7   days to evaluate medication change, to evaluate treatment change, and for   hospital follow- up.    You will need to follow-up with colorectal service as scheduled             Unresulted Labs Ordered in the Past 30 Days of this Admission     No orders found from 10/17/2022 to 11/17/2022.          Discharge Disposition   Discharged to home  Condition at discharge: Stable      Hospital Course      Continuing service care today.  No significant reported events overnight.  Continues to demonstrate tolerance to advancement of diet on low fiber diet.  Remain afebrile.  Earlier leukocytosis has resolved.  No worsening clinical condition even after switching to oral antibiotics yesterday with plans to continue 14-day course total.  We will be sending home on oral Augmentin.  He is not requiring any narcotics for pain control.  No narcotics upon discharge.  As needed APAP.  Optimized and cleared from colorectal service.  He needs to follow-up closely with colorectal service with plans to pursue colonoscopy as outpatient.  CT of the abdomen and pelvis as ordered by colorectal service did show decrease inflammation at region of perisigmoid phlegmonous, and earlier associated bowel thickening the sigmoid colon was also improved.  Tony is hopeful and requesting for home discharge if feasible and will proceed with that today.  He also mentioned that he has not  been drinking at least for the last 3 weeks.  Discussed with him importance of complete EtOH cessation if desired and feasible.    I will refer you to excerpts of prior progress notes as listed below for further details of hospital stay    Reason for Stay/active problem list       Acute sigmoid diverticulitis with contained perforation and sepsis          Assessment and Plan:         Summary of Stay: Tony Bonilla is a 32 year old male who was recently hospitalized 10/31/2022 - 11/3/2022 for Sepsis due to Acute Perforated Diverticulitis who presents to the ED with abdominal pain.        Problem List with Assessment and Plan:        Sepsis 2/2 Acute Diverticulitis with contained perforation with possible phlegmon versus abscess - pt presents with three days of LLQ abdominal pain, one day of  emesis and two days of non bloody diarrhea.  Pt. recently completed a course of antibiotics on 11/13 for Diverticulitis.   -- Patient was febrile on admission with temp of 101.2, wbc 25.6 and CT abd/pelvis revealed ongoing sigmoid diverticulitis an concern for a developing abscess and contained perforation.  --Patient was admitted and was treated with bowel rest, IV pain medications, IV fluids, IV antibiotic and colorectal surgery was consulted.  -- Tony continues to do well.  His pain is much better.  No nausea or vomiting.  No fever.  His WBC count is normalized and no positive cultures.    -Appreciate input from colorectal service with plans to switch him to oral antibiotics and monitor him at least for another day in the hospital prior to discharge         Elevated total bilirubin: Serum bilirubin total of 2.8 on admission.  Etiology unclear, CT of the abdomen showed hepatic steatosis.    -- Currently his LFTs are normal except for alk phos of 189, monitor for now.         Consultations This Hospital Stay   COLORECTAL SURGERY IP CONSULT  INTERVENTIONAL RADIOLOGY ADULT/PEDS IP CONSULT    Code Status   Full Code    Time  Spent on this Encounter   I, Nav Rojas MD, MD, personally saw the patient today and spent greater than 30 minutes discharging this patient.       Nav Rojas MD, MD  [place] [address] [facility]  [address]  [facility] [address]  Phone: [phone]  Fax: [phone]  ______________________________________________________________________    Physical Exam   Vital Signs: Temp: 97.8  F (36.6  C) Temp src: Oral BP: 125/83 Pulse: 58   Resp: 16 SpO2: 99 % O2 Device: None (Room air)    Weight: 232 lbs 12.8 oz  HEENT; Atraumatic, normocephalic, pinkish conjuctiva, pupils bilateral reactive   Skin: warm and moist, no rashes  Lymphatics: no cervical or axillary lymphandenopathy  Lungs: equal chest expansion, clear to auscultation, no wheezes, no stridor, no crackles,   Heart: normal rate, normal rhythm, no rubs or gallops.   Abdomen: normal bowel sounds, no tenderness, no peritoneal signs, no guarding  Extremities: no deformities, no edema   Neuro; follow commands, alert and oriented x3, spontaneous speech, coherent, moves all extremities spontaneously  Psych; no hallucination, euthymic mood, not agitated           Primary Care Physician   Kathy Caldera    Discharge Orders      Reason for your hospital stay    You are admitted in the hospital for abdominal pain, found with complicated sigmoid diverticulitis with contained microperforation, possible phlegmon and  abscess collection     Follow-up and recommended labs and tests     Follow up with primary care provider, Kathy Caldera, within 7 days to evaluate medication change, to evaluate treatment change, and for hospital follow- up.    You will need to follow-up with colorectal service as scheduled     Activity    Your activity upon discharge: activity as tolerated     Full Code     Diet    Follow this diet upon discharge: As per instructions by colorectal service       Significant Results and Procedures   Most  Recent 3 CBC's:Recent Labs   Lab Test 11/20/22  0508 11/19/22  1202 11/18/22  0649   WBC 8.4 8.5 18.0*   HGB 13.5 13.4 12.4*   MCV 99 99 100    467* 392     Most Recent 3 BMP's:Recent Labs   Lab Test 11/22/22  0752 11/21/22  0757 11/20/22  0508 11/19/22  1240 11/18/22  0649 11/17/22  0817   NA  --   --  140  --  137 138   POTASSIUM 4.3 4.6 4.1   < > 3.9 4.0   CHLORIDE  --   --  102  --  101 102   CO2  --   --  28  --  26 24   BUN  --   --  4.7*  --  5.8* 9.4   CR  --   --  1.09  --  0.89 1.01   ANIONGAP  --   --  10  --  10 12   HERI  --   --  9.0  --  8.9 8.6   GLC  --   --  98  --  110* 91    < > = values in this interval not displayed.     Most Recent 2 LFT's:Recent Labs   Lab Test 11/20/22  0508 11/18/22  0649   AST 26 20   ALT 48 36   ALKPHOS 189* 141*   BILITOTAL 1.0 2.8*     Most Recent 3 INR's:No lab results found.  7-Day Micro Results     Collected Updated Procedure Result Status      11/16/2022 2214 11/17/2022 1211 Enteric Bacteria and Virus Panel by DAKSHA Stool [[id number]]    Stool from Per Rectum    Final result Component Value   Campylobacter group Not Detected   Salmonella species Not Detected   Shigella species Not Detected   Vibrio group Not Detected   Rotavirus Not Detected   Shiga toxin 1 gene Not Detected   Shiga toxin 2 gene Not Detected   Norovirus I and II Not Detected   Yersinia enterocolitica Not Detected            11/16/2022 2214 11/17/2022 0617 C. difficile Toxin B PCR with reflex to C. difficile Antigen and Toxins A/B EIA [[id number]]    Stool from Per Rectum    Final result Component Value   C Difficile Toxin B by PCR Negative   A negative result does not exclude actual disease due to C. difficile and may be due to improper collection, handling and storage of the specimen or the number of organisms in the specimen is below the detection limit of the assay.            11/16/2022 1724 11/16/2022 1821 Asymptomatic COVID-19 Virus (Coronavirus) by PCR Nasopharyngeal [[id number]]     Swab from Nasopharyngeal    Final result Component Value   SARS CoV2 PCR Negative   NEGATIVE: SARS-CoV-2 (COVID-19) RNA not detected, presumed negative.            11/16/2022 1653 11/21/2022 2103 Blood Culture Arm, Left [[id number]]   Blood from Arm, Left    Final result Component Value   Culture No Growth               11/16/2022 1634 11/21/2022 2103 Blood Culture Peripheral Blood [[id number]]   Peripheral Blood    Final result Component Value   Culture No Growth                   Most Recent Hemoglobin A1c:No lab results found.  Most Recent 6 glucoses:Recent Labs   Lab Test 11/20/22  0508 11/18/22  0649 11/17/22  0817 11/16/22  1634 11/03/22  0655 11/02/22  0720   GLC 98 110* 91 96 90 93   ,   Results for orders placed or performed during the hospital encounter of 11/16/22   CT Abdomen Pelvis w Contrast    Narrative    EXAM: CT ABDOMEN PELVIS W CONTRAST  LOCATION: [facility]  DATE/TIME: 11/18/2022 6:42 PM    INDICATION: Recent CT scan with phlegmon , question IR drain  COMPARISON: CT abdomen/pelvis with contrast 11/16/2022  TECHNIQUE: CT scan of the abdomen and pelvis was performed following injection of IV contrast. Multiplanar reformats were obtained. Dose reduction techniques were used.  CONTRAST: 100mL Isovue 370    FINDINGS:   LOWER CHEST: Normal.    HEPATOBILIARY: Hepatic steatosis. Unremarkable gallbladder.    PANCREAS: Normal.    SPLEEN: Normal.    ADRENAL GLANDS: Normal.    KIDNEYS/BLADDER: No hydronephrosis. Diffusely thickened urinary bladder with pericystic stranding.    BOWEL: Essentially stable size of phlegmonous perisigmoid focus in the left hemipelvis with several foci of gas within, without evidence of rim enhancing drainable collection at this time. Sigmoid thickening and colonic diverticulosis again consistent   with history of diverticulitis.    LYMPH NODES: Normal.    VASCULATURE: Unremarkable.    PELVIC ORGANS: Normal.    MUSCULOSKELETAL: Normal.      Impression     IMPRESSION:   1.  Essentially stable size of phlegmonous perisigmoid focus in the left hemipelvis with several foci of gas within, without evidence of rim enhancing drainable collection at this time.  2.  Diffusely thickened urinary bladder with pericystic stranding, findings which may be seen with superimposed cystitis. Recommend correlation with urinalysis.   CT Abdomen Pelvis w Contrast    Narrative    CT ABDOMEN AND PELVIS WITH CONTRAST 11/22/2022 8:19 AM    CLINICAL HISTORY: Follow up phlegmon.    TECHNIQUE: CT scan of the abdomen and pelvis was performed following  injection of IV contrast. Multiplanar reformats were obtained. Dose  reduction techniques were used.    CONTRAST: 89mL Isovue-370    COMPARISON: CT of the abdomen and pelvis performed 11/18/2022.    FINDINGS:   LOWER CHEST: The visualized lung bases are clear.    HEPATOBILIARY: Unremarkable. No hepatic masses are seen.    PANCREAS: Normal.    SPLEEN: Normal.    ADRENAL GLANDS: Normal.    KIDNEYS/BLADDER: Mild diffuse bladder wall thickening is not  significantly changed, and could be related to lack of distention,  although cystitis could again possibly have this appearance. No  hydronephrosis.    BOWEL: Previously described region of phlegmonous change adjacent to  the sigmoid colon with mild surrounding inflammatory stranding (series  2 image 162) has decreased in size, measuring 2.8 x 2.3 cm (previously  6.2 x 4.2 cm). A few small extraluminal air bubbles are again noted in  this region of inflammatory change. No convincing associated rim  enhancement. Associated bowel wall thickening in the sigmoid colon has  also improved. No bowel obstruction. Scattered sigmoid diverticulosis.  Unremarkable appendix.    PELVIC ORGANS: Unremarkable.    LYMPH NODES: No enlarged lymph nodes are identified in the abdomen or  pelvis.    VASCULATURE: Unremarkable.    ADDITIONAL FINDINGS: None.    MUSCULOSKELETAL: Unremarkable.      Impression    IMPRESSION:   1.   Region of perisigmoid phlegmonous inflammation has decreased in  size compared to 11/18/2022.  2.  Associated bowel wall thickening in the sigmoid colon has also  improved.    CALEB HALE MD         SYSTEM ID:  [id number]       Discharge Medications   Current Discharge Medication List      START taking these medications    Details   amoxicillin-clavulanate (AUGMENTIN) 875-125 MG tablet Take 1 tablet by mouth every 12 hours for 8 days  Qty: 16 tablet, Refills: 0    Associated Diagnoses: Sigmoid diverticulitis; Perforation of sigmoid colon due to diverticulitis      ondansetron (ZOFRAN ODT) 4 MG ODT tab Take 1 tablet (4 mg) by mouth every 6 hours as needed for nausea or vomiting  Qty: 20 tablet, Refills: 0    Associated Diagnoses: Sigmoid diverticulitis; Perforation of sigmoid colon due to diverticulitis         CONTINUE these medications which have NOT CHANGED    Details   acetaminophen (TYLENOL) 500 MG tablet Take 1,000 mg by mouth 2 times daily as needed for mild pain      augmented betamethasone dipropionate (DIPROLENE-AF) 0.05 % external ointment Apply topically 2 times daily  Qty: 50 g, Refills: 11    Associated Diagnoses: Rash      mupirocin (BACTROBAN) 2 % external ointment Apply topically 3 times daily  Qty: 30 g, Refills: 11    Associated Diagnoses: Rash           Allergies   Allergies   Allergen Reactions     Cefaclor GI Disturbance     Ciprofloxacin Muscle Pain (Myalgia)     Achilles pain

## 2022-11-22 NOTE — PROGRESS NOTES
Colon and Rectal Surgery Progress Note          Assessment and Plan:     33 yo M with acute diverticulitis with phlegmon in left pelvis.    Clinically feeeling well. Eating low fiber diet.  No pain. Completed Day #5 IV abx and now on Day #2 of PO Augmentin BID      Recommend:  -CT a/p with IV contrast today to see if resolution of Phlegmon or progression to Abscess.  -Continue  PO Antibiotics for 2 week total course  -colonoscopy in 6-8 weeks from admission  -If feels well and no intervenable abscess on CT, likely discharge today.       Juan Conn MD, MPH  Fellow in Colon and Rectal Surgery  [facility]          [facility]  [address], [address]  [place] [address]  T: [phone]  F: [phone]  Pager: [phone]  [email or web address]                 Interval History:     Pain improved.  Passing gas and stool. No diarrhea              Physical Exam:   Vitals were reviewed  Patient Vitals for the past 24 hrs:   BP Temp Temp src Pulse Resp SpO2   11/22/22 0729 125/83 97.8  F (36.6  C) Oral 58 16 99 %   11/22/22 0508 122/72 97.8  F (36.6  C) Oral 55 14 98 %   11/22/22 0045 (!) 137/93 97.9  F (36.6  C) Oral 73 16 97 %   11/22/22 0000 118/77 97.8  F (36.6  C) Oral 71 18 97 %   11/21/22 1534 (!) 149/87 98.4  F (36.9  C) Oral 72 18 96 %   11/21/22 1244 133/85 98.3  F (36.8  C) Oral 92 18 97 %   11/21/22 0801 124/80 98.4  F (36.9  C) Oral 72 16 95 %         Intake/Output Summary (Last 24 hours) at 11/20/2022 1247  Last data filed at 11/20/2022 0406  Gross per 24 hour   Intake 2916 ml   Output --   Net 2916 ml       Head - Nomocephalic atraumatic  Sclera anicteric  Extremities warm and well perfused  Lungs - breathing non labored,   Abdomen - soft. Flat, minimally tender  Rectal exam - deferred  Skin - no rash  Psych - affect appropriate  Neuro - no focal deficits             Data:   All laboratory and imaging data in the past 24 hours reviewed    CBC  Lab Results    Component Value Date    WBC 8.4 11/20/2022    WBC 8.5 11/19/2022    WBC 18.0 (H) 11/18/2022    HGB 13.5 11/20/2022    HGB 13.4 11/19/2022    HGB 12.4 (L) 11/18/2022    HCT 41.7 11/20/2022    HCT 40.8 11/19/2022    HCT 38.3 (L) 11/18/2022     11/20/2022     (H) 11/19/2022     11/18/2022       BMP  Recent Labs   Lab Test 11/20/22  0508 11/19/22  1240 11/18/22  0649     --  137   POTASSIUM 4.1 4.2 3.9   CHLORIDE 102  --  101   CO2 28  --  26   ANIONGAP 10  --  10   GLC 98  --  110*   BUN 4.7*  --  5.8*   CR 1.09  --  0.89   HERI 9.0  --  8.9       Liver Function Studies -   Recent Labs   Lab Test 11/20/22  0508   PROTTOTAL 6.8   ALBUMIN 3.5   BILITOTAL 1.0   ALKPHOS 189*   AST 26   ALT 48                      Medications:     Current Facility-Administered Medications Ordered in Epic   Medication Dose Route Frequency Last Rate Last Admin     acetaminophen (TYLENOL) tablet 650 mg  650 mg Oral Q6H PRN   650 mg at 11/21/22 1033     amoxicillin-clavulanate (AUGMENTIN) 875-125 MG per tablet 1 tablet  1 tablet Oral Q12H [facility] (08/20)   1 tablet at 11/21/22 2026     famotidine (PEPCID) injection 20 mg  20 mg Intravenous Q12H   20 mg at 11/22/22 0508     lidocaine (LMX4) cream   Topical Q1H PRN         lidocaine 1 % 0.1-1 mL  0.1-1 mL Other Q1H PRN         melatonin tablet 10 mg  10 mg Oral At Bedtime PRN   10 mg at 11/21/22 2026     morphine (PF) injection 4 mg  4 mg Intravenous Q4H PRN   4 mg at 11/19/22 1941     naloxone (NARCAN) injection 0.2 mg  0.2 mg Intravenous Q2 Min PRN        Or     naloxone (NARCAN) injection 0.4 mg  0.4 mg Intravenous Q2 Min PRN        Or     naloxone (NARCAN) injection 0.2 mg  0.2 mg Intramuscular Q2 Min PRN        Or     naloxone (NARCAN) injection 0.4 mg  0.4 mg Intramuscular Q2 Min PRN         ondansetron (ZOFRAN ODT) ODT tab 4 mg  4 mg Oral Q6H PRN        Or     ondansetron (ZOFRAN) injection 4 mg  4 mg Intravenous Q6H PRN         oxyCODONE (ROXICODONE) tablet 5-10 mg   5-10 mg Oral Q4H PRN   5 mg at 11/19/22 0918     prochlorperazine (COMPAZINE) injection 10 mg  10 mg Intravenous Q6H PRN        Or     prochlorperazine (COMPAZINE) tablet 10 mg  10 mg Oral Q6H PRN        Or     prochlorperazine (COMPAZINE) suppository 25 mg  25 mg Rectal Q12H PRN         sennosides (SENOKOT) tablet 1 tablet  1 tablet Oral BID PRN   1 tablet at 11/18/22 0459     sodium chloride (PF) 0.9% PF flush 3 mL  3 mL Intracatheter Q8H   3 mL at 11/22/22 0047     sodium chloride (PF) 0.9% PF flush 3 mL  3 mL Intracatheter q1 min prn         No current [facility]-ordered outpatient medications on file.               Colon and Rectal Surgery Attending Note    Patient seen and examined independently.  Agree with above assessment and plan.  Feeling bettter. No pain. + bm and flatus  abd soft  Plan  CT with decrease is size of abscess  Plan home with oral abx.   Repeat CT scan in 7-10 days  Colonoscopy in 6-8 weeks   Outpatient follow up to discuss surgery.    Colette White MD  [facility]  [address], [address]  [place] [address]  T: [phone]  F: [phone]   [email or web address]

## 2022-11-22 NOTE — PLAN OF CARE
Goal Outcome Evaluation:         End of Shift Summary  For vital signs and complete assessments, please see documentation flowsheets.     Pertinent assessments: Pt A&OX4. Vss and on RA. Denied pain this shift. LS clear and no sob or chest pain. No c/o nausea and tolerated diet.     Major Shift Events :None    Treatment Plan: PO abx, symptom management, Possible discharge tomm.

## 2022-11-22 NOTE — PLAN OF CARE
To Do:  End of Shift Summary  For vital signs and complete assessments, please see documentation flowsheets.     Pertinent assessments: VSS. AO. Up ind to bathroom x1. Voided and had episode of diarrhea. Denies abd pain, but requests ice pack. General abd discomfort. BS normoactive. Reports numbness/tingling on upper L thigh d/t poor sleeping position. No nausea, no vomiting. Pt slept on/off.   Major Shift Events: None.   Treatment Plan: Monitor pain, abd discomfort, BM. Discharge likely 11/22.     Bedside Nurse: Pam Apple RN

## 2022-11-23 ENCOUNTER — PATIENT OUTREACH (OUTPATIENT)
Dept: CARE COORDINATION | Facility: CLINIC | Age: 32
End: 2022-11-23

## 2022-11-23 NOTE — TELEPHONE ENCOUNTER
Please help pt get short term disability paperwork to us. That is what is needed to get the week off of work. If also needing a letter for some reason, ok to send. Ideally you could call him because it may be easier to communicate all the details.

## 2022-11-23 NOTE — PROGRESS NOTES
[facility]    Background: Transitional Care Management program identified per system criteria and reviewed by [facility] team for possible outreach.    Assessment: Upon chart review, [facility] Team member will not proceed with patient outreach related to this episode of Transitional Care Management program due to reason below:    Patient can't talk right now.  Will talk to doctor on Friday.    Plan: Transitional Care Management episode addressed appropriately per reason noted above.      Eugenie Mcguire MA  [facility], [facility]    *Connected Care Resource Team does NOT follow patient ongoing. Referrals are identified based on internal discharge reports and the outreach is to ensure patient has an understanding of their discharge instructions.

## 2022-11-25 ENCOUNTER — ANCILLARY PROCEDURE (OUTPATIENT)
Dept: CT IMAGING | Facility: CLINIC | Age: 32
End: 2022-11-25
Attending: PHYSICIAN ASSISTANT
Payer: COMMERCIAL

## 2022-11-25 ENCOUNTER — OFFICE VISIT (OUTPATIENT)
Dept: PEDIATRICS | Facility: CLINIC | Age: 32
End: 2022-11-25
Payer: COMMERCIAL

## 2022-11-25 VITALS
RESPIRATION RATE: 18 BRPM | SYSTOLIC BLOOD PRESSURE: 116 MMHG | OXYGEN SATURATION: 97 % | WEIGHT: 230 LBS | BODY MASS INDEX: 32.93 KG/M2 | TEMPERATURE: 98.6 F | HEART RATE: 99 BPM | HEIGHT: 70 IN | DIASTOLIC BLOOD PRESSURE: 68 MMHG

## 2022-11-25 DIAGNOSIS — R17 ELEVATED BILIRUBIN: ICD-10-CM

## 2022-11-25 DIAGNOSIS — K57.20 PERFORATION OF SIGMOID COLON DUE TO DIVERTICULITIS: Primary | ICD-10-CM

## 2022-11-25 DIAGNOSIS — K57.92 DIVERTICULITIS: ICD-10-CM

## 2022-11-25 LAB
ALBUMIN SERPL BCG-MCNC: 4.1 G/DL (ref 3.5–5.2)
ALP SERPL-CCNC: 123 U/L (ref 40–129)
ALT SERPL W P-5'-P-CCNC: 74 U/L (ref 10–50)
ANION GAP SERPL CALCULATED.3IONS-SCNC: 11 MMOL/L (ref 7–15)
AST SERPL W P-5'-P-CCNC: 26 U/L (ref 10–50)
BASOPHILS # BLD AUTO: 0.1 10E3/UL (ref 0–0.2)
BASOPHILS NFR BLD AUTO: 0 %
BILIRUB SERPL-MCNC: 0.5 MG/DL
BUN SERPL-MCNC: 7.6 MG/DL (ref 6–20)
CALCIUM SERPL-MCNC: 9 MG/DL (ref 8.6–10)
CHLORIDE SERPL-SCNC: 105 MMOL/L (ref 98–107)
CREAT SERPL-MCNC: 0.86 MG/DL (ref 0.67–1.17)
DEPRECATED HCO3 PLAS-SCNC: 24 MMOL/L (ref 22–29)
EOSINOPHIL # BLD AUTO: 0.6 10E3/UL (ref 0–0.7)
EOSINOPHIL NFR BLD AUTO: 4 %
ERYTHROCYTE [DISTWIDTH] IN BLOOD BY AUTOMATED COUNT: 12.8 % (ref 10–15)
GFR SERPL CREATININE-BSD FRML MDRD: >90 ML/MIN/1.73M2
GGT SERPL-CCNC: 237 U/L (ref 8–61)
GLUCOSE SERPL-MCNC: 105 MG/DL (ref 70–99)
HCT VFR BLD AUTO: 43.4 % (ref 40–53)
HGB BLD-MCNC: 14.4 G/DL (ref 13.3–17.7)
LYMPHOCYTES # BLD AUTO: 2.5 10E3/UL (ref 0.8–5.3)
LYMPHOCYTES NFR BLD AUTO: 15 %
MCH RBC QN AUTO: 32.4 PG (ref 26.5–33)
MCHC RBC AUTO-ENTMCNC: 33.2 G/DL (ref 31.5–36.5)
MCV RBC AUTO: 98 FL (ref 78–100)
MONOCYTES # BLD AUTO: 1.8 10E3/UL (ref 0–1.3)
MONOCYTES NFR BLD AUTO: 11 %
NEUTROPHILS # BLD AUTO: 12 10E3/UL (ref 1.6–8.3)
NEUTROPHILS NFR BLD AUTO: 70 %
PLATELET # BLD AUTO: 502 10E3/UL (ref 150–450)
POTASSIUM SERPL-SCNC: 4.1 MMOL/L (ref 3.4–5.3)
PROT SERPL-MCNC: 7.2 G/DL (ref 6.4–8.3)
RBC # BLD AUTO: 4.44 10E6/UL (ref 4.4–5.9)
SODIUM SERPL-SCNC: 140 MMOL/L (ref 136–145)
WBC # BLD AUTO: 17 10E3/UL (ref 4–11)

## 2022-11-25 PROCEDURE — 80053 COMPREHEN METABOLIC PANEL: CPT | Performed by: NURSE PRACTITIONER

## 2022-11-25 PROCEDURE — 255N000002 HC RX 255 OP 636: Performed by: PHYSICIAN ASSISTANT

## 2022-11-25 PROCEDURE — 82977 ASSAY OF GGT: CPT | Performed by: NURSE PRACTITIONER

## 2022-11-25 PROCEDURE — 36415 COLL VENOUS BLD VENIPUNCTURE: CPT | Performed by: NURSE PRACTITIONER

## 2022-11-25 PROCEDURE — 99214 OFFICE O/P EST MOD 30 MIN: CPT | Performed by: NURSE PRACTITIONER

## 2022-11-25 PROCEDURE — 74177 CT ABD & PELVIS W/CONTRAST: CPT

## 2022-11-25 PROCEDURE — 85025 COMPLETE CBC W/AUTO DIFF WBC: CPT | Performed by: NURSE PRACTITIONER

## 2022-11-25 RX ORDER — METRONIDAZOLE 500 MG/1
500 TABLET ORAL 3 TIMES DAILY
Qty: 21 TABLET | Refills: 0 | Status: SHIPPED | OUTPATIENT
Start: 2022-11-25 | End: 2022-12-02

## 2022-11-25 RX ADMIN — IOHEXOL 100 ML: 350 INJECTION, SOLUTION INTRAVENOUS at 12:30

## 2022-11-25 ASSESSMENT — PAIN SCALES - GENERAL: PAINLEVEL: NO PAIN (0)

## 2022-11-25 NOTE — LETTER
November 25, 2022      Tony Bonilla  [address] [address]        To Whom It May Concern:    Tony Bonilla  was seen on 11/25/22.  He should not be more than a 4 hour drive from [facility] until his follow-up with the specialist 12/29/22.      Sincerely,        MICHEAL Rivers CNP

## 2022-11-25 NOTE — PROGRESS NOTES
Assessment & Plan     (K57.20) Perforation of sigmoid colon due to diverticulitis  (primary encounter diagnosis)  Comment: Abscess improving on last CT. Tolerating oral antibiotics. Has appt with colorectal in 1 month. Has a CT today and will repeat CBC to help assure that the infection is not worsening  Plan: CBC with platelets and differential        -Call me or to ER for any recurrent abdominal pain.     (R17) Elevated bilirubin  Comment: Noted in hospital. No longer drinking ETOH for over a month now  Plan: Comprehensive metabolic panel (BMP + Alb, Alk         Phos, ALT, AST, Total. Bili, TP), GGT        *  Return in about 3 months (around 2/25/2023) for Routine Visit.    MICHEAL Rivers CNP  [facility] [place] Jimenez is a 32 year old, presenting for the following health issues:  No chief complaint on file.    No pain now  Colorectal thought an abscess had formed  Tolerating oral antibiotics  Has colorectal haydee 12/29    \[facility] [place] [facility]\""       Hospital Follow-up Visit:    Hospital/Nursing Home/IP Rehab Facility: [facility]  Date of Admission: 11/16/22  Date of Discharge: 11/22/22  Reason(s) for Admission: Colonic diverticular abscess    Was your hospitalization related to COVID-19? No   Problems taking medications regularly:  None  Medication changes since discharge: None  Problems adhering to non-medication therapy:  None    Summary of hospitalization:  [facility] discharge summary reviewed  Diagnostic Tests/Treatments reviewed.  Follow up needed: colonoscopy, colorectal  Other Healthcare Providers Involved in Patient s Care:         Colorectal  Update since discharge: improved.   Plan of care communicated with patient  Review of Systems   Constitutional, HEENT, cardiovascular, pulmonary, GI, , musculoskeletal, neuro, skin, endocrine and psych systems are negative, except as otherwise noted.      Objective    /68 (BP Location: Right arm,  "Patient Position: Sitting, Cuff Size: Adult Large)   Pulse 99   Temp 98.6  F (37  C) (Tympanic)   Resp 18   Ht 1.765 m (5' 9.5\")   Wt 104.3 kg (230 lb)   SpO2 97%   BMI 33.48 kg/m    Body mass index is 33.48 kg/m .  Physical Exam   CONSTITUTIONAL: Alert, well-nourished, well-groomed, NAD  HRRR S1 S2 No MRG. No peripheral edema  GI: Abdomen flat. BS x 4. No TTP. No HSM or masses. No CVAT          "

## 2022-11-25 NOTE — LETTER
November 25, 2022      Tony Bonilla  [address] [address]        To Whom It May Concern:    Tony Bonilla  was seen on 11/25/22.  Please excuse him until Monday, December 5th due to illness (hospitalized and subsequent recovery). He may return a few days sooner if he's up for it.        Sincerely,        MICHEAL Rivers CNP

## 2022-11-28 NOTE — TELEPHONE ENCOUNTER
"Responding to pt's message in newer encounter. See [employer] message from 11/15/22.      - Juan \"Mathieu\" Alix (he/him/his), RN - Patient Advocate Liason (PAL)  [facility]      "

## 2022-11-29 ENCOUNTER — LAB (OUTPATIENT)
Dept: LAB | Facility: CLINIC | Age: 32
End: 2022-11-29
Payer: COMMERCIAL

## 2022-11-29 ENCOUNTER — TELEPHONE (OUTPATIENT)
Dept: PEDIATRICS | Facility: CLINIC | Age: 32
End: 2022-11-29

## 2022-11-29 DIAGNOSIS — K57.20 PERFORATION OF SIGMOID COLON DUE TO DIVERTICULITIS: ICD-10-CM

## 2022-11-29 DIAGNOSIS — R17 ELEVATED BILIRUBIN: ICD-10-CM

## 2022-11-29 DIAGNOSIS — K57.20 PERFORATION OF SIGMOID COLON DUE TO DIVERTICULITIS: Primary | ICD-10-CM

## 2022-11-29 LAB
ALBUMIN SERPL BCG-MCNC: 4.1 G/DL (ref 3.5–5.2)
ALP SERPL-CCNC: 93 U/L (ref 40–129)
ALT SERPL W P-5'-P-CCNC: 62 U/L (ref 10–50)
AST SERPL W P-5'-P-CCNC: 33 U/L (ref 10–50)
BASOPHILS # BLD AUTO: 0 10E3/UL (ref 0–0.2)
BASOPHILS NFR BLD AUTO: 0 %
BILIRUB DIRECT SERPL-MCNC: <0.2 MG/DL (ref 0–0.3)
BILIRUB SERPL-MCNC: 0.4 MG/DL
EOSINOPHIL # BLD AUTO: 0.6 10E3/UL (ref 0–0.7)
EOSINOPHIL NFR BLD AUTO: 5 %
ERYTHROCYTE [DISTWIDTH] IN BLOOD BY AUTOMATED COUNT: 13 % (ref 10–15)
HCT VFR BLD AUTO: 45 % (ref 40–53)
HGB BLD-MCNC: 14.8 G/DL (ref 13.3–17.7)
LYMPHOCYTES # BLD AUTO: 3.2 10E3/UL (ref 0.8–5.3)
LYMPHOCYTES NFR BLD AUTO: 25 %
MCH RBC QN AUTO: 32.4 PG (ref 26.5–33)
MCHC RBC AUTO-ENTMCNC: 32.9 G/DL (ref 31.5–36.5)
MCV RBC AUTO: 99 FL (ref 78–100)
MONOCYTES # BLD AUTO: 1 10E3/UL (ref 0–1.3)
MONOCYTES NFR BLD AUTO: 8 %
NEUTROPHILS # BLD AUTO: 8.1 10E3/UL (ref 1.6–8.3)
NEUTROPHILS NFR BLD AUTO: 63 %
PLATELET # BLD AUTO: 442 10E3/UL (ref 150–450)
PROT SERPL-MCNC: 6.8 G/DL (ref 6.4–8.3)
RBC # BLD AUTO: 4.57 10E6/UL (ref 4.4–5.9)
WBC # BLD AUTO: 12.9 10E3/UL (ref 4–11)

## 2022-11-29 PROCEDURE — 80076 HEPATIC FUNCTION PANEL: CPT

## 2022-11-29 PROCEDURE — 85025 COMPLETE CBC W/AUTO DIFF WBC: CPT

## 2022-11-29 PROCEDURE — 36415 COLL VENOUS BLD VENIPUNCTURE: CPT

## 2022-12-30 ENCOUNTER — TRANSFERRED RECORDS (OUTPATIENT)
Dept: HEALTH INFORMATION MANAGEMENT | Facility: CLINIC | Age: 32
End: 2022-12-30

## 2023-01-04 ENCOUNTER — TELEPHONE (OUTPATIENT)
Dept: GASTROENTEROLOGY | Facility: CLINIC | Age: 33
End: 2023-01-04

## 2023-01-04 NOTE — TELEPHONE ENCOUNTER
Scheduling Details      Caller: Dr Lynn Panchal's care coordinator at [facility]    [phone]  (Please ask for phone number if not scheduled by patient)    Type of Procedure Scheduled: Lower Endoscopy [Colonoscopy]    Surgeon: LYNN  Date of Procedure: 2/13  Location: [facility]    Sedation Type: MODERATE   Conscious Sedation- Needs  for 6 hours after the procedure  MAC/General-Needs  for 24 hours after procedure

## 2023-02-10 ENCOUNTER — OFFICE VISIT (OUTPATIENT)
Dept: PEDIATRICS | Facility: CLINIC | Age: 33
End: 2023-02-10
Payer: COMMERCIAL

## 2023-02-10 VITALS
WEIGHT: 232.8 LBS | RESPIRATION RATE: 20 BRPM | HEIGHT: 70 IN | HEART RATE: 83 BPM | TEMPERATURE: 98.6 F | SYSTOLIC BLOOD PRESSURE: 136 MMHG | DIASTOLIC BLOOD PRESSURE: 89 MMHG | OXYGEN SATURATION: 97 % | BODY MASS INDEX: 33.33 KG/M2

## 2023-02-10 DIAGNOSIS — Z00.00 HEALTHCARE MAINTENANCE: ICD-10-CM

## 2023-02-10 DIAGNOSIS — R06.2 WHEEZING: ICD-10-CM

## 2023-02-10 DIAGNOSIS — Z01.818 PREOP GENERAL PHYSICAL EXAM: Primary | ICD-10-CM

## 2023-02-10 DIAGNOSIS — K57.20 PERFORATION OF SIGMOID COLON DUE TO DIVERTICULITIS: ICD-10-CM

## 2023-02-10 DIAGNOSIS — F10.11 HISTORY OF ALCOHOL ABUSE: ICD-10-CM

## 2023-02-10 LAB
ALBUMIN SERPL BCG-MCNC: 4.8 G/DL (ref 3.5–5.2)
ALP SERPL-CCNC: 82 U/L (ref 40–129)
ALT SERPL W P-5'-P-CCNC: 87 U/L (ref 10–50)
ANION GAP SERPL CALCULATED.3IONS-SCNC: 16 MMOL/L (ref 7–15)
AST SERPL W P-5'-P-CCNC: 59 U/L (ref 10–50)
BASOPHILS # BLD AUTO: 0.1 10E3/UL (ref 0–0.2)
BASOPHILS NFR BLD AUTO: 1 %
BILIRUB SERPL-MCNC: 2 MG/DL
BUN SERPL-MCNC: 8.4 MG/DL (ref 6–20)
CALCIUM SERPL-MCNC: 9.7 MG/DL (ref 8.6–10)
CHLORIDE SERPL-SCNC: 102 MMOL/L (ref 98–107)
CREAT SERPL-MCNC: 1.04 MG/DL (ref 0.67–1.17)
DEPRECATED HCO3 PLAS-SCNC: 23 MMOL/L (ref 22–29)
EOSINOPHIL # BLD AUTO: 0.2 10E3/UL (ref 0–0.7)
EOSINOPHIL NFR BLD AUTO: 3 %
ERYTHROCYTE [DISTWIDTH] IN BLOOD BY AUTOMATED COUNT: 14.2 % (ref 10–15)
GFR SERPL CREATININE-BSD FRML MDRD: >90 ML/MIN/1.73M2
GLUCOSE SERPL-MCNC: 106 MG/DL (ref 70–99)
HCT VFR BLD AUTO: 47.5 % (ref 40–53)
HGB BLD-MCNC: 17.1 G/DL (ref 13.3–17.7)
LYMPHOCYTES # BLD AUTO: 2.2 10E3/UL (ref 0.8–5.3)
LYMPHOCYTES NFR BLD AUTO: 25 %
MCH RBC QN AUTO: 32.8 PG (ref 26.5–33)
MCHC RBC AUTO-ENTMCNC: 36 G/DL (ref 31.5–36.5)
MCV RBC AUTO: 91 FL (ref 78–100)
MONOCYTES # BLD AUTO: 0.8 10E3/UL (ref 0–1.3)
MONOCYTES NFR BLD AUTO: 9 %
NEUTROPHILS # BLD AUTO: 5.5 10E3/UL (ref 1.6–8.3)
NEUTROPHILS NFR BLD AUTO: 62 %
PLATELET # BLD AUTO: 317 10E3/UL (ref 150–450)
POTASSIUM SERPL-SCNC: 3.7 MMOL/L (ref 3.4–5.3)
PROT SERPL-MCNC: 7.5 G/DL (ref 6.4–8.3)
RBC # BLD AUTO: 5.22 10E6/UL (ref 4.4–5.9)
SODIUM SERPL-SCNC: 141 MMOL/L (ref 136–145)
WBC # BLD AUTO: 8.8 10E3/UL (ref 4–11)

## 2023-02-10 PROCEDURE — 85025 COMPLETE CBC W/AUTO DIFF WBC: CPT | Performed by: NURSE PRACTITIONER

## 2023-02-10 PROCEDURE — 36415 COLL VENOUS BLD VENIPUNCTURE: CPT | Performed by: NURSE PRACTITIONER

## 2023-02-10 PROCEDURE — 90471 IMMUNIZATION ADMIN: CPT | Performed by: NURSE PRACTITIONER

## 2023-02-10 PROCEDURE — 80053 COMPREHEN METABOLIC PANEL: CPT | Performed by: NURSE PRACTITIONER

## 2023-02-10 PROCEDURE — 90715 TDAP VACCINE 7 YRS/> IM: CPT | Performed by: NURSE PRACTITIONER

## 2023-02-10 PROCEDURE — 99214 OFFICE O/P EST MOD 30 MIN: CPT | Mod: 25 | Performed by: NURSE PRACTITIONER

## 2023-02-10 SDOH — ECONOMIC STABILITY: FOOD INSECURITY: WITHIN THE PAST 12 MONTHS, THE FOOD YOU BOUGHT JUST DIDN'T LAST AND YOU DIDN'T HAVE MONEY TO GET MORE.: NEVER TRUE

## 2023-02-10 SDOH — ECONOMIC STABILITY: INCOME INSECURITY: IN THE LAST 12 MONTHS, WAS THERE A TIME WHEN YOU WERE NOT ABLE TO PAY THE MORTGAGE OR RENT ON TIME?: NO

## 2023-02-10 SDOH — HEALTH STABILITY: PHYSICAL HEALTH: ON AVERAGE, HOW MANY DAYS PER WEEK DO YOU ENGAGE IN MODERATE TO STRENUOUS EXERCISE (LIKE A BRISK WALK)?: 1 DAY

## 2023-02-10 SDOH — ECONOMIC STABILITY: INCOME INSECURITY: HOW HARD IS IT FOR YOU TO PAY FOR THE VERY BASICS LIKE FOOD, HOUSING, MEDICAL CARE, AND HEATING?: SOMEWHAT HARD

## 2023-02-10 SDOH — HEALTH STABILITY: PHYSICAL HEALTH: ON AVERAGE, HOW MANY MINUTES DO YOU ENGAGE IN EXERCISE AT THIS LEVEL?: 30 MIN

## 2023-02-10 SDOH — ECONOMIC STABILITY: TRANSPORTATION INSECURITY
IN THE PAST 12 MONTHS, HAS THE LACK OF TRANSPORTATION KEPT YOU FROM MEDICAL APPOINTMENTS OR FROM GETTING MEDICATIONS?: NO

## 2023-02-10 SDOH — ECONOMIC STABILITY: TRANSPORTATION INSECURITY
IN THE PAST 12 MONTHS, HAS LACK OF TRANSPORTATION KEPT YOU FROM MEETINGS, WORK, OR FROM GETTING THINGS NEEDED FOR DAILY LIVING?: NO

## 2023-02-10 SDOH — ECONOMIC STABILITY: FOOD INSECURITY: WITHIN THE PAST 12 MONTHS, YOU WORRIED THAT YOUR FOOD WOULD RUN OUT BEFORE YOU GOT MONEY TO BUY MORE.: NEVER TRUE

## 2023-02-10 ASSESSMENT — LIFESTYLE VARIABLES
AUDIT-C TOTAL SCORE: 3
SKIP TO QUESTIONS 9-10: 0
HOW MANY STANDARD DRINKS CONTAINING ALCOHOL DO YOU HAVE ON A TYPICAL DAY: 1 OR 2
HOW OFTEN DO YOU HAVE A DRINK CONTAINING ALCOHOL: 2-4 TIMES A MONTH
HOW OFTEN DO YOU HAVE SIX OR MORE DRINKS ON ONE OCCASION: LESS THAN MONTHLY

## 2023-02-10 ASSESSMENT — SOCIAL DETERMINANTS OF HEALTH (SDOH)
ARE YOU MARRIED, WIDOWED, DIVORCED, SEPARATED, NEVER MARRIED, OR LIVING WITH A PARTNER?: LIVING WITH PARTNER
DO YOU BELONG TO ANY CLUBS OR ORGANIZATIONS SUCH AS CHURCH GROUPS UNIONS, FRATERNAL OR ATHLETIC GROUPS, OR SCHOOL GROUPS?: YES
IN A TYPICAL WEEK, HOW MANY TIMES DO YOU TALK ON THE PHONE WITH FAMILY, FRIENDS, OR NEIGHBORS?: ONCE A WEEK
HOW OFTEN DO YOU GET TOGETHER WITH FRIENDS OR RELATIVES?: ONCE A WEEK
HOW OFTEN DO YOU ATTEND CHURCH OR RELIGIOUS SERVICES?: MORE THAN 4 TIMES PER YEAR

## 2023-02-10 NOTE — H&P (VIEW-ONLY)
[facility] [place]  [address]  [address]  [place] [address]  Phone: [phone]  Fax: [phone]  Primary Provider: Margaret Tirado  Pre-op Performing Provider: MARGARET TIRADO      PREOPERATIVE EVALUATION:  Today's date: 2/10/2023    Tony Bonilla is a 32 year old male who presents for a preoperative evaluation.    Surgical Information:  Surgery/Procedure: Colonoscopy and robotic assisted sigmoid colon resection  Surgery Location: [facility]  Surgeon: Colette White  Surgery Date: 2/13/23 and 2/14/23  Time of Surgery: 12:45pm  Where patient plans to recover: At home with family  Fax number for surgical facility: Note does not need to be faxed, will be available electronically in Epic.    Type of Anesthesia Anticipated: General    Assessment & Plan     The proposed surgical procedure is considered INTERMEDIATE risk.    (Z01.818) Preop general physical exam  (primary encounter diagnosis)  (K57.20) Perforation of sigmoid colon due to diverticulitis  Plan: Comprehensive metabolic panel (BMP + Alb, Alk         Phos, ALT, AST, Total. Bili, TP), CBC with         platelets and differential        Medically optimized for surgery    (Z00.00) Healthcare maintenance  Plan: TDAP VACCINE (Adacel, Boostrix)  [[id number]]    (F10.11) History of alcohol abuse  Comment: History of alcohol abuse, had been sober for a period of time and now back to social drinking on weekends  Addendum: Pt bilirubin elevated on labs and liver enzymes slightly elevated compared to last check. Possibly drinking more than he is admitting. Recommend ETOH withdrawal monitoring protocol postop.     (R06.2) Wheezing  Comment: Intermittent wheeze in LLL. Patient reports history of cough, was worse when smoking 5 cigars a day, has now improved with smoking 1 cigar a day. Should not interfere with surgery. Patient denies CP, SOB, TOLENTINO.      Risks and Recommendations:  The patient has  the following additional risks and recommendations for perioperative complications:   - No identified additional risk factors other than previously addressed  -Recommend ETOH withdrawal monitoring.    Medication Instructions:  Patient is on no chronic medications    RECOMMENDATION:  APPROVAL GIVEN to proceed with proposed procedure, without further diagnostic evaluation.            Subjective     HPI related to upcoming procedure: History of perforation with diverticulitis and abscess     Preop Questions 2/10/2023   1. Have you ever had a heart attack or stroke? No   2. Have you ever had surgery on your heart or blood vessels, such as a stent placement, a coronary artery bypass, or surgery on an artery in your head, neck, heart, or legs? No   3. Do you have chest pain with activity? No   4. Do you have a history of  heart failure? No   5. Do you currently have a cold, bronchitis or symptoms of other infection? No   6. Do you have a cough, shortness of breath, or wheezing? No   7. Do you or anyone in your family have previous history of blood clots? YES - dad had DVT, unsure of cause   8. Do you or does anyone in your family have a serious bleeding problem such as prolonged bleeding following surgeries or cuts? No   9. Have you ever had problems with anemia or been told to take iron pills? No   10. Have you had any abnormal blood loss such as black, tarry or bloody stools? No   11. Have you ever had a blood transfusion? No   12. Are you willing to have a blood transfusion if it is medically needed before, during, or after your surgery? Yes   13. Have you or any of your relatives ever had problems with anesthesia? No   14. Do you have sleep apnea, excessive snoring or daytime drowsiness? No   15. Do you have any artifical heart valves or other implanted medical devices like a pacemaker, defibrillator, or continuous glucose monitor? No   16. Do you have artificial joints? No   17. Are you allergic to latex? No      Preoperative Review of :   reviewed - no record of controlled substances prescribed.          Review of Systems  CONSTITUTIONAL: NEGATIVE for fever, chills, change in weight  INTEGUMENTARY/SKIN: NEGATIVE for worrisome rashes, moles or lesions  EYES: NEGATIVE for vision changes or irritation  ENT/MOUTH: NEGATIVE for ear, mouth and throat problems  RESP: NEGATIVE for significant cough or SOB  CV: NEGATIVE for chest pain, palpitations or peripheral edema  GI: NEGATIVE for nausea, abdominal pain, heartburn, or change in bowel habits  : NEGATIVE for frequency, dysuria, or hematuria  MUSCULOSKELETAL: NEGATIVE for significant arthralgias or myalgia  NEURO: NEGATIVE for weakness, dizziness or paresthesias  ENDOCRINE: NEGATIVE for temperature intolerance, skin/hair changes  HEME: NEGATIVE for bleeding problems  PSYCHIATRIC: NEGATIVE for changes in mood or affect    Patient Active Problem List    Diagnosis Date Noted     Colonic diverticular abscess 11/16/2022     Priority: Medium     Alcohol use disorder, severe, dependence (H) 10/31/2022     Priority: Medium     Hepatic steatosis 10/31/2022     Priority: Medium     Icterus 10/31/2022     Priority: Medium     Sigmoid diverticulitis 10/31/2022     Priority: Medium     Elevated bilirubin 10/31/2022     Priority: Medium     Perforation of sigmoid colon due to diverticulitis 10/31/2022     Priority: Medium     Personal history of tobacco use, presenting hazards to health 10/31/2022     Priority: Medium      No past medical history on file.  No past surgical history on file.  Current Outpatient Medications   Medication Sig Dispense Refill     acetaminophen (TYLENOL) 500 MG tablet Take 1,000 mg by mouth 2 times daily as needed for mild pain       ondansetron (ZOFRAN ODT) 4 MG ODT tab Take 1 tablet (4 mg) by mouth every 6 hours as needed for nausea or vomiting (Patient not taking: Reported on 2/10/2023) 20 tablet 0       Allergies   Allergen Reactions     Cefaclor GI  "Disturbance     Ciprofloxacin Muscle Pain (Myalgia)     Achilles pain        Social History     Tobacco Use     Smoking status: Every Day     Types: Cigars, Other     Start date: 1/1/2015     Smokeless tobacco: Never     Tobacco comments:     Vape   Substance Use Topics     Alcohol use: Not Currently     Comment: 2-3 drinks a night - since 2016     Family History   Problem Relation Age of Onset     Liver Disease Mother 60        due to ETOH     Kidney Disease Mother      No Known Problems Father      Other - See Comments Half-Sister         falling accident     History   Drug Use Unknown         Objective     /89 (BP Location: Right arm, Patient Position: Sitting, Cuff Size: Adult Large)   Pulse 83   Temp 98.6  F (37  C) (Tympanic)   Resp 20   Ht 1.772 m (5' 9.75\")   Wt 105.6 kg (232 lb 12.8 oz)   SpO2 97%   BMI 33.64 kg/m      Physical Exam    GENERAL APPEARANCE: healthy, alert and no distress     EYES: EOMI,  PERRL     HENT: ear canals and TM's normal and nose and mouth without ulcers or lesions     NECK: no adenopathy, no asymmetry, masses, or scars and thyroid normal to palpation     RESP: left lower lung with intermittent wheeze, other lung sounds clear to auscultation     CV: regular rates and rhythm, normal S1 S2, no S3 or S4 and no murmur, click or rub     ABDOMEN:  soft, nontender, no HSM or masses and bowel sounds normal     MS: extremities normal- no gross deformities noted, no evidence of inflammation in joints, FROM in all extremities.     SKIN: no suspicious lesions or rashes     PSYCH: mentation appears normal. and affect normal/bright     LYMPHATICS: No cervical adenopathy    Recent Labs   Lab Test 11/29/22  1211 11/25/22  1043 11/22/22  0752 11/21/22  0757 11/20/22  0508   HGB 14.8 14.4  --   --  13.5    502*  --   --  450   NA  --  140  --   --  140   POTASSIUM  --  4.1 4.3   < > 4.1   CR  --  0.86  --   --  1.09    < > = values in this interval not displayed.    " "    Diagnostics:  Labs pending at this time.  Results will be reviewed when available.   No EKG required, no history of coronary heart disease, significant arrhythmia, peripheral arterial disease or other structural heart disease.    Revised Cardiac Risk Index (RCRI):  The patient has the following serious cardiovascular risks for perioperative complications:   - No serious cardiac risks = 0 points     RCRI Interpretation: 0 points: Class I (very low risk - 0.4% complication rate)         RUBA Deng, RN, FNP Student  \"I was present with the student who participated in the visit and documentation. I've verified the history and personally performed the exam and medical decision making. I agree with the assessment and plan as documented in the note. TERESITA Dixon-TABATHA, RN.    Signed Electronically by: MICHEAL Rivers CNP  Copy of this evaluation report is provided to requesting physician.      "

## 2023-02-10 NOTE — PATIENT INSTRUCTIONS
For informational purposes only. Not to replace the advice of your health care provider. Copyright   2003,  [email or web address] [facility]. All rights reserved. Clinically reviewed by Lakshmi Jordan MD. Jiglu [id number] - REV .  Preparing for Your Surgery  Getting started  A nurse will call you to review your health history and instructions. They will give you an arrival time based on your scheduled surgery time. Please be ready to share:    Your doctor's clinic name and phone number    Your medical, surgical, and anesthesia history    A list of allergies and sensitivities    A list of medicines, including herbal treatments and over-the-counter drugs    Whether the patient has a legal guardian (ask how to send us the papers in advance)  Please tell us if you're pregnant--or if there's any chance you might be pregnant. Some surgeries may injure a fetus (unborn baby), so they require a pregnancy test. Surgeries that are safe for a fetus don't always need a test, and you can choose whether to have one.   If you have a child who's having surgery, please ask for a copy of Preparing for Your Child's Surgery.    Preparing for surgery    Within 10 to 30 days of surgery: Have a pre-op exam (sometimes called an H&P, or History and Physical). This can be done at a clinic or pre-operative center.  ? If you're having a , you may not need this exam. Talk to your care team.    At your pre-op exam, talk to your care team about all medicines you take. If you need to stop any medicines before surgery, ask when to start taking them again.  ? We do this for your safety. Many medicines can make you bleed too much during surgery. Some change how well surgery (anesthesia) drugs work.    Call your insurance company to let them know you're having surgery. (If you don't have insurance, call [phone].)    Call your clinic if there's any change in your health. This includes signs of a cold or flu (sore throat, runny nose,  cough, rash, fever). It also includes a scrape or scratch near the surgery site.    If you have questions on the day of surgery, call your hospital or surgery center.  Eating and drinking guidelines  For your safety: Unless your surgeon tells you otherwise, follow the guidelines below.    Eat and drink as usual until 8 hours before you arrive for surgery. After that, no food or milk.    Drink clear liquids until 2 hours before you arrive. These are liquids you can see through, like water, Gatorade, and Propel Water. They also include plain black coffee and tea (no cream or milk), candy, and breath mints. You can spit out gum when you arrive.    If you drink alcohol: Stop drinking it the night before surgery.    If your care team tells you to take medicine on the morning of surgery, it's okay to take it with a sip of water.  Preventing infection    Shower or bathe the night before and morning of your surgery. Follow the instructions your clinic gave you. (If no instructions, use regular soap.)    Don't shave or clip hair near your surgery site. We'll remove the hair if needed.    Don't smoke or vape the morning of surgery. You may chew nicotine gum up to 2 hours before surgery. A nicotine patch is okay.  ? Note: Some surgeries require you to completely quit smoking and nicotine. Check with your surgeon.    Your care team will make every effort to keep you safe from infection. We will:  ? Clean our hands often with soap and water (or an alcohol-based hand rub).  ? Clean the skin at your surgery site with a special soap that kills germs.  ? Give you a special gown to keep you warm. (Cold raises the risk of infection.)  ? Wear special hair covers, masks, gowns and gloves during surgery.  ? Give antibiotic medicine, if prescribed. Not all surgeries need antibiotics.  What to bring on the day of surgery    Photo ID and insurance card    Copy of your health care directive, if you have one    Glasses and hearing aids (bring  cases)  ? You can't wear contacts during surgery    Inhaler and eye drops, if you use them (tell us about these when you arrive)    CPAP machine or breathing device, if you use them    A few personal items, if spending the night    If you have . . .  ? A pacemaker, ICD (cardiac defibrillator) or other implant: Bring the ID card.  ? An implanted stimulator: Bring the remote control.  ? A legal guardian: Bring a copy of the certified (court-stamped) guardianship papers.  Please remove any jewelry, including body piercings. Leave jewelry and other valuables at home.  If you're going home the day of surgery    You must have a responsible adult drive you home. They should stay with you overnight as well.    If you don't have someone to stay with you, and you aren't safe to go home alone, we may keep you overnight. Insurance often won't pay for this.  After surgery  If it's hard to control your pain or you need more pain medicine, please call your surgeon's office.  Questions?   If you have any questions for your care team, list them here: _________________________________________________________________________________________________________________________________________________________________________ ____________________________________ ____________________________________ ____________________________________

## 2023-02-10 NOTE — PROGRESS NOTES
[facility] [place]  [address]  [address]  [place] [address]  Phone: [phone]  Fax: [phone]  Primary Provider: Margaret Tirado  Pre-op Performing Provider: MARGARET TIRADO      PREOPERATIVE EVALUATION:  Today's date: 2/10/2023    Tony Bonilla is a 32 year old male who presents for a preoperative evaluation.    Surgical Information:  Surgery/Procedure: Colonoscopy and robotic assisted sigmoid colon resection  Surgery Location: [facility]  Surgeon: Colette White  Surgery Date: 2/13/23 and 2/14/23  Time of Surgery: 12:45pm  Where patient plans to recover: At home with family  Fax number for surgical facility: Note does not need to be faxed, will be available electronically in Epic.    Type of Anesthesia Anticipated: General    Assessment & Plan     The proposed surgical procedure is considered INTERMEDIATE risk.    (Z01.818) Preop general physical exam  (primary encounter diagnosis)  (K57.20) Perforation of sigmoid colon due to diverticulitis  Plan: Comprehensive metabolic panel (BMP + Alb, Alk         Phos, ALT, AST, Total. Bili, TP), CBC with         platelets and differential        Medically optimized for surgery    (Z00.00) Healthcare maintenance  Plan: TDAP VACCINE (Adacel, Boostrix)  [[id number]]    (F10.11) History of alcohol abuse  Comment: History of alcohol abuse, had been sober for a period of time and now back to social drinking on weekends  Addendum: Pt bilirubin elevated on labs and liver enzymes slightly elevated compared to last check. Possibly drinking more than he is admitting. Recommend ETOH withdrawal monitoring protocol postop.     (R06.2) Wheezing  Comment: Intermittent wheeze in LLL. Patient reports history of cough, was worse when smoking 5 cigars a day, has now improved with smoking 1 cigar a day. Should not interfere with surgery. Patient denies CP, SOB, TOLENTINO.      Risks and Recommendations:  The patient has  the following additional risks and recommendations for perioperative complications:   - No identified additional risk factors other than previously addressed  -Recommend ETOH withdrawal monitoring.    Medication Instructions:  Patient is on no chronic medications    RECOMMENDATION:  APPROVAL GIVEN to proceed with proposed procedure, without further diagnostic evaluation.            Subjective     HPI related to upcoming procedure: History of perforation with diverticulitis and abscess     Preop Questions 2/10/2023   1. Have you ever had a heart attack or stroke? No   2. Have you ever had surgery on your heart or blood vessels, such as a stent placement, a coronary artery bypass, or surgery on an artery in your head, neck, heart, or legs? No   3. Do you have chest pain with activity? No   4. Do you have a history of  heart failure? No   5. Do you currently have a cold, bronchitis or symptoms of other infection? No   6. Do you have a cough, shortness of breath, or wheezing? No   7. Do you or anyone in your family have previous history of blood clots? YES - dad had DVT, unsure of cause   8. Do you or does anyone in your family have a serious bleeding problem such as prolonged bleeding following surgeries or cuts? No   9. Have you ever had problems with anemia or been told to take iron pills? No   10. Have you had any abnormal blood loss such as black, tarry or bloody stools? No   11. Have you ever had a blood transfusion? No   12. Are you willing to have a blood transfusion if it is medically needed before, during, or after your surgery? Yes   13. Have you or any of your relatives ever had problems with anesthesia? No   14. Do you have sleep apnea, excessive snoring or daytime drowsiness? No   15. Do you have any artifical heart valves or other implanted medical devices like a pacemaker, defibrillator, or continuous glucose monitor? No   16. Do you have artificial joints? No   17. Are you allergic to latex? No      Preoperative Review of :   reviewed - no record of controlled substances prescribed.          Review of Systems  CONSTITUTIONAL: NEGATIVE for fever, chills, change in weight  INTEGUMENTARY/SKIN: NEGATIVE for worrisome rashes, moles or lesions  EYES: NEGATIVE for vision changes or irritation  ENT/MOUTH: NEGATIVE for ear, mouth and throat problems  RESP: NEGATIVE for significant cough or SOB  CV: NEGATIVE for chest pain, palpitations or peripheral edema  GI: NEGATIVE for nausea, abdominal pain, heartburn, or change in bowel habits  : NEGATIVE for frequency, dysuria, or hematuria  MUSCULOSKELETAL: NEGATIVE for significant arthralgias or myalgia  NEURO: NEGATIVE for weakness, dizziness or paresthesias  ENDOCRINE: NEGATIVE for temperature intolerance, skin/hair changes  HEME: NEGATIVE for bleeding problems  PSYCHIATRIC: NEGATIVE for changes in mood or affect    Patient Active Problem List    Diagnosis Date Noted     Colonic diverticular abscess 11/16/2022     Priority: Medium     Alcohol use disorder, severe, dependence (H) 10/31/2022     Priority: Medium     Hepatic steatosis 10/31/2022     Priority: Medium     Icterus 10/31/2022     Priority: Medium     Sigmoid diverticulitis 10/31/2022     Priority: Medium     Elevated bilirubin 10/31/2022     Priority: Medium     Perforation of sigmoid colon due to diverticulitis 10/31/2022     Priority: Medium     Personal history of tobacco use, presenting hazards to health 10/31/2022     Priority: Medium      No past medical history on file.  No past surgical history on file.  Current Outpatient Medications   Medication Sig Dispense Refill     acetaminophen (TYLENOL) 500 MG tablet Take 1,000 mg by mouth 2 times daily as needed for mild pain       ondansetron (ZOFRAN ODT) 4 MG ODT tab Take 1 tablet (4 mg) by mouth every 6 hours as needed for nausea or vomiting (Patient not taking: Reported on 2/10/2023) 20 tablet 0       Allergies   Allergen Reactions     Cefaclor GI  "Disturbance     Ciprofloxacin Muscle Pain (Myalgia)     Achilles pain        Social History     Tobacco Use     Smoking status: Every Day     Types: Cigars, Other     Start date: 1/1/2015     Smokeless tobacco: Never     Tobacco comments:     Vape   Substance Use Topics     Alcohol use: Not Currently     Comment: 2-3 drinks a night - since 2016     Family History   Problem Relation Age of Onset     Liver Disease Mother 60        due to ETOH     Kidney Disease Mother      No Known Problems Father      Other - See Comments Half-Sister         falling accident     History   Drug Use Unknown         Objective     /89 (BP Location: Right arm, Patient Position: Sitting, Cuff Size: Adult Large)   Pulse 83   Temp 98.6  F (37  C) (Tympanic)   Resp 20   Ht 1.772 m (5' 9.75\")   Wt 105.6 kg (232 lb 12.8 oz)   SpO2 97%   BMI 33.64 kg/m      Physical Exam    GENERAL APPEARANCE: healthy, alert and no distress     EYES: EOMI,  PERRL     HENT: ear canals and TM's normal and nose and mouth without ulcers or lesions     NECK: no adenopathy, no asymmetry, masses, or scars and thyroid normal to palpation     RESP: left lower lung with intermittent wheeze, other lung sounds clear to auscultation     CV: regular rates and rhythm, normal S1 S2, no S3 or S4 and no murmur, click or rub     ABDOMEN:  soft, nontender, no HSM or masses and bowel sounds normal     MS: extremities normal- no gross deformities noted, no evidence of inflammation in joints, FROM in all extremities.     SKIN: no suspicious lesions or rashes     PSYCH: mentation appears normal. and affect normal/bright     LYMPHATICS: No cervical adenopathy    Recent Labs   Lab Test 11/29/22  1211 11/25/22  1043 11/22/22  0752 11/21/22  0757 11/20/22  0508   HGB 14.8 14.4  --   --  13.5    502*  --   --  450   NA  --  140  --   --  140   POTASSIUM  --  4.1 4.3   < > 4.1   CR  --  0.86  --   --  1.09    < > = values in this interval not displayed.    " "    Diagnostics:  Labs pending at this time.  Results will be reviewed when available.   No EKG required, no history of coronary heart disease, significant arrhythmia, peripheral arterial disease or other structural heart disease.    Revised Cardiac Risk Index (RCRI):  The patient has the following serious cardiovascular risks for perioperative complications:   - No serious cardiac risks = 0 points     RCRI Interpretation: 0 points: Class I (very low risk - 0.4% complication rate)         RUBA Deng, RN, FNP Student  \"I was present with the student who participated in the visit and documentation. I've verified the history and personally performed the exam and medical decision making. I agree with the assessment and plan as documented in the note. TERESITA Dixon-TABATHA, RN.    Signed Electronically by: MICHEAL Rivers CNP  Copy of this evaluation report is provided to requesting physician.      "

## 2023-02-11 ENCOUNTER — HEALTH MAINTENANCE LETTER (OUTPATIENT)
Age: 33
End: 2023-02-11

## 2023-02-13 ENCOUNTER — HOSPITAL ENCOUNTER (OUTPATIENT)
Facility: CLINIC | Age: 33
Discharge: HOME | End: 2023-02-13
Attending: COLON & RECTAL SURGERY | Admitting: COLON & RECTAL SURGERY
Payer: COMMERCIAL

## 2023-02-13 VITALS
SYSTOLIC BLOOD PRESSURE: 132 MMHG | RESPIRATION RATE: 16 BRPM | HEART RATE: 75 BPM | TEMPERATURE: 98 F | OXYGEN SATURATION: 94 % | DIASTOLIC BLOOD PRESSURE: 85 MMHG

## 2023-02-13 LAB — COLONOSCOPY: NORMAL

## 2023-02-13 PROCEDURE — G0500 MOD SEDAT ENDO SERVICE >5YRS: HCPCS | Performed by: COLON & RECTAL SURGERY

## 2023-02-13 PROCEDURE — 88305 TISSUE EXAM BY PATHOLOGIST: CPT | Mod: TC | Performed by: COLON & RECTAL SURGERY

## 2023-02-13 PROCEDURE — 250N000013 HC RX MED GY IP 250 OP 250 PS 637: Performed by: COLON & RECTAL SURGERY

## 2023-02-13 PROCEDURE — 88305 TISSUE EXAM BY PATHOLOGIST: CPT | Mod: 26 | Performed by: PATHOLOGY

## 2023-02-13 PROCEDURE — 0DBP8ZX EXCISION OF RECTUM, VIA NATURAL OR ARTIFICIAL OPENING ENDOSCOPIC, DIAGNOSTIC: ICD-10-PCS | Performed by: COLON & RECTAL SURGERY

## 2023-02-13 PROCEDURE — 45385 COLONOSCOPY W/LESION REMOVAL: CPT | Performed by: COLON & RECTAL SURGERY

## 2023-02-13 PROCEDURE — 250N000011 HC RX IP 250 OP 636: Performed by: COLON & RECTAL SURGERY

## 2023-02-13 RX ORDER — ONDANSETRON 2 MG/ML
4 INJECTION INTRAMUSCULAR; INTRAVENOUS EVERY 6 HOURS PRN
Status: DISCONTINUED | OUTPATIENT
Start: 2023-02-13 | End: 2023-02-13 | Stop reason: HOSPADM

## 2023-02-13 RX ORDER — NALOXONE HYDROCHLORIDE 0.4 MG/ML
0.4 INJECTION, SOLUTION INTRAMUSCULAR; INTRAVENOUS; SUBCUTANEOUS
Status: DISCONTINUED | OUTPATIENT
Start: 2023-02-13 | End: 2023-02-13 | Stop reason: HOSPADM

## 2023-02-13 RX ORDER — NALOXONE HYDROCHLORIDE 0.4 MG/ML
0.2 INJECTION, SOLUTION INTRAMUSCULAR; INTRAVENOUS; SUBCUTANEOUS
Status: DISCONTINUED | OUTPATIENT
Start: 2023-02-13 | End: 2023-02-13 | Stop reason: HOSPADM

## 2023-02-13 RX ORDER — FENTANYL CITRATE 50 UG/ML
50-100 INJECTION, SOLUTION INTRAMUSCULAR; INTRAVENOUS EVERY 5 MIN PRN
Status: DISCONTINUED | OUTPATIENT
Start: 2023-02-13 | End: 2023-02-13 | Stop reason: HOSPADM

## 2023-02-13 RX ORDER — EPINEPHRINE 1 MG/ML
0.1 INJECTION, SOLUTION INTRAMUSCULAR; SUBCUTANEOUS
Status: DISCONTINUED | OUTPATIENT
Start: 2023-02-13 | End: 2023-02-13 | Stop reason: HOSPADM

## 2023-02-13 RX ORDER — SIMETHICONE 40MG/0.6ML
133 SUSPENSION, DROPS(FINAL DOSAGE FORM)(ML) ORAL
Status: COMPLETED | OUTPATIENT
Start: 2023-02-13 | End: 2023-02-13

## 2023-02-13 RX ORDER — LIDOCAINE 40 MG/G
CREAM TOPICAL
Status: DISCONTINUED | OUTPATIENT
Start: 2023-02-13 | End: 2023-02-13 | Stop reason: HOSPADM

## 2023-02-13 RX ORDER — FLUMAZENIL 0.1 MG/ML
0.2 INJECTION, SOLUTION INTRAVENOUS
Status: DISCONTINUED | OUTPATIENT
Start: 2023-02-13 | End: 2023-02-13 | Stop reason: HOSPADM

## 2023-02-13 RX ORDER — DIPHENHYDRAMINE HYDROCHLORIDE 50 MG/ML
25-50 INJECTION INTRAMUSCULAR; INTRAVENOUS
Status: DISCONTINUED | OUTPATIENT
Start: 2023-02-13 | End: 2023-02-13 | Stop reason: HOSPADM

## 2023-02-13 RX ORDER — ONDANSETRON 2 MG/ML
4 INJECTION INTRAMUSCULAR; INTRAVENOUS
Status: DISCONTINUED | OUTPATIENT
Start: 2023-02-13 | End: 2023-02-13 | Stop reason: HOSPADM

## 2023-02-13 RX ORDER — PROCHLORPERAZINE MALEATE 10 MG
10 TABLET ORAL EVERY 6 HOURS PRN
Status: DISCONTINUED | OUTPATIENT
Start: 2023-02-13 | End: 2023-02-13 | Stop reason: HOSPADM

## 2023-02-13 RX ORDER — ATROPINE SULFATE 0.1 MG/ML
1 INJECTION INTRAVENOUS
Status: DISCONTINUED | OUTPATIENT
Start: 2023-02-13 | End: 2023-02-13 | Stop reason: HOSPADM

## 2023-02-13 RX ORDER — ONDANSETRON 4 MG/1
4 TABLET, ORALLY DISINTEGRATING ORAL EVERY 6 HOURS PRN
Status: DISCONTINUED | OUTPATIENT
Start: 2023-02-13 | End: 2023-02-13 | Stop reason: HOSPADM

## 2023-02-13 RX ADMIN — FENTANYL CITRATE 100 MCG: 50 INJECTION, SOLUTION INTRAMUSCULAR; INTRAVENOUS at 11:48

## 2023-02-13 RX ADMIN — SIMETHICONE 133 MG: 20 EMULSION ORAL at 11:56

## 2023-02-13 RX ADMIN — MIDAZOLAM 1 MG: 1 INJECTION INTRAMUSCULAR; INTRAVENOUS at 11:54

## 2023-02-13 RX ADMIN — MIDAZOLAM 2 MG: 1 INJECTION INTRAMUSCULAR; INTRAVENOUS at 11:48

## 2023-02-13 ASSESSMENT — ACTIVITIES OF DAILY LIVING (ADL): ADLS_ACUITY_SCORE: 35

## 2023-02-13 NOTE — H&P
Pre-Endoscopy History and Physical     Tony Bonilla MRN# [id number]   YOB: 1990 Age: 32 year old     Date of Procedure: 2/13/2023  Primary care provider: Kathy Caldera  Type of Endoscopy: colonoscopy  Reason for Procedure: abnormal CT scan, diverticulitis  Type of Anesthesia Anticipated: Moderate Sedation    HPI:    Tony is a 32 year old male who will be undergoing the above procedure.      A history and physical has been performed. The patient's medications and allergies have been reviewed. The risks and benefits of the procedure and the sedation options and risks were discussed with the patient.  All questions were answered and informed consent was obtained.      He denies a personal or family history of anesthesia complications or bleeding disorders.     Allergies   Allergen Reactions     Cefaclor GI Disturbance     Ciprofloxacin Muscle Pain (Myalgia)     Achilles pain        Prior to Admission Medications   Prescriptions Last Dose Informant Patient Reported? Taking?   acetaminophen (TYLENOL) 500 MG tablet   Yes No   Sig: Take 1,000 mg by mouth 2 times daily as needed for mild pain      Facility-Administered Medications: None       Patient Active Problem List   Diagnosis     Alcohol use disorder, severe, dependence (H)     Hepatic steatosis     Icterus     Sigmoid diverticulitis     Elevated bilirubin     Perforation of sigmoid colon due to diverticulitis     Personal history of tobacco use, presenting hazards to health     Colonic diverticular abscess        History reviewed. No pertinent past medical history.     History reviewed. No pertinent surgical history.    Social History     Tobacco Use     Smoking status: Every Day     Types: Cigars, Other     Start date: 1/1/2015     Smokeless tobacco: Never     Tobacco comments:     Vape   Substance Use Topics     Alcohol use: Not Currently     Comment: 2-3 drinks a night - since 2016       Family History   Problem Relation Age of Onset  "    Liver Disease Mother 60        due to ETOH     Kidney Disease Mother      No Known Problems Father      Other - See Comments Half-Sister         falling accident     Colon Cancer No family hx of        REVIEW OF SYSTEMS:     5 point ROS negative except as noted above in HPI, including Gen., Resp., CV, GI &  system review.      PHYSICAL EXAM:   There were no vitals taken for this visit. Estimated body mass index is 33.64 kg/m  as calculated from the following:    Height as of 2/10/23: 1.772 m (5' 9.75\").    Weight as of 2/10/23: 105.6 kg (232 lb 12.8 oz).   GENERAL APPEARANCE: healthy and alert  MENTAL STATUS: alert  AIRWAY EXAM: Mallampatti Class II (visualization of the soft palate, fauces, and uvula)  RESP: lungs clear to auscultation - no rales, rhonchi or wheezes  CV: regular rates and rhythm      DIAGNOSTICS:    Not indicated      IMPRESSION   ASA Class 2 - Mild systemic disease        PLAN:       Plan for colonoscopy. We discussed the risks, benefits and alternatives and the patient wished to proceed.    The above has been forwarded to the consulting provider.      Signed Electronically by: Colette White MD, MD  February 13, 2023    "

## 2023-02-14 ENCOUNTER — HOSPITAL ENCOUNTER (INPATIENT)
Facility: CLINIC | Age: 33
LOS: 3 days | Discharge: HOME | End: 2023-02-17
Attending: COLON & RECTAL SURGERY | Admitting: COLON & RECTAL SURGERY
Payer: COMMERCIAL

## 2023-02-14 ENCOUNTER — ANESTHESIA EVENT (OUTPATIENT)
Dept: SURGERY | Facility: CLINIC | Age: 33
End: 2023-02-14
Payer: COMMERCIAL

## 2023-02-14 ENCOUNTER — ANESTHESIA (OUTPATIENT)
Dept: SURGERY | Facility: CLINIC | Age: 33
End: 2023-02-14
Payer: COMMERCIAL

## 2023-02-14 DIAGNOSIS — K57.92 DIVERTICULITIS: Primary | ICD-10-CM

## 2023-02-14 LAB
CREAT SERPL-MCNC: 1.14 MG/DL (ref 0.67–1.17)
GFR SERPL CREATININE-BSD FRML MDRD: 88 ML/MIN/1.73M2
HIV 1+2 AB+HIV1P24 AG SERPLBLD IA.RAPID: NON REACTIVE
HIV 1+2 AB+HIV1P24 AG SERPLBLD IA.RAPID: NON REACTIVE
HIV INTERPRETATION: NORMAL
PATH REPORT.COMMENTS IMP SPEC: NORMAL
PATH REPORT.COMMENTS IMP SPEC: NORMAL
PATH REPORT.FINAL DX SPEC: NORMAL
PATH REPORT.GROSS SPEC: NORMAL
PATH REPORT.MICROSCOPIC SPEC OTHER STN: NORMAL
PATH REPORT.RELEVANT HX SPEC: NORMAL
PHOTO IMAGE: NORMAL

## 2023-02-14 PROCEDURE — 250N000013 HC RX MED GY IP 250 OP 250 PS 637: Performed by: COLON & RECTAL SURGERY

## 2023-02-14 PROCEDURE — 8E0W4CZ ROBOTIC ASSISTED PROCEDURE OF TRUNK REGION, PERCUTANEOUS ENDOSCOPIC APPROACH: ICD-10-PCS | Performed by: COLON & RECTAL SURGERY

## 2023-02-14 PROCEDURE — 88307 TISSUE EXAM BY PATHOLOGIST: CPT | Mod: TC | Performed by: COLON & RECTAL SURGERY

## 2023-02-14 PROCEDURE — 999N000104 HIV RAPID ANTIBODY SCREEN: Performed by: INTERNAL MEDICINE

## 2023-02-14 PROCEDURE — 250N000011 HC RX IP 250 OP 636: Performed by: ANESTHESIOLOGY

## 2023-02-14 PROCEDURE — 258N000003 HC RX IP 258 OP 636: Performed by: ANESTHESIOLOGY

## 2023-02-14 PROCEDURE — 360N000080 HC SURGERY LEVEL 7, PER MIN: Performed by: COLON & RECTAL SURGERY

## 2023-02-14 PROCEDURE — 36415 COLL VENOUS BLD VENIPUNCTURE: CPT | Performed by: INTERNAL MEDICINE

## 2023-02-14 PROCEDURE — 0DBN4ZZ EXCISION OF SIGMOID COLON, PERCUTANEOUS ENDOSCOPIC APPROACH: ICD-10-PCS | Performed by: COLON & RECTAL SURGERY

## 2023-02-14 PROCEDURE — 82565 ASSAY OF CREATININE: CPT | Performed by: COLON & RECTAL SURGERY

## 2023-02-14 PROCEDURE — 250N000011 HC RX IP 250 OP 636: Performed by: NURSE ANESTHETIST, CERTIFIED REGISTERED

## 2023-02-14 PROCEDURE — 36415 COLL VENOUS BLD VENIPUNCTURE: CPT | Performed by: COLON & RECTAL SURGERY

## 2023-02-14 PROCEDURE — 88307 TISSUE EXAM BY PATHOLOGIST: CPT | Mod: 26 | Performed by: PATHOLOGY

## 2023-02-14 PROCEDURE — 120N000001 HC R&B MED SURG/OB

## 2023-02-14 PROCEDURE — 250N000009 HC RX 250: Performed by: NURSE ANESTHETIST, CERTIFIED REGISTERED

## 2023-02-14 PROCEDURE — 250N000011 HC RX IP 250 OP 636: Performed by: COLON & RECTAL SURGERY

## 2023-02-14 PROCEDURE — 0DJD8ZZ INSPECTION OF LOWER INTESTINAL TRACT, VIA NATURAL OR ARTIFICIAL OPENING ENDOSCOPIC: ICD-10-PCS | Performed by: COLON & RECTAL SURGERY

## 2023-02-14 PROCEDURE — 999N000141 HC STATISTIC PRE-PROCEDURE NURSING ASSESSMENT: Performed by: COLON & RECTAL SURGERY

## 2023-02-14 PROCEDURE — 710N000009 HC RECOVERY PHASE 1, LEVEL 1, PER MIN: Performed by: COLON & RECTAL SURGERY

## 2023-02-14 PROCEDURE — 250N000025 HC SEVOFLURANE, PER MIN: Performed by: COLON & RECTAL SURGERY

## 2023-02-14 PROCEDURE — 250N000009 HC RX 250: Performed by: COLON & RECTAL SURGERY

## 2023-02-14 PROCEDURE — 272N000001 HC OR GENERAL SUPPLY STERILE: Performed by: COLON & RECTAL SURGERY

## 2023-02-14 PROCEDURE — 258N000001 HC RX 258: Performed by: COLON & RECTAL SURGERY

## 2023-02-14 PROCEDURE — 370N000017 HC ANESTHESIA TECHNICAL FEE, PER MIN: Performed by: COLON & RECTAL SURGERY

## 2023-02-14 RX ORDER — NALOXONE HYDROCHLORIDE 0.4 MG/ML
0.4 INJECTION, SOLUTION INTRAMUSCULAR; INTRAVENOUS; SUBCUTANEOUS
Status: DISCONTINUED | OUTPATIENT
Start: 2023-02-14 | End: 2023-02-17 | Stop reason: HOSPADM

## 2023-02-14 RX ORDER — SODIUM CHLORIDE, SODIUM LACTATE, POTASSIUM CHLORIDE, CALCIUM CHLORIDE 600; 310; 30; 20 MG/100ML; MG/100ML; MG/100ML; MG/100ML
INJECTION, SOLUTION INTRAVENOUS CONTINUOUS
Status: DISCONTINUED | OUTPATIENT
Start: 2023-02-14 | End: 2023-02-14 | Stop reason: HOSPADM

## 2023-02-14 RX ORDER — LIDOCAINE HYDROCHLORIDE 10 MG/ML
INJECTION, SOLUTION INFILTRATION; PERINEURAL PRN
Status: DISCONTINUED | OUTPATIENT
Start: 2023-02-14 | End: 2023-02-14

## 2023-02-14 RX ORDER — ONDANSETRON 2 MG/ML
4 INJECTION INTRAMUSCULAR; INTRAVENOUS EVERY 30 MIN PRN
Status: DISCONTINUED | OUTPATIENT
Start: 2023-02-14 | End: 2023-02-14 | Stop reason: HOSPADM

## 2023-02-14 RX ORDER — ONDANSETRON 4 MG/1
4 TABLET, ORALLY DISINTEGRATING ORAL EVERY 6 HOURS PRN
Status: DISCONTINUED | OUTPATIENT
Start: 2023-02-14 | End: 2023-02-17 | Stop reason: HOSPADM

## 2023-02-14 RX ORDER — HEPARIN SODIUM 5000 [USP'U]/.5ML
5000 INJECTION, SOLUTION INTRAVENOUS; SUBCUTANEOUS
Status: COMPLETED | OUTPATIENT
Start: 2023-02-14 | End: 2023-02-14

## 2023-02-14 RX ORDER — PROPOFOL 10 MG/ML
INJECTION, EMULSION INTRAVENOUS PRN
Status: DISCONTINUED | OUTPATIENT
Start: 2023-02-14 | End: 2023-02-14

## 2023-02-14 RX ORDER — FENTANYL CITRATE 50 UG/ML
50 INJECTION, SOLUTION INTRAMUSCULAR; INTRAVENOUS EVERY 5 MIN PRN
Status: DISCONTINUED | OUTPATIENT
Start: 2023-02-14 | End: 2023-02-14 | Stop reason: HOSPADM

## 2023-02-14 RX ORDER — ONDANSETRON 2 MG/ML
4 INJECTION INTRAMUSCULAR; INTRAVENOUS EVERY 6 HOURS PRN
Status: DISCONTINUED | OUTPATIENT
Start: 2023-02-14 | End: 2023-02-17 | Stop reason: HOSPADM

## 2023-02-14 RX ORDER — ALVIMOPAN 12 MG/1
12 CAPSULE ORAL ONCE
Status: COMPLETED | OUTPATIENT
Start: 2023-02-14 | End: 2023-02-14

## 2023-02-14 RX ORDER — NALOXONE HYDROCHLORIDE 0.4 MG/ML
0.2 INJECTION, SOLUTION INTRAMUSCULAR; INTRAVENOUS; SUBCUTANEOUS
Status: DISCONTINUED | OUTPATIENT
Start: 2023-02-14 | End: 2023-02-17 | Stop reason: HOSPADM

## 2023-02-14 RX ORDER — HYDROMORPHONE HCL IN WATER/PF 6 MG/30 ML
0.4 PATIENT CONTROLLED ANALGESIA SYRINGE INTRAVENOUS
Status: DISCONTINUED | OUTPATIENT
Start: 2023-02-14 | End: 2023-02-17 | Stop reason: HOSPADM

## 2023-02-14 RX ORDER — OXYCODONE HYDROCHLORIDE 5 MG/1
5 TABLET ORAL EVERY 4 HOURS PRN
Status: DISCONTINUED | OUTPATIENT
Start: 2023-02-14 | End: 2023-02-17 | Stop reason: HOSPADM

## 2023-02-14 RX ORDER — MAGNESIUM HYDROXIDE 1200 MG/15ML
LIQUID ORAL PRN
Status: DISCONTINUED | OUTPATIENT
Start: 2023-02-14 | End: 2023-02-14 | Stop reason: HOSPADM

## 2023-02-14 RX ORDER — ENOXAPARIN SODIUM 100 MG/ML
40 INJECTION SUBCUTANEOUS EVERY 24 HOURS
Status: DISCONTINUED | OUTPATIENT
Start: 2023-02-15 | End: 2023-02-17 | Stop reason: HOSPADM

## 2023-02-14 RX ORDER — FENTANYL CITRATE 50 UG/ML
25 INJECTION, SOLUTION INTRAMUSCULAR; INTRAVENOUS EVERY 5 MIN PRN
Status: DISCONTINUED | OUTPATIENT
Start: 2023-02-14 | End: 2023-02-14 | Stop reason: HOSPADM

## 2023-02-14 RX ORDER — ACETAMINOPHEN 325 MG/1
650 TABLET ORAL EVERY 4 HOURS PRN
Status: DISCONTINUED | OUTPATIENT
Start: 2023-02-17 | End: 2023-02-17 | Stop reason: HOSPADM

## 2023-02-14 RX ORDER — OXYCODONE HYDROCHLORIDE 5 MG/1
10 TABLET ORAL EVERY 4 HOURS PRN
Status: DISCONTINUED | OUTPATIENT
Start: 2023-02-14 | End: 2023-02-17 | Stop reason: HOSPADM

## 2023-02-14 RX ORDER — ALVIMOPAN 12 MG/1
12 CAPSULE ORAL 2 TIMES DAILY
Status: DISCONTINUED | OUTPATIENT
Start: 2023-02-15 | End: 2023-02-17 | Stop reason: HOSPADM

## 2023-02-14 RX ORDER — ONDANSETRON 4 MG/1
4 TABLET, ORALLY DISINTEGRATING ORAL EVERY 30 MIN PRN
Status: DISCONTINUED | OUTPATIENT
Start: 2023-02-14 | End: 2023-02-14 | Stop reason: HOSPADM

## 2023-02-14 RX ORDER — FENTANYL CITRATE 50 UG/ML
INJECTION, SOLUTION INTRAMUSCULAR; INTRAVENOUS PRN
Status: DISCONTINUED | OUTPATIENT
Start: 2023-02-14 | End: 2023-02-14

## 2023-02-14 RX ORDER — PROPOFOL 10 MG/ML
INJECTION, EMULSION INTRAVENOUS CONTINUOUS PRN
Status: DISCONTINUED | OUTPATIENT
Start: 2023-02-14 | End: 2023-02-14

## 2023-02-14 RX ORDER — ACETAMINOPHEN 325 MG/1
975 TABLET ORAL EVERY 8 HOURS
Status: COMPLETED | OUTPATIENT
Start: 2023-02-14 | End: 2023-02-17

## 2023-02-14 RX ORDER — INDOCYANINE GREEN AND WATER 25 MG
KIT INJECTION PRN
Status: DISCONTINUED | OUTPATIENT
Start: 2023-02-14 | End: 2023-02-14

## 2023-02-14 RX ORDER — NEOSTIGMINE METHYLSULFATE 1 MG/ML
VIAL (ML) INJECTION PRN
Status: DISCONTINUED | OUTPATIENT
Start: 2023-02-14 | End: 2023-02-14

## 2023-02-14 RX ORDER — ONDANSETRON 2 MG/ML
INJECTION INTRAMUSCULAR; INTRAVENOUS PRN
Status: DISCONTINUED | OUTPATIENT
Start: 2023-02-14 | End: 2023-02-14

## 2023-02-14 RX ORDER — BUPIVACAINE HYDROCHLORIDE AND EPINEPHRINE 5; 5 MG/ML; UG/ML
INJECTION, SOLUTION PERINEURAL PRN
Status: DISCONTINUED | OUTPATIENT
Start: 2023-02-14 | End: 2023-02-14 | Stop reason: HOSPADM

## 2023-02-14 RX ORDER — LIDOCAINE 40 MG/G
CREAM TOPICAL
Status: DISCONTINUED | OUTPATIENT
Start: 2023-02-14 | End: 2023-02-14 | Stop reason: HOSPADM

## 2023-02-14 RX ORDER — OXYCODONE HYDROCHLORIDE 10 MG/1
10 TABLET ORAL EVERY 4 HOURS PRN
Status: DISCONTINUED | OUTPATIENT
Start: 2023-02-14 | End: 2023-02-14 | Stop reason: HOSPADM

## 2023-02-14 RX ORDER — HYDROMORPHONE HCL IN WATER/PF 6 MG/30 ML
0.2 PATIENT CONTROLLED ANALGESIA SYRINGE INTRAVENOUS
Status: DISCONTINUED | OUTPATIENT
Start: 2023-02-14 | End: 2023-02-17 | Stop reason: HOSPADM

## 2023-02-14 RX ORDER — CEFAZOLIN SODIUM/WATER 2 G/20 ML
2 SYRINGE (ML) INTRAVENOUS SEE ADMIN INSTRUCTIONS
Status: DISCONTINUED | OUTPATIENT
Start: 2023-02-14 | End: 2023-02-14 | Stop reason: HOSPADM

## 2023-02-14 RX ORDER — GLYCOPYRROLATE 0.2 MG/ML
INJECTION, SOLUTION INTRAMUSCULAR; INTRAVENOUS PRN
Status: DISCONTINUED | OUTPATIENT
Start: 2023-02-14 | End: 2023-02-14

## 2023-02-14 RX ORDER — LABETALOL 20 MG/4 ML (5 MG/ML) INTRAVENOUS SYRINGE
PRN
Status: DISCONTINUED | OUTPATIENT
Start: 2023-02-14 | End: 2023-02-14

## 2023-02-14 RX ORDER — LIDOCAINE 40 MG/G
CREAM TOPICAL
Status: DISCONTINUED | OUTPATIENT
Start: 2023-02-14 | End: 2023-02-17 | Stop reason: HOSPADM

## 2023-02-14 RX ORDER — CEFAZOLIN SODIUM/WATER 2 G/20 ML
2 SYRINGE (ML) INTRAVENOUS
Status: COMPLETED | OUTPATIENT
Start: 2023-02-14 | End: 2023-02-14

## 2023-02-14 RX ORDER — SODIUM CHLORIDE, SODIUM LACTATE, POTASSIUM CHLORIDE, CALCIUM CHLORIDE 600; 310; 30; 20 MG/100ML; MG/100ML; MG/100ML; MG/100ML
INJECTION, SOLUTION INTRAVENOUS CONTINUOUS
Status: DISCONTINUED | OUTPATIENT
Start: 2023-02-14 | End: 2023-02-16

## 2023-02-14 RX ORDER — METRONIDAZOLE 500 MG/100ML
500 INJECTION, SOLUTION INTRAVENOUS
Status: COMPLETED | OUTPATIENT
Start: 2023-02-14 | End: 2023-02-14

## 2023-02-14 RX ORDER — OXYCODONE HYDROCHLORIDE 5 MG/1
5 TABLET ORAL EVERY 4 HOURS PRN
Status: DISCONTINUED | OUTPATIENT
Start: 2023-02-14 | End: 2023-02-14 | Stop reason: HOSPADM

## 2023-02-14 RX ORDER — LABETALOL HYDROCHLORIDE 5 MG/ML
10 INJECTION, SOLUTION INTRAVENOUS
Status: DISCONTINUED | OUTPATIENT
Start: 2023-02-14 | End: 2023-02-14 | Stop reason: HOSPADM

## 2023-02-14 RX ORDER — ACETAMINOPHEN 325 MG/1
975 TABLET ORAL ONCE
Status: COMPLETED | OUTPATIENT
Start: 2023-02-14 | End: 2023-02-14

## 2023-02-14 RX ADMIN — HYDROMORPHONE HYDROCHLORIDE 0.4 MG: 0.2 INJECTION, SOLUTION INTRAMUSCULAR; INTRAVENOUS; SUBCUTANEOUS at 22:23

## 2023-02-14 RX ADMIN — GLYCOPYRROLATE 0.2 MG: 0.2 INJECTION, SOLUTION INTRAMUSCULAR; INTRAVENOUS at 12:13

## 2023-02-14 RX ADMIN — MIDAZOLAM 2 MG: 1 INJECTION INTRAMUSCULAR; INTRAVENOUS at 12:08

## 2023-02-14 RX ADMIN — PROPOFOL 50 MCG/KG/MIN: 10 INJECTION, EMULSION INTRAVENOUS at 12:20

## 2023-02-14 RX ADMIN — HYDROMORPHONE HYDROCHLORIDE 0.5 MG: 1 INJECTION, SOLUTION INTRAMUSCULAR; INTRAVENOUS; SUBCUTANEOUS at 14:35

## 2023-02-14 RX ADMIN — HYDROMORPHONE HYDROCHLORIDE 0.4 MG: 0.2 INJECTION, SOLUTION INTRAMUSCULAR; INTRAVENOUS; SUBCUTANEOUS at 19:09

## 2023-02-14 RX ADMIN — ROCURONIUM BROMIDE 20 MG: 50 INJECTION, SOLUTION INTRAVENOUS at 15:44

## 2023-02-14 RX ADMIN — HYDROMORPHONE HYDROCHLORIDE 1 MG: 1 INJECTION, SOLUTION INTRAMUSCULAR; INTRAVENOUS; SUBCUTANEOUS at 13:03

## 2023-02-14 RX ADMIN — INDOCYANINE GREEN AND WATER 7.5 MG: KIT at 15:00

## 2023-02-14 RX ADMIN — SODIUM CHLORIDE, POTASSIUM CHLORIDE, SODIUM LACTATE AND CALCIUM CHLORIDE: 600; 310; 30; 20 INJECTION, SOLUTION INTRAVENOUS at 11:30

## 2023-02-14 RX ADMIN — LIDOCAINE HYDROCHLORIDE 50 MG: 10 INJECTION, SOLUTION INFILTRATION; PERINEURAL at 12:13

## 2023-02-14 RX ADMIN — ONDANSETRON 4 MG: 2 INJECTION INTRAMUSCULAR; INTRAVENOUS at 16:25

## 2023-02-14 RX ADMIN — GLYCOPYRROLATE 0.6 MG: 0.2 INJECTION, SOLUTION INTRAMUSCULAR; INTRAVENOUS at 16:46

## 2023-02-14 RX ADMIN — ROCURONIUM BROMIDE 10 MG: 50 INJECTION, SOLUTION INTRAVENOUS at 13:52

## 2023-02-14 RX ADMIN — LABETALOL 20 MG/4 ML (5 MG/ML) INTRAVENOUS SYRINGE 10 MG: at 13:21

## 2023-02-14 RX ADMIN — METRONIDAZOLE 500 MG: 500 INJECTION, SOLUTION INTRAVENOUS at 11:31

## 2023-02-14 RX ADMIN — ACETAMINOPHEN 975 MG: 325 TABLET ORAL at 11:25

## 2023-02-14 RX ADMIN — ROCURONIUM BROMIDE 20 MG: 50 INJECTION, SOLUTION INTRAVENOUS at 13:15

## 2023-02-14 RX ADMIN — HEPARIN SODIUM 5000 UNITS: 10000 INJECTION, SOLUTION INTRAVENOUS; SUBCUTANEOUS at 11:31

## 2023-02-14 RX ADMIN — FENTANYL CITRATE 50 MCG: 50 INJECTION INTRAMUSCULAR; INTRAVENOUS at 17:46

## 2023-02-14 RX ADMIN — SODIUM CHLORIDE, POTASSIUM CHLORIDE, SODIUM LACTATE AND CALCIUM CHLORIDE: 600; 310; 30; 20 INJECTION, SOLUTION INTRAVENOUS at 16:24

## 2023-02-14 RX ADMIN — NEOSTIGMINE METHYLSULFATE 4 MG: 1 INJECTION, SOLUTION INTRAVENOUS at 16:46

## 2023-02-14 RX ADMIN — ACETAMINOPHEN 975 MG: 325 TABLET, FILM COATED ORAL at 19:08

## 2023-02-14 RX ADMIN — FENTANYL CITRATE 100 MCG: 50 INJECTION, SOLUTION INTRAMUSCULAR; INTRAVENOUS at 12:46

## 2023-02-14 RX ADMIN — ROCURONIUM BROMIDE 20 MG: 50 INJECTION, SOLUTION INTRAVENOUS at 12:46

## 2023-02-14 RX ADMIN — PROPOFOL 200 MG: 10 INJECTION, EMULSION INTRAVENOUS at 12:13

## 2023-02-14 RX ADMIN — FENTANYL CITRATE 150 MCG: 50 INJECTION, SOLUTION INTRAMUSCULAR; INTRAVENOUS at 12:13

## 2023-02-14 RX ADMIN — Medication 2 G: at 12:08

## 2023-02-14 RX ADMIN — ALVIMOPAN 12 MG: 12 CAPSULE ORAL at 11:25

## 2023-02-14 RX ADMIN — Medication 2 G: at 16:08

## 2023-02-14 RX ADMIN — HYDROMORPHONE HYDROCHLORIDE 0.2 MG: 1 INJECTION, SOLUTION INTRAMUSCULAR; INTRAVENOUS; SUBCUTANEOUS at 18:08

## 2023-02-14 RX ADMIN — FENTANYL CITRATE 50 MCG: 50 INJECTION INTRAMUSCULAR; INTRAVENOUS at 17:28

## 2023-02-14 RX ADMIN — ROCURONIUM BROMIDE 50 MG: 50 INJECTION, SOLUTION INTRAVENOUS at 12:13

## 2023-02-14 RX ADMIN — HYDROMORPHONE HYDROCHLORIDE 0.5 MG: 1 INJECTION, SOLUTION INTRAMUSCULAR; INTRAVENOUS; SUBCUTANEOUS at 14:25

## 2023-02-14 ASSESSMENT — ACTIVITIES OF DAILY LIVING (ADL)
ADLS_ACUITY_SCORE: 18

## 2023-02-14 ASSESSMENT — ENCOUNTER SYMPTOMS: DYSRHYTHMIAS: 0

## 2023-02-14 ASSESSMENT — LIFESTYLE VARIABLES: TOBACCO_USE: 1

## 2023-02-14 NOTE — INTERVAL H&P NOTE
"I have reviewed the surgical (or preoperative) H&P that is linked to this encounter, and examined the patient. There are no significant changes    Clinical Conditions Present on Arrival:  Clinically Significant Risk Factors Present on Admission                    # Obesity: Estimated body mass index is 33.64 kg/m  as calculated from the following:    Height as of 2/10/23: 1.772 m (5' 9.75\").    Weight as of 2/10/23: 105.6 kg (232 lb 12.8 oz).       "

## 2023-02-14 NOTE — BRIEF OP NOTE
[facility]    Brief Operative Note    Pre-operative diagnosis: Diverticulitis [K57.92]  Post-operative diagnosis diverticulitis    Procedure: Procedure(s):  Xi Robotic Assisted Sigmoid Colon Resection  Surgeon: Surgeon(s) and Role:     * Colette White MD - Primary     * Anita Castañeda PA-C - Assisting  Anesthesia: General   Estimated Blood Loss: 20 mL from 2/14/2023 12:12 PM to 2/14/2023  5:03 PM      Drains: None  Specimens:   ID Type Source Tests Collected by Time Destination   1 : Sigmoid Colon - staple line is distal -  with anastomotic rings Tissue Large Intestine, Colon, Sigmoid SURGICAL PATHOLOGY EXAM Colette White MD 2/14/2023  4:41 PM      Findings:   Expected anatomy.  Complications: None.  Implants: * No implants in log *        Condition on discharge from OR: Satisfactory    Anita Castañeda PA-C   [facility]   [phone].        ADDENDUM:    PATIENT DATA  Indicate Y or N:  Home O2 No  Hemodialysis  No  Transplant patient  No  Cirrhosis  No  Steroids in last 30 days  No  Immunomodulators in last 30 days  No  Anticoagulation at time of surgery  No   List medication N/A  Prior abdominal surgery  No  Pelvic irradiation  No    Albumin within 30 days if known N/A   Hgb within 30 days if known    Hemoglobin   Date Value Ref Range Status   02/10/2023 17.1 13.3 - 17.7 g/dL Final   ]  Cr within 30 days if known    Creatinine   Date Value Ref Range Status   02/10/2023 1.04 0.67 - 1.17 mg/dL Final   ]  Body mass index is 33.72 kg/m .      OR DATA  Emergent  No   <24 hours  No   <1 week  No  Bowel Prep Yes  Antibiotics  Yes  DVT prophylaxis    Heparin  Yes   SCD  Yes   None  No  Drain  No  ASA (1,2,3,4) 3  OR time (min) 273  Stents  No  Transfuse >/= 2U  No  Anastomosis   Stapled  Yes   Handsewn  No  Leak Test    Positive  No   Negative  Yes   Not done  No

## 2023-02-14 NOTE — ANESTHESIA PREPROCEDURE EVALUATION
Anesthesia Pre-Procedure Evaluation    Patient: Tony Bonilla   MRN: [id number] : 1990        Procedure : Procedure(s):  Xi Robotic Assisted Sigmoid Colon Resection          History reviewed. No pertinent past medical history.   Past Surgical History:   Procedure Laterality Date     COLONOSCOPY N/A 2023    Procedure: COLONOSCOPY, FLEXIBLE, WITH polypectomy using cold snare;  Surgeon: Colette White MD;  Location: [facility]      Allergies   Allergen Reactions     Cefaclor GI Disturbance     Ciprofloxacin Muscle Pain (Myalgia)     Achilles pain      Social History     Tobacco Use     Smoking status: Every Day     Types: Cigars, Other     Start date: 2015     Smokeless tobacco: Never     Tobacco comments:     Vape   Substance Use Topics     Alcohol use: Not Currently     Comment: 2-3 drinks a night - since       Wt Readings from Last 1 Encounters:   02/10/23 105.6 kg (232 lb 12.8 oz)        Anesthesia Evaluation            ROS/MED HX  ENT/Pulmonary:     (+) tobacco use,     Neurologic:  - neg neurologic ROS     Cardiovascular:    (-) hypertension, CAD, CHF, arrhythmias, pulmonary hypertension and dyslipidemia   METS/Exercise Tolerance:     Hematologic:    (-) anemia   Musculoskeletal:    (-) arthritis   GI/Hepatic:     (+) hepatitis type Alcoholic, liver disease,  (-) GERD   Renal/Genitourinary:       Endo:  - neg endo ROS     Psychiatric/Substance Use:     (+) alcohol abuse     Infectious Disease:  - neg infectious disease ROS     Malignancy:       Other:            Physical Exam    Airway        Mallampati: II   TM distance: > 3 FB   Neck ROM: full   Mouth opening: > 3 cm    Respiratory Devices and Support         Dental           Cardiovascular   cardiovascular exam normal          Pulmonary   pulmonary exam normal            Other findings: Lab Test        02/10/23     11/29/22     11/25/22                       1405          1211          1043          WBC          8.8          12.9*         17.0*         HGB          17.1         14.8         14.4          MCV          91           99           98            PLT          317          442          502*           Lab Test        02/10/23     11/25/22     11/22/22     11/21/22     11/20/22                       1405          1043          0752          0757          0508          NA           141          140           --           --          140           POTASSIUM    3.7          4.1          4.3            < >        4.1           CHLORIDE     102          105           --           --          102           CO2          23           24            --           --          28            BUN          8.4          7.6           --           --          4.7*          CR           1.04         0.86          --           --          1.09          ANIONGAP     16*          11            --           --          10            HERI          9.7          9.0           --           --          9.0           GLC          106*         105*          --           --          98             < > = values in this interval not displayed.                   OUTSIDE LABS:  CBC:   Lab Results   Component Value Date    WBC 8.8 02/10/2023    WBC 12.9 (H) 11/29/2022    HGB 17.1 02/10/2023    HGB 14.8 11/29/2022    HCT 47.5 02/10/2023    HCT 45.0 11/29/2022     02/10/2023     11/29/2022     BMP:   Lab Results   Component Value Date     02/10/2023     11/25/2022    POTASSIUM 3.7 02/10/2023    POTASSIUM 4.1 11/25/2022    CHLORIDE 102 02/10/2023    CHLORIDE 105 11/25/2022    CO2 23 02/10/2023    CO2 24 11/25/2022    BUN 8.4 02/10/2023    BUN 7.6 11/25/2022    CR 1.04 02/10/2023    CR 0.86 11/25/2022     (H) 02/10/2023     (H) 11/25/2022     COAGS: No results found for: PTT, INR, FIBR  POC: No results found for: BGM, HCG, HCGS  HEPATIC:   Lab Results   Component Value Date    ALBUMIN 4.8 02/10/2023    PROTTOTAL 7.5 02/10/2023    ALT 87 (H)  02/10/2023    AST 59 (H) 02/10/2023     (H) 11/25/2022    ALKPHOS 82 02/10/2023    BILITOTAL 2.0 (H) 02/10/2023     OTHER:   Lab Results   Component Value Date    LACT 0.5 (L) 11/18/2022    HERI 9.7 02/10/2023    MAG 2.3 11/22/2022    LIPASE 18 10/31/2022    SED 9 10/31/2022       Anesthesia Plan    ASA Status:  3      Anesthesia Type: General.     - Airway: ETT   Induction: Propofol.   Maintenance: Balanced.        Consents    Anesthesia Plan(s) and associated risks, benefits, and realistic alternatives discussed. Questions answered and patient/representative(s) expressed understanding.    - Discussed:     - Discussed with:  Patient      - Extended Intubation/Ventilatory Support Discussed: No.      - Patient is DNR/DNI Status: No    Use of blood products discussed: Yes.     - Discussed with: Patient.     - Consented: consented to blood products            Reason for refusal: other.     Postoperative Care    Pain management: IV analgesics, Oral pain medications.   PONV prophylaxis: Ondansetron (or other 5HT-3), Background Propofol Infusion     Comments:                Rahul Del Real MD

## 2023-02-14 NOTE — PHARMACY-ADMISSION MEDICATION HISTORY
Medication history and patient interview completed by pharmacy intern/student or pre-admitting RN.  Reviewed by pharmacist, including [employer] dispense records, [facility] Care Everywhere, and chart review.       Carlos Miller, Pharm.D., BCPS      Prior to Admission medications    Medication Sig Last Dose Taking? Auth Provider Long Term End Date   acetaminophen (TYLENOL) 500 MG tablet Take 1,000 mg by mouth 2 times daily as needed for mild pain More than a month Yes Unknown, Entered By History

## 2023-02-14 NOTE — ANESTHESIA CARE TRANSFER NOTE
Patient: Tony Bonilla    Procedure: Procedure(s):  Xi Robotic Assisted Sigmoid Colon Resection       Diagnosis: Diverticulitis [K57.92]  Diagnosis Additional Information: No value filed.    Anesthesia Type:   General     Note:    Oropharynx: oropharynx clear of all foreign objects and spontaneously breathing  Level of Consciousness: awake  Oxygen Supplementation: face mask  Level of Supplemental Oxygen (L/min / FiO2): 6  Independent Airway: airway patency satisfactory and stable  Dentition: dentition unchanged  Vital Signs Stable: post-procedure vital signs reviewed and stable  Report to RN Given: handoff report given  Patient transferred to: PACU    Handoff Report: Identifed the Patient, Identified the Reponsible Provider, Reviewed the pertinent medical history, Discussed the surgical course, Reviewed Intra-OP anesthesia mangement and issues during anesthesia, Set expectations for post-procedure period and Allowed opportunity for questions and acknowledgement of understanding      Vitals:  Vitals Value Taken Time   /105 02/14/23 1705   Temp 98.1  F (36.7  C) 02/14/23 1705   Pulse 86 02/14/23 1707   Resp 13 02/14/23 1707   SpO2 82 % 02/14/23 1707   Vitals shown include unvalidated device data.    Electronically Signed By: MICHEAL Turcios CRNA  February 14, 2023  5:09 PM

## 2023-02-14 NOTE — OP NOTE
Operative Report    Pre Operative Diagnosis:  1)  Sigmoid Diverticulitis with history of abscess      Post Operative Diagnosis  1) Sigmoid diverticulitis     Surgery:  1)  Robotic sigmoid colectomy  2)  Laparoscopic mobilization of splenic flexure  3)  Intraoperative fluorescence angiography  4)  Colorectal anastomosis  5)  Rigid proctoscopy    Surgeon: Colette White MD  Assistant: MARISEL Holland  Second Assistant: Gracie Muniz [facility]    Anesthesia: General, Laparoscopic tap block with 30cc of 0.5% Marcaine with epinephrine.     EBL: 20    Findings: The sigmoid colon was thickened and firm consistent with diverticular disease.  There was no evidence of abscess or perforation.  There were adhesions of the sigmoid colon to the left pelvic side wall and a thickened sigmoid colon mesentery.  I performed a lateral to medial mobilization of the sigmoid colon with full mobilization of the splenic flexure.  The distal transection was within the upper rectum and we created an end to end stapled anastomosis at 16 centimeters from the anal verge. The anastomosis was created with excellent blood supply on fluorescence angiography and was without tension.  A pneumatic leak test of the anastomosis was negative for any extravasation of air.      Indication: Tony Bonilla is a 32 year old man who has a history of sigmoid diverticulitis on several occasions with a mesenteric abscess documented by CT scan.  We discussed continued conservative measures versus robotic sigmoid resection.  He was advised of the risk, benefits, and alternatives of surgery.  The risks include but are not limited to the risk of bleeding, anastomotic leak, wound infection, injury to adjacent structures, development of a hernia, need for further surgery including creating a stoma, and even more serious complications.  We discussed expectations regarding recovery.  He wishes to proceed.    Description of  procedure:  After obtaining informed consent the patient was brought to the operating room after a complete bowel prep.  He was placed on the operating table and general anesthesia was induced.  He was intubated by the anesthesia service without difficulty.      Zavala catheter was placed under sterile conditions.  An orogastric tube was placed.  The patient's arms were tucked at his side.  He was secured to the table with tape across the chest.  He was placed in the low modified lithotomy position.  All pressure points were inspected and padded appropriately.  The abdomen was shaved prepped and draped in the usual sterile fashion.  A timeout was undertaken which identified the patient and the correct procedure.    We began by making a stab incision in the left upper quadrant of the abdomen.  A Veres needle was used to establish pneumoperitoneum.  Used a 5-30 scope and the 5 air seal with direct visualization through a right upper quadrant incision.  A small incision was made in the supraumbilical position.  An 8 mm robotic trocar was placed here without difficulty.  We then surveyed the abdomen with 30 degree angled robotic camera.  There is no evidence of injury to the underlying viscera and the Veres needle was removed.  The abdomen was surveyed and there is no evidence of other pathology.  We then performed a laparoscopic-assisted TAP block utilizing #30 mL of half percent Marcaine with epinephrine.  This was injected in four aliquots; one for each quadrant of the abdomen.    We placed 2 additional 8 mm trochars on the left side of the abdomen running in the line across the abdomen transversely.  We placed an additional 12 mm robotic trocar in the right lower quadrant.  The air seal device was used to maintain pneumoperitoneum throughout the case.    Laparoscopically we incised the line of Toldt and mobilized the left colon of the retroperitoneum up to and around the splenic flexure. The omentum was taken off the  distal transverse colon.  Once the left colon was mobalized, we placed the patient in steep Trendelenburg position. We docked the robot over the patient's left side and centered the robot on the inferior mesenteric artery.  We utilized a small grasping retractor and arm #1, tip up grasper in arm #2, the camera and arm #3, and a monopolar scissors and arm #4.  Arm #4 was switched out to the vessel sealing device, and a robotic stapler as needed.  With this instrumentation we performed a mobilization of the colon.    We performed a lateral to medial mobilization by the utilizing the monopolar scissors to incise the lateral attachments of the colon to the pelvic sidewall incising along the white line at Toldt and elevating the colon and its mesentery off the retroperitoneum the left ureter was identified and swept posteriorly and kept out of harm's way.  The mobilization was taken from the sacral promontory up to join the previous splenic flexure dissection plane.  Once this was accomplished we incised the peritoneum medial to the inferior mesenteric artery and it was elevated off the retroperitoneum the left ureter was again identified and the inferior mesenteric artery was isolated.  It was triply ligated with the vessel sealing device and transected with excellent hemostasis.  The adjacent vein was similarly ligated and transected with the vessel sealing device with excellent hemostasis.  The adjacent bare area of the mesentery was then transected with the vessel sealing device as well.    We then turned our attention back to the pelvis.  At this time a point for distal transection was chosen.  The upper rectum was mobilized by incising the areolar attachments of the mesial rectum to the presacral fascia.  The peritoneum along the upper rectum was incised to allow mobilization of the upper rectum.  We then incised the peritoneum of the upper mesorectum up to the level of the bowel wall at the point chosen for distal  "transection.  The mesorectum here was then transected with the vessel sealing device to circumferentially isolate the rectum at this level.     Since we still had the vessel sealer in place we turned our attention more proximally and identified the vascular pedicle.  We divided the mesentery perpendicular to the bowel just cephalad to the vascular pedicle to soft compliant proximal sigmoid colon.  Having done so we could see this easily reached down into the upper pelvis.  We turned our attention back down towards the distal transection site, and transected the bowel at this level with a single firing of a 60 mm green load robotic ILEANA stapler.  3 mL of dilute indocyanine green was injected intravenously by the anesthesia team.  We utilized the Firefly technology on the robotic laparoscope to identify blood flow.  We identified excellent perfusion of the bowel to the entire conduit and upper rectum.      At this point a grasper was placed on the staple line and the robot instruments were removed and the robot was undocked.  The patient was placed in a more neutral position.  We made a small Pfannenstiel incision approximately 3 fingerbreadths above the pubic symphysis dissection was taken down with electrocautery the fascia was incised in a transverse fashion and then elevated off the rectus muscles superiorly and inferiorly.  The muscles fibers of the rectus were spread in the midline and the peritoneum was incised with great care to avoid injury to the bladder.  An Daquan wound retractor was used to facilitate exposure.  This allowed us to extract the proximal staple line and the specimen through a Pfannenstiel incision.  With the bowel nicely eviscerated and our proximal transection identified. The bowel was then divided between Scout clamps.  Specimen was sent off the field labeled as \"sigmoid colon staple line distal\".      We then prepared the proximal bowel for anastomosis.  We could see that the bowel was " soft and compliant and the area where we intended to staple.  A 2-0 Prolene suture was then placed in a pursestring fashion around the cut edge of the bowel wall.  The anvil of a 29 mm EEA stapler was then secured within the lumen of the bowel and the suture was tied down to bring the bowel wall type to the shaft of the anvil.  Fat was cleared of the adjacent bowel wall close to the anvil and a second pursestring suture of the 2-0 Prolene was placed to secure the anvil.  This was then reintroduced into the abdomen.  The Daquan was used to achieve pneumoperitoneum by twisting it and occluding it with a clamp.    The patient was then again placed in Trendelenburg position.  We utilized the robotic camera as a laparoscope.  The proximal bowel of the descending colon was identified and the anvil was identified. Next the small bowel was swept up out of the pelvis.  My assistant then went to the perineal field.  Under my direction sizers were placed via the anus to the top of the rectal stump.  The EEA stapler was then placed via the anus to the top of the rectal stump.  Stapler was flush with the bowel wall here and the spike was deployed through the bowel wall just adjacent to the staple line in its midportion.  Utilizing laparoscopic instruments the anvil was coupled to the stapler. The stapler was closed and fired after ensuring that the proximal bowel was in proper orientation by inspecting the cut edge of the mesentery to its origin.  The stapler was easily retrieved.  The anastomotic rings were inspected and found to be intact.  The pelvis was filled with saline for a pneumatic leak test.  My assistant performed a rigid proctoscopy and a pneumatic leak test was negative.    We closed the right lower quadrant 12 mm trocar site with a Dhaval-Xavier device utilizing 0 Vicryl tie. We switched to a clan closure tray with clean gloves. The Daquan wound retractor was removed and the peritoneum was reapproximated with a  running suture of 3-0 Vicryl.  The fascia was then reapproximated at the site with 2 running sutures of looped 0 PDS. Each layer was irrigated with saline. All skin wounds closed with 4-0 Monocryl in a subcuticular fashion after irrigation.  Exofin was placed as a dressing.  The patient was placed in supine position, drapes were taken down, he was awakened extubated and transferred to the PACU in stable condition.  Lap, sponge, instrument and needle counts correct at the end of the case x2.    Colette White MD  [facility]  [id number] [place] [address], [address]  [place] [address]  T: [phone]  F: [phone]   [email or web address]

## 2023-02-15 LAB
ANION GAP SERPL CALCULATED.3IONS-SCNC: 14 MMOL/L (ref 7–15)
BUN SERPL-MCNC: 6.2 MG/DL (ref 6–20)
CALCIUM SERPL-MCNC: 8.4 MG/DL (ref 8.6–10)
CHLORIDE SERPL-SCNC: 102 MMOL/L (ref 98–107)
CREAT SERPL-MCNC: 0.96 MG/DL (ref 0.67–1.17)
DEPRECATED HCO3 PLAS-SCNC: 22 MMOL/L (ref 22–29)
GFR SERPL CREATININE-BSD FRML MDRD: >90 ML/MIN/1.73M2
GLUCOSE SERPL-MCNC: 89 MG/DL (ref 70–99)
HBV SURFACE AG SERPL QL IA: NONREACTIVE
HGB BLD-MCNC: 15.2 G/DL (ref 13.3–17.7)
HIV 1+2 AB+HIV1 P24 AG SERPL QL IA: NONREACTIVE
HOLD SPECIMEN: NORMAL
PLATELET # BLD AUTO: 263 10E3/UL (ref 150–450)
POTASSIUM SERPL-SCNC: 4.2 MMOL/L (ref 3.4–5.3)
SODIUM SERPL-SCNC: 138 MMOL/L (ref 136–145)

## 2023-02-15 PROCEDURE — 36415 COLL VENOUS BLD VENIPUNCTURE: CPT | Performed by: COLON & RECTAL SURGERY

## 2023-02-15 PROCEDURE — 85049 AUTOMATED PLATELET COUNT: CPT | Performed by: COLON & RECTAL SURGERY

## 2023-02-15 PROCEDURE — 120N000001 HC R&B MED SURG/OB

## 2023-02-15 PROCEDURE — 250N000011 HC RX IP 250 OP 636: Performed by: PHYSICIAN ASSISTANT

## 2023-02-15 PROCEDURE — 85018 HEMOGLOBIN: CPT | Performed by: COLON & RECTAL SURGERY

## 2023-02-15 PROCEDURE — 80048 BASIC METABOLIC PNL TOTAL CA: CPT | Performed by: COLON & RECTAL SURGERY

## 2023-02-15 PROCEDURE — 250N000013 HC RX MED GY IP 250 OP 250 PS 637: Performed by: COLON & RECTAL SURGERY

## 2023-02-15 PROCEDURE — 250N000011 HC RX IP 250 OP 636: Performed by: COLON & RECTAL SURGERY

## 2023-02-15 RX ORDER — METHOCARBAMOL 500 MG/1
500 TABLET, FILM COATED ORAL 4 TIMES DAILY PRN
Status: DISCONTINUED | OUTPATIENT
Start: 2023-02-15 | End: 2023-02-17 | Stop reason: HOSPADM

## 2023-02-15 RX ORDER — KETOROLAC TROMETHAMINE 15 MG/ML
15 INJECTION, SOLUTION INTRAMUSCULAR; INTRAVENOUS EVERY 6 HOURS PRN
Status: DISCONTINUED | OUTPATIENT
Start: 2023-02-15 | End: 2023-02-17 | Stop reason: HOSPADM

## 2023-02-15 RX ADMIN — ACETAMINOPHEN 975 MG: 325 TABLET, FILM COATED ORAL at 19:06

## 2023-02-15 RX ADMIN — OXYCODONE HYDROCHLORIDE 10 MG: 5 TABLET ORAL at 20:36

## 2023-02-15 RX ADMIN — ACETAMINOPHEN 975 MG: 325 TABLET, FILM COATED ORAL at 10:08

## 2023-02-15 RX ADMIN — HYDROMORPHONE HYDROCHLORIDE 0.4 MG: 0.2 INJECTION, SOLUTION INTRAMUSCULAR; INTRAVENOUS; SUBCUTANEOUS at 13:06

## 2023-02-15 RX ADMIN — KETOROLAC TROMETHAMINE 15 MG: 15 INJECTION, SOLUTION INTRAMUSCULAR; INTRAVENOUS at 16:21

## 2023-02-15 RX ADMIN — OXYCODONE HYDROCHLORIDE 5 MG: 5 TABLET ORAL at 16:21

## 2023-02-15 RX ADMIN — HYDROMORPHONE HYDROCHLORIDE 0.4 MG: 0.2 INJECTION, SOLUTION INTRAMUSCULAR; INTRAVENOUS; SUBCUTANEOUS at 09:02

## 2023-02-15 RX ADMIN — HYDROMORPHONE HYDROCHLORIDE 0.4 MG: 0.2 INJECTION, SOLUTION INTRAMUSCULAR; INTRAVENOUS; SUBCUTANEOUS at 00:09

## 2023-02-15 RX ADMIN — ENOXAPARIN SODIUM 40 MG: 40 INJECTION SUBCUTANEOUS at 10:09

## 2023-02-15 RX ADMIN — ALVIMOPAN 12 MG: 12 CAPSULE ORAL at 22:44

## 2023-02-15 RX ADMIN — ACETAMINOPHEN 975 MG: 325 TABLET, FILM COATED ORAL at 04:15

## 2023-02-15 RX ADMIN — KETOROLAC TROMETHAMINE 15 MG: 15 INJECTION, SOLUTION INTRAMUSCULAR; INTRAVENOUS at 22:43

## 2023-02-15 RX ADMIN — HYDROMORPHONE HYDROCHLORIDE 0.4 MG: 0.2 INJECTION, SOLUTION INTRAMUSCULAR; INTRAVENOUS; SUBCUTANEOUS at 04:15

## 2023-02-15 RX ADMIN — ALVIMOPAN 12 MG: 12 CAPSULE ORAL at 10:09

## 2023-02-15 ASSESSMENT — ACTIVITIES OF DAILY LIVING (ADL)
ADLS_ACUITY_SCORE: 23
ADLS_ACUITY_SCORE: 23
ADLS_ACUITY_SCORE: 24
ADLS_ACUITY_SCORE: 18
ADLS_ACUITY_SCORE: 24
ADLS_ACUITY_SCORE: 23
ADLS_ACUITY_SCORE: 24
ADLS_ACUITY_SCORE: 18
ADLS_ACUITY_SCORE: 24
ADLS_ACUITY_SCORE: 23
ADLS_ACUITY_SCORE: 24
ADLS_ACUITY_SCORE: 24

## 2023-02-15 NOTE — ANESTHESIA POSTPROCEDURE EVALUATION
Patient: Tony Bonilla    Procedure: Procedure(s):  Xi Robotic Assisted Sigmoid Colon Resection       Anesthesia Type:  General    Note:  Disposition: Outpatient   Postop Pain Control: Uneventful            Sign Out: Well controlled pain   PONV: No   Neuro/Psych: Uneventful            Sign Out: Acceptable/Baseline neuro status   Airway/Respiratory: Uneventful            Sign Out: Acceptable/Baseline resp. status   CV/Hemodynamics: Uneventful            Sign Out: Acceptable CV status; No obvious hypovolemia; No obvious fluid overload   Other NRE: NONE   DID A NON-ROUTINE EVENT OCCUR? No           Last vitals:  Vitals Value Taken Time   /84 02/14/23 1800   Temp 97.8  F (36.6  C) 02/14/23 1800   Pulse 66 02/14/23 1808   Resp 16 02/14/23 1808   SpO2 97 % 02/14/23 1808   Vitals shown include unvalidated device data.    Electronically Signed By: Rahul Del Real MD  February 14, 2023  6:09 PM

## 2023-02-15 NOTE — PLAN OF CARE
To Do:  End of Shift Summary  For vital signs and complete assessments, please see documentation flowsheets.     Pertinent assessments: POD #1. A&Ox4, RA. no gas or BM. Denies nausea. Lap sites CDI. Pt. using IS. Pain controlled with IV dilaudid x2, ice & scheduled tylenol. Abdominal binder in room. Advanced to full liquid diet with no N/V. Good urine output. Ambulated with bathroom w/ gen weakness. Pt. Refused to ambulate in hallway. Pt. On LR & capno.    Major Shift Events: Uneventful    Treatment Plan: Pain management, BM, increasing physical activity.    Bedside Nurse: Sun Yusuf RN

## 2023-02-15 NOTE — PLAN OF CARE
Pertinent assessments: A&Ox4, BP elevated. RA. BS hypo, no gas. Denies nausea. Lap sites CDI. Pain controlled with IV dilaudid, ice & scheduled tylenol. Good urine output. Zavala comes out POD 1. Dangled & stood at bedside.     Major Shift Events: Uneventful  Treatment Plan: Pain management, tolerate clear diet.    Bedside Nurse: Kandi Umana RN

## 2023-02-15 NOTE — PROGRESS NOTES
[facility]  PROGRESS NOTE    February 15, 2023  Post-op Day # 1    SUBJECTIVE:  Patient reports abdominal pain.  No nausea or vomiting.  Tolerating water.  Up to the bathroom already.    OBJECTIVE:  Temp:  [97.7  F (36.5  C)-99  F (37.2  C)] 99  F (37.2  C)  Pulse:  [53-91] 86  Resp:  [9-24] 23  BP: (130-186)/() 145/79  SpO2:  [94 %-100 %] 94 %    Intake/Output Summary (Last 24 hours) at 2/15/2023 0916  Last data filed at 2/15/2023 0815  Gross per 24 hour   Intake 2300 ml   Output 2020 ml   Net 280 ml       GENERAL:  Awake, alert, no acute distress, lying in bed  HEAD: Nomocephalic atraumatic  SCLERA: anicteric  EXTREMITIES: warm and well perfused  ABDOMEN:  Soft, appropriately tender, non-distended, no rebound or guarding, no peritoneal signs.  INCISION:  C/d/i, no sign of infection    LABS:  Lab Results   Component Value Date    WBC 8.8 02/10/2023     Lab Results   Component Value Date    HGB 15.2 02/15/2023     Lab Results   Component Value Date    HCT 47.5 02/10/2023     Lab Results   Component Value Date     02/15/2023     Last Basic Metabolic Panel:  Lab Results   Component Value Date     02/15/2023      Lab Results   Component Value Date    POTASSIUM 4.2 02/15/2023     Lab Results   Component Value Date    CHLORIDE 102 02/15/2023     Lab Results   Component Value Date    HERI 8.4 02/15/2023     Lab Results   Component Value Date    CO2 22 02/15/2023     Lab Results   Component Value Date    BUN 6.2 02/15/2023     Lab Results   Component Value Date    CR 0.96 02/15/2023     Lab Results   Component Value Date    GLC 89 02/15/2023       ASSESSMENT/PLAN: 32 year old man POD#1 s/p robotic sigmoid colectomy for diverticulitis.    - Full liquid diet  - SLIV  - Pain management as needed  - Abdominal binder for comfort  - Encourage ambulation at least 5x daily  - Lovenox for ppx, will not need at discharge      Disposition: Expected discharge in 1-2 days.  Barriers to discharge:  Tolerating low fiber diet, pain controlled with oral meds, return of bowel function.    For questions/paging, please contact the [facility] office at [phone].    Sheryl Carter PA-C  [facility]  Phone: [phone]      [facility] Attending Note    Patient seen and examined independently.  Agree with above assessment and plan.  Doing well. No nausea. Getting up to void. C/O pain but controlled with dilaudid  abd soft, incisions CDI with exofin. No signs of infection  Plan as above.  Advance diet, entereg.  Add toradol and robaxin as needed for pain control. Minimize narcotics  Encourage ambulation.  Likely discharge tomorrow if doing well.     Colette White MD  [facility]  [address], [address]  [place] [address]  T: [phone]  F: [phone]   [email or web address]

## 2023-02-15 NOTE — PLAN OF CARE
Goal Outcome Evaluation:    Pertinent assessments: A&Ox4, arrived to unit at 1900. POD 0 sigmoid colon resection, 6 abd lap sites left open to air.BP elevated at 148/88. 2L via capno. Zavala draining with good output.     Major Shift Events: IV Dilaudid 0.4mg given x2. Ice pack applied to abd, cont to c/o abd and rectal pain.    Treatment Plan: Pain management, tolerate clear diet.

## 2023-02-16 LAB
GLUCOSE BLDC GLUCOMTR-MCNC: 86 MG/DL (ref 70–99)
HCV RNA SERPL NAA+PROBE-ACNC: NOT DETECTED IU/ML
PATH REPORT.COMMENTS IMP SPEC: NORMAL
PATH REPORT.COMMENTS IMP SPEC: NORMAL
PATH REPORT.FINAL DX SPEC: NORMAL
PATH REPORT.GROSS SPEC: NORMAL
PATH REPORT.MICROSCOPIC SPEC OTHER STN: NORMAL
PATH REPORT.RELEVANT HX SPEC: NORMAL
PHOTO IMAGE: NORMAL

## 2023-02-16 PROCEDURE — 250N000011 HC RX IP 250 OP 636: Performed by: PHYSICIAN ASSISTANT

## 2023-02-16 PROCEDURE — 250N000011 HC RX IP 250 OP 636: Performed by: COLON & RECTAL SURGERY

## 2023-02-16 PROCEDURE — 250N000013 HC RX MED GY IP 250 OP 250 PS 637: Performed by: COLON & RECTAL SURGERY

## 2023-02-16 PROCEDURE — 120N000001 HC R&B MED SURG/OB

## 2023-02-16 PROCEDURE — 258N000003 HC RX IP 258 OP 636: Performed by: COLON & RECTAL SURGERY

## 2023-02-16 RX ADMIN — OXYCODONE HYDROCHLORIDE 10 MG: 5 TABLET ORAL at 02:28

## 2023-02-16 RX ADMIN — ACETAMINOPHEN 975 MG: 325 TABLET, FILM COATED ORAL at 10:27

## 2023-02-16 RX ADMIN — OXYCODONE HYDROCHLORIDE 10 MG: 5 TABLET ORAL at 14:57

## 2023-02-16 RX ADMIN — SODIUM CHLORIDE, POTASSIUM CHLORIDE, SODIUM LACTATE AND CALCIUM CHLORIDE: 600; 310; 30; 20 INJECTION, SOLUTION INTRAVENOUS at 02:31

## 2023-02-16 RX ADMIN — ENOXAPARIN SODIUM 40 MG: 40 INJECTION SUBCUTANEOUS at 10:27

## 2023-02-16 RX ADMIN — OXYCODONE HYDROCHLORIDE 10 MG: 5 TABLET ORAL at 10:31

## 2023-02-16 RX ADMIN — OXYCODONE HYDROCHLORIDE 5 MG: 5 TABLET ORAL at 21:52

## 2023-02-16 RX ADMIN — ACETAMINOPHEN 975 MG: 325 TABLET, FILM COATED ORAL at 18:46

## 2023-02-16 RX ADMIN — OXYCODONE HYDROCHLORIDE 10 MG: 5 TABLET ORAL at 18:46

## 2023-02-16 RX ADMIN — ACETAMINOPHEN 650 MG: 325 TABLET ORAL at 23:59

## 2023-02-16 RX ADMIN — ACETAMINOPHEN 975 MG: 325 TABLET, FILM COATED ORAL at 02:28

## 2023-02-16 RX ADMIN — ALVIMOPAN 12 MG: 12 CAPSULE ORAL at 10:33

## 2023-02-16 RX ADMIN — KETOROLAC TROMETHAMINE 15 MG: 15 INJECTION, SOLUTION INTRAMUSCULAR; INTRAVENOUS at 06:15

## 2023-02-16 ASSESSMENT — ACTIVITIES OF DAILY LIVING (ADL)
ADLS_ACUITY_SCORE: 20
ADLS_ACUITY_SCORE: 22
ADLS_ACUITY_SCORE: 20
ADLS_ACUITY_SCORE: 22
ADLS_ACUITY_SCORE: 22
ADLS_ACUITY_SCORE: 20
ADLS_ACUITY_SCORE: 20
ADLS_ACUITY_SCORE: 22
ADLS_ACUITY_SCORE: 20
ADLS_ACUITY_SCORE: 20
ADLS_ACUITY_SCORE: 22
ADLS_ACUITY_SCORE: 20

## 2023-02-16 NOTE — PLAN OF CARE
Goal Outcome Evaluation:         Vitals stable and afebrile. Much bloating and not passing gas at beginning of shift. Amb in room and palacios. Warm blanket to abd. Then able to pass flatus. Tolerated full liquids and iv maintenance fluids iv.    Encourage to amb at least qid tomorrow and eat all meals sitting up in chair. States he will. Pain eased with iv toradol, tyl and oxycodone. Also had entereg tonight.             No

## 2023-02-16 NOTE — PLAN OF CARE
To Do:  End of Shift Summary  For vital signs and complete assessments, please see documentation flowsheets.     Pertinent assessments: POD #2. A&Ox4, RA. Passing gas. No BM. Advanced to low fiber diet. Denies nausea. Lap sites CDI. Pt. using IS with encouragement. Pain controlled with IV dilaudid x2, ice & scheduled tylenol. Abdominal binder in room. Advanced to full liquid diet with no N/V. Up Independently. Pt's showered today.     Major Shift Events: Diet advanced.     Treatment Plan: Pain management, increasing physical activity.    Bedside Nurse: Sun Yusuf RN

## 2023-02-16 NOTE — PROGRESS NOTES
Colon and Rectal Surgery Progress Note          Assessment and Plan:     POD #2    Doing well.  Will advance diet.  Stop fluids  Possible discharge later today      Wilmer Monroy MD FACS FASCRS  Colorectal Surgeon       [facility]  [address], [address]  [place] [address]  T: [phone]  F: [phone]  Pager: [phone]  [email or web address] Live Chat                 Interval History:     Passing gas.  No bm.  hungry              Physical Exam:   Vitals were reviewed  Patient Vitals for the past 24 hrs:   BP Temp Temp src Pulse Resp SpO2   02/15/23 2354 136/72 98.7  F (37.1  C) Oral 84 24 94 %   02/15/23 1621 (!) 151/90 99.5  F (37.5  C) Oral 92 20 94 %   02/15/23 0838 (!) 145/79 99  F (37.2  C) Oral 86 23 94 %         Intake/Output Summary (Last 24 hours) at 2/16/2023 0654  Last data filed at 2/15/2023 2358  Gross per 24 hour   Intake 280 ml   Output 1550 ml   Net -1270 ml       Head - Nomocephalic atraumatic  Sclera anicteric  Extremities warm and well perfused  Lungs - breathing non labored,   Abdomen - soft, wounds look good  Rectal exam - deferred  Skin - no rash  Psych - affect appropriate  Neuro - no focal deficits             Data:   All laboratory and imaging data in the past 24 hours reviewed    CBC  Lab Results   Component Value Date    WBC 8.8 02/10/2023    WBC 12.9 (H) 11/29/2022    WBC 17.0 (H) 11/25/2022    HGB 15.2 02/15/2023    HGB 17.1 02/10/2023    HGB 14.8 11/29/2022    HCT 47.5 02/10/2023    HCT 45.0 11/29/2022    HCT 43.4 11/25/2022     02/15/2023     02/10/2023     11/29/2022       BMP  Recent Labs   Lab Test 02/16/23  0611 02/15/23  0609 02/14/23  1847 02/10/23  1405   NA  --  138  --  141   POTASSIUM  --  4.2  --  3.7   CHLORIDE  --  102  --  102   CO2  --  22  --  23   ANIONGAP  --  14  --  16*   GLC 86 89  --  106*   BUN  --  6.2  --  8.4   CR  --  0.96 1.14 1.04   HERI  --  8.4*  --  9.7       Liver Function Studies -   Recent Labs   Lab Test  02/10/23  1405   PROTTOTAL 7.5   ALBUMIN 4.8   BILITOTAL 2.0*   ALKPHOS 82   AST 59*   ALT 87*       New imaging data reviewed: No    Total time spent in counseling, direct patient care, coordination of care, and review of data 15 min

## 2023-02-16 NOTE — PLAN OF CARE
[id number]      To Do:  End of Shift Summary  For vital signs and complete assessments, please see documentation flowsheets.     Pertinent assessments: POD #2. A&Ox4, RA. Passing gas. Denies nausea. Lap sites CDI. Pt. using IS. Pain controlled with IV dilaudid, ice, Toradol & scheduled tylenol. Advanced to full liquid diet with no N/V. Up Independent. LR infusing at 75 ml/hr.     Major Shift Events: Uneventful    Treatment Plan: Pain management, increasing physical activity.    Bedside Nurse: Sendy Cantrell RN

## 2023-02-17 VITALS
OXYGEN SATURATION: 96 % | HEIGHT: 70 IN | TEMPERATURE: 98.4 F | SYSTOLIC BLOOD PRESSURE: 144 MMHG | RESPIRATION RATE: 18 BRPM | BODY MASS INDEX: 32.33 KG/M2 | DIASTOLIC BLOOD PRESSURE: 91 MMHG | HEART RATE: 86 BPM | WEIGHT: 225.8 LBS

## 2023-02-17 PROCEDURE — 250N000013 HC RX MED GY IP 250 OP 250 PS 637: Performed by: COLON & RECTAL SURGERY

## 2023-02-17 RX ORDER — OXYCODONE HYDROCHLORIDE 5 MG/1
5 TABLET ORAL EVERY 6 HOURS PRN
Qty: 25 TABLET | Refills: 0 | Status: SHIPPED | OUTPATIENT
Start: 2023-02-17

## 2023-02-17 RX ADMIN — ACETAMINOPHEN 975 MG: 325 TABLET, FILM COATED ORAL at 10:21

## 2023-02-17 RX ADMIN — OXYCODONE HYDROCHLORIDE 10 MG: 5 TABLET ORAL at 06:09

## 2023-02-17 RX ADMIN — OXYCODONE HYDROCHLORIDE 5 MG: 5 TABLET ORAL at 10:21

## 2023-02-17 ASSESSMENT — ACTIVITIES OF DAILY LIVING (ADL)
ADLS_ACUITY_SCORE: 20

## 2023-02-17 NOTE — PROGRESS NOTES
"Colon & Rectal Surgery Progress Note             Interval History:   Postop Day #: 3 robotic sigmoid for diverticular disease.  Doing well. + bm and flatus  Tolerating diet                Medications:   I have reviewed this patient's current medications               Physical Exam:   Blood pressure (!) 144/91, pulse 86, temperature 98.4  F (36.9  C), temperature source Oral, resp. rate 18, height 1.772 m (5' 9.75\"), weight 102.4 kg (225 lb 12.8 oz), SpO2 96 %.  No intake or output data in the 24 hours ending 02/17/23 0806  GEN:  alert  ABD:  Soft, incision CDI         Data:        Lab Results   Component Value Date     02/15/2023    Lab Results   Component Value Date    CHLORIDE 102 02/15/2023    Lab Results   Component Value Date    BUN 6.2 02/15/2023      Lab Results   Component Value Date    POTASSIUM 4.2 02/15/2023    Lab Results   Component Value Date    CO2 22 02/15/2023    Lab Results   Component Value Date    CR 0.96 02/15/2023    CR 1.14 02/14/2023        Lab Results   Component Value Date    HGB 15.2 02/15/2023    HGB 17.1 02/10/2023     Lab Results   Component Value Date     02/15/2023     02/10/2023     Lab Results   Component Value Date    WBC 8.8 02/10/2023    WBC 12.9 (H) 11/29/2022            Assessment and Plan:   Doing well, discharge home today. Wishes to have narcotics filled at other pharmacy.  Path pending.       Colette White MD  [facility]  Office Phone # [phone]    "

## 2023-02-17 NOTE — PLAN OF CARE
Goal Outcome Evaluation:    End of Shift Summary  For vital signs and complete assessments, please see documentation flowsheets.     Pertinent assessments: POD #3.   A&Ox4. Moving independently in room. Abdominal pain well controlled w/ oxycodone and tylenol. Lap sites CDI, SHEREEN. BS active. Tolerating low fiber diet, denies nausea.     Discharge information given, questions answered. Paper prescription sent with patient. All belongings sent with patient. Discharged home w/ mother.

## 2023-02-17 NOTE — PLAN OF CARE
[id number]    Assessments: A/Ox4. VSS. Oxycodone given x1, ice applied. +gas, +BM from previous shift. Tolerating low fiber diet, no n/v. Up independently.     Treatment Plan: pain management, possible discharge tomorrow

## 2023-02-17 NOTE — PLAN OF CARE
End of Shift Summary  For vital signs and complete assessments, please see documentation flowsheets.     Pertinent assessments: POD #3. VSS. Afebrile. LS clear. BS hypoactive, pt is passing gas and has had a BM. Denies nausea. Continues to have abdominal pain, treated with Tylenol and Oxy. Low Fiber diet, tolerating well. Abdominal Lap Sites SHEREEN, no drainage noted. A&O. Slept well.    Major Shift Events: none    Treatment Plan: Continue to tolerate diet. Encourage activity, Manage pain, Hopeful discharge today.

## 2023-02-17 NOTE — PLAN OF CARE
[id number]  End of Shift Summary  For vital signs and complete assessments, please see documentation flowsheets.     Pertinent assessments: A/O. Low grade temp 99.2, HR tachy. Oxycodone and scheduled tylenol given for pain, ice applied. +gas, no BM. Hypo BS. [place]. Low fiber diet, denies nausea. Up IND, ambulating in halls. Encouraged to wear abd binder when OOB.     Major Shift Events: uneventful.     Treatment Plan: Symptom management. Diet advancement. Pain control. Monitor BM's.

## 2023-02-17 NOTE — DISCHARGE SUMMARY
[facility] Discharge Summary      Tony Bonilla MRN# [id number]   Age: 32 year old YOB: 1990     Date of Admission:  2/14/2023  Date of Discharge::  2/17/2023  Admitting Physician:  Colette White MD  Discharge Physician:  Colette White MD     PCP:  Kathy Caldera    Disposition: Patient discharged from [facility] to home in stable condition.        Primary Diagnosis:   Sigmoid Diverticulitis            Discharge Medications:   Current Discharge Medication List      START taking these medications    Details   oxyCODONE (ROXICODONE) 5 MG tablet Take 1 tablet (5 mg) by mouth every 6 hours as needed for moderate pain (4-6)  Qty: 25 tablet, Refills: 0    Associated Diagnoses: Diverticulitis         CONTINUE these medications which have NOT CHANGED    Details   acetaminophen (TYLENOL) 500 MG tablet Take 1,000 mg by mouth 2 times daily as needed for mild pain                    Follow Up, Special Instructions:   Discharge diet: Low residue   Discharge activity: No heavy lifting, pushing, pulling for 6 week(s)   Discharge follow-up: Follow up as previously scheduled.   Wound care: Ice to area for comfort  May get incision wet in shower but do not soak or scrub              Procedures:   Procedure(s): 1)  Robotic sigmoid colectomy  2)  Laparoscopic mobilization of splenic flexure  3)  Intraoperative fluorescence angiography  4)  Colorectal anastomosis  5)  Rigid proctoscopy       No other procedures performed during this admission            Consultations:   None          Brief Hospital Summary:   Patient is a 32 year old man who underwent robotic sigmoid colectomy on 2/14/23 by Dr. White.   There were no immediate complications during this procedure.    Please refer to the full operative summary for details.  The patient's hospital course was unremarkable.  Pain was controlled on oral pain regimen.  He was tolerating a low fiber diet.  Bowel function had returned  prior to discharge.  He recovered as anticipated and experienced no post-operative complications.         Attestation:  I have reviewed today's vital signs, notes, medications, labs and imaging.    Sheryl Carter PA-C  [facility]          ADDENDUM:  Length of stay: 3 days  Indicate Y or N for the following:  UTI  No  C diff  No  PNA  No  SSI No  DVT No  PE  No  CVA No  MI No  Enterocutaneous fistula  No  Peripheral nerve injury  No  Abscess (not adjacent to anastomosis)  No  Leak No    Treated with:   Antibiotics N/A   Drain  N/A   Reoperation  N/A  Death within 30 days No  Reintubation  No  Reoperation  No   Procedure N/A

## 2023-02-19 ENCOUNTER — PATIENT OUTREACH (OUTPATIENT)
Dept: CARE COORDINATION | Facility: CLINIC | Age: 33
End: 2023-02-19
Payer: COMMERCIAL

## 2023-02-19 NOTE — PROGRESS NOTES
Clinic Care Coordination Contact  Community Health Worker Initial Outreach    Patient accepts CC: No, Pt declined needs for extra support.     Clinic Care Coordination Contact  [facility]: Post-Discharge Note  SITUATION                                                      Admission:    Admission Date: 02/14/23   Reason for Admission: Sigmoid Diverticulitis  Discharge:   Discharge Date: 02/17/23  Discharge Diagnosis: Sigmoid Diverticulitis    BACKGROUND                                                      Per hospital discharge summary and inpatient provider notes:    Patient is a 32 year old man who underwent robotic sigmoid colectomy on 2/14/23 by Dr. White.   There were no immediate complications during this procedure.    Please refer to the full operative summary for details.  The patient's hospital course was unremarkable.  Pain was controlled on oral pain regimen.  He was tolerating a low fiber diet.  Bowel function had returned prior to discharge.  He recovered as anticipated and experienced no post-operative complications.   ASSESSMENT           Discharge Assessment  How are you doing now that you are home?: doing well  How are your symptoms? (Red Flag symptoms escalate to triage hotline per guidelines): Improved  Do you feel your condition is stable enough to be safe at home until your provider visit?: Yes  Does the patient have their discharge instructions? : Yes  Does the patient have questions regarding their discharge instructions? : No  Were you started on any new medications or were there changes to any of your previous medications? : Yes  Does the patient have all of their medications?: Yes  Do you have questions regarding any of your medications? : No  Do you have all of your needed medical supplies or equipment (DME)?  (i.e. oxygen tank, CPAP, cane, etc.): Yes  Discharge follow-up appointment scheduled within 14 calendar days? : Yes  Discharge Follow Up Appointment Date: 02/27/23  Discharge  Follow Up Appointment Scheduled with?: Primary Care Provider    Post-op (ELIANE CTA Only)  If the patient had a surgery or procedure, do they have any questions for a nurse?: No         PLAN                                                      Outpatient Plan:     Discharge follow-up: Follow up as previously scheduled.     Future Appointments   Date Time Provider Department Tiff   2/27/2023  2:00 PM Kathy Caldera APRN CNP [facility]   [facility]2023 10[facility]   3/20/2023 10:00 [facility]   3/24/2023 10:00 AM Kathy Caldera APRN CNP [facility]     For any urgent concerns, please contact our 24 hour nurse triage line: [phone] ([phone])       JOE Flannery  [phone]  [facility]

## 2023-02-27 ENCOUNTER — VIRTUAL VISIT (OUTPATIENT)
Dept: PEDIATRICS | Facility: CLINIC | Age: 33
End: 2023-02-27
Payer: COMMERCIAL

## 2023-02-27 DIAGNOSIS — R17 ELEVATED BILIRUBIN: Primary | ICD-10-CM

## 2023-02-27 DIAGNOSIS — F10.20 ALCOHOLISM (H): ICD-10-CM

## 2023-02-27 DIAGNOSIS — Z90.49 HISTORY OF PARTIAL COLECTOMY: ICD-10-CM

## 2023-02-27 PROBLEM — K57.20 COLONIC DIVERTICULAR ABSCESS: Status: RESOLVED | Noted: 2022-11-16 | Resolved: 2023-02-27

## 2023-02-27 PROBLEM — K57.32 SIGMOID DIVERTICULITIS: Status: RESOLVED | Noted: 2022-10-31 | Resolved: 2023-02-27

## 2023-02-27 PROCEDURE — 99213 OFFICE O/P EST LOW 20 MIN: CPT | Mod: 95 | Performed by: NURSE PRACTITIONER

## 2023-02-27 NOTE — PROGRESS NOTES
Tony is a 32 year old who is being evaluated via a billable telephone visit.        Distant Location (provider location):  Off-site    Assessment & Plan     (R17) Elevated bilirubin  (primary encounter diagnosis)  Comment: Several months ago was drinking heavily, then stopped when he got his diverticular abscess diagnosis. For the few weeks prior to his preop was drinking about 4-6 shots per night. Liver enzymes mildly elevated a the preop but bili quite elevated. Has stopped ETOH since surgery but is on scheduled tylenol. CT showed no liver issues.    Plan: Comprehensive metabolic panel (BMP + Alb, Alk         Phos, ALT, AST, Total. Bili, TP)  -Plans to talk to his therapist about whether he needs to completley abstain from ETOH or whether he is capable of moderation. Abstain fully for now until labs normalize and until he speaks with therapy.   -Minimize tylenol, although it's ok to use if needed for now as it will likely be short term pain    (F10.20) Alcoholism (H)  See above.     (Z90.49) History of partial colectomy  Doing well. Pain controlled. Normal BMs. Incisions healing well    No follow-ups on file.    Kathy Caldera, MICHEAL CNP  [facility] MARISELA Gandhi   Tony is a 32 year old, presenting for the following health issues:  No chief complaint on file.  The two weeks prior to the preop was having 2-3 gin and tonics per night with 1-2 shots each.    Previously did shots but no longer doing that. Not really drinking at all since surgery   HPI     Review of Systems   GI/psych otherwise negative.   Objective         Vitals:  No vitals were obtained today due to virtual visit.    Physical Exam   N/A    Phone call duration: 15 minutes

## 2023-03-07 ENCOUNTER — VIRTUAL VISIT (OUTPATIENT)
Dept: PSYCHOLOGY | Facility: CLINIC | Age: 33
End: 2023-03-07
Attending: NURSE PRACTITIONER
Payer: COMMERCIAL

## 2023-03-07 DIAGNOSIS — F43.23 ADJUSTMENT DISORDER WITH MIXED ANXIETY AND DEPRESSED MOOD: Primary | ICD-10-CM

## 2023-03-07 PROCEDURE — 90834 PSYTX W PT 45 MINUTES: CPT | Mod: VID

## 2023-03-07 ASSESSMENT — COLUMBIA-SUICIDE SEVERITY RATING SCALE - C-SSRS
TOTAL  NUMBER OF INTERRUPTED ATTEMPTS LIFETIME: NO
1. HAVE YOU WISHED YOU WERE DEAD OR WISHED YOU COULD GO TO SLEEP AND NOT WAKE UP?: YES
TOTAL  NUMBER OF ABORTED OR SELF INTERRUPTED ATTEMPTS LIFETIME: NO
2. HAVE YOU ACTUALLY HAD ANY THOUGHTS OF KILLING YOURSELF?: NO
ATTEMPT LIFETIME: NO
6. HAVE YOU EVER DONE ANYTHING, STARTED TO DO ANYTHING, OR PREPARED TO DO ANYTHING TO END YOUR LIFE?: NO
4. HAVE YOU HAD THESE THOUGHTS AND HAD SOME INTENTION OF ACTING ON THEM?: NO
5. HAVE YOU STARTED TO WORK OUT OR WORKED OUT THE DETAILS OF HOW TO KILL YOURSELF? DO YOU INTEND TO CARRY OUT THIS PLAN?: NO
3. HAVE YOU BEEN THINKING ABOUT HOW YOU MIGHT KILL YOURSELF?: NO
1. IN THE PAST MONTH, HAVE YOU WISHED YOU WERE DEAD OR WISHED YOU COULD GO TO SLEEP AND NOT WAKE UP?: NO
2. HAVE YOU ACTUALLY HAD ANY THOUGHTS OF KILLING YOURSELF?: YES

## 2023-03-07 NOTE — PROGRESS NOTES
[facility]   Mental Health & Addiction Services     Progress Note - Initial Visit    Patient  Name:  Tony Bonilla Date: 3/7/2023         Service Type: Individual     Visit Start Time: 10:02am  Visit End Time: 10:55am    Visit #:  1    Attendees: Client    Service Modality:  Video Visit:      Provider verified identity through the following two step process.  Patient provided:  Patient was verified at admission/transfer    Telemedicine Visit: The patient's condition can be safely assessed and treated via synchronous audio and visual telemedicine encounter.      Reason for Telemedicine Visit: Services only offered telehealth    Originating Site (Patient Location): Patient's home    Distant Site (Provider Location): Provider Remote Setting- Home Office    Consent:  The patient/guardian has verbally consented to: the potential risks and benefits of telemedicine (video visit) versus in person care; bill my insurance or make self-payment for services provided; and responsibility for payment of non-covered services.     Patient would like the video invitation sent by:  My Chart    Mode of Communication:  Video Conference via Amwell    Distant Location (Provider):  Off-site    As the provider I attest to compliance with applicable laws and regulations related to telemedicine.       DATA:   Interactive Complexity: No   Crisis: No     Presenting Concerns/  Current Stressors:   Patient presents with stressors of trauma and concerns around habitual drinking. Patient shares neglecting trauma he has faced in his life and tucking away his emotions. Patient shared trauma history of his mother's struggles with alcohol and abrupt loss of his eldest sister in his teens that has had impact on life and functioning as well as loss of his mother due to her addiction.      ASSESSMENT:  Mental Status Assessment:  Appearance:   Appropriate   Eye Contact:   Good   Psychomotor Behavior: Normal    Attitude:   Pleasant  Orientation:   All  Speech   Rate / Production: Normal/ Responsive   Volume:  Normal   Mood:    Anxious  Depressed   Affect:    Appropriate   Thought Content:  Clear   Thought Form:  Coherent   Insight:    Fair       Safety Issues and Plan for Safety and Risk Management:     [place] Suicide Severity Rating Scale (Lifetime/Recent)  [place] Suicide Severity Rating (Lifetime/Recent) 3/7/2023   1. Wish to be Dead (Lifetime) 1   1. Wish to be Dead (Past 1 Month) 0   2. Non-Specific Active Suicidal Thoughts (Lifetime) 1   2. Non-Specific Active Suicidal Thoughts (Past 1 Month) 0   3. Active Suicidal Ideation with any Methods (Not Plan) Without Intent to Act (Lifetime) 0   4. Active Suicidal Ideation with Some Intent to Act, Without Specific Plan (Lifetime) 0   5. Active Suicidal Ideation with Specific Plan and Intent (Lifetime) 0   Most Severe Ideation Rating (Lifetime) 1   Duration (Lifetime) 1   Controllability (Lifetime) 1   Deterrents (Lifetime) 1   Actual Attempt (Lifetime) 0   Has subject engaged in non-suicidal self-injurious behavior? (Lifetime) 0   Interrupted Attempts (Lifetime) 0   Aborted or Self-Interrupted Attempt (Lifetime) 0   Preparatory Acts or Behavior (Lifetime) 0   Calculated C-SSRS Risk Score (Lifetime/Recent) No Risk Indicated     Patient denies current fears or concerns for personal safety.  Patient denies current or recent suicidal ideation or behaviors.  Patient denies current or recent homicidal ideation or behaviors.  Patient denies current or recent self injurious behavior or ideation.  Patient denies other safety concerns.  Recommended that patient call 911 or go to the local ED should there be a change in any of these risk factors.  Patient reports there are firearms in the house. The firearms are secured in a locked space.     Diagnostic Criteria:  Adjustment Disorder  A. The development of emotional or behavioral symptoms in response to an identifiable stressor(s)  occurring within 3 months of the onset of the stressor(s)  B. These symptoms or behaviors are clinically significant, as evidenced by one or both of the following:  C. The stress-related disturbance does not meet criteria for another disorder & is not not an exacerbation of another mental disorder  D. The symptoms do not represent normal bereavement  E. Once the stressor or its consequences have terminated, the symptoms do not persist for more than an additional 6 months       * Adjustment Disorder with Mixed Anxiety and Depressed Mood: The predominant manifestation is a combination of depression and anxiety      DSM5 Diagnoses: (Sustained by DSM5 Criteria Listed Above)  Diagnoses: Adjustment Disorders  309.28 (F43.23) With mixed anxiety and depressed mood  Psychosocial & Contextual Factors: Hx of grief/loss of sister and mother; habitual drinking; family hx of alcoholism  WHODAS 2.0 (12 item):   WHODAS 2.0 Total Score 3/7/2023   Total Score 17   Total Score MyChart 17     Intervention:   Psychodynamic- Patient processed internal experiences and identified goals for therapy as they worked with therapist toward completing diagnostic assessment.  Collateral Reports Completed:  Not Applicable      PLAN: (Homework, other):  1. Provider will continue Diagnostic Assessment.  Patient was given the following to do until next session:  Engage in self-care by connecting with social supports. Next session 3/20/23    2. Provider recommended the following referrals: None.      3.  Suicide Risk and Safety Concerns were assessed for Tony Bonilla.    Patient meets the following risk assessment and triage: Patient denied any current/recent/lifetime history of suicidal ideation and/or behaviors.  No safety plan indicated at this time.       YANET Lee  March 7, 2023  Service Performed and Documented by [facility]-   Note reviewed and clinical supervision by YANET Calvo [facility]  "3/10/2023    ____________________________________________________________________________________________________________________________________    Individual Treatment Plan    Patient's Name: Tony Bonilla  YOB: 1990    Date of Creation: 3/7/2023 (Due 6/5/2023)  Date Treatment Plan Last Reviewed/Revised: N/A    DSM5 Diagnoses: Adjustment Disorders  309.28 (F43.23) With mixed anxiety and depressed mood  Psychosocial / Contextual Factors: Hx of grief/loss of sister and mother; habitual drinking; family hx of alcoholism  PROMIS (reviewed every 90 days):   PROMIS-10 Scores    PROMIS-10 Total Score w/o Sub Scores 3/7/2023   PROMIS TOTAL - SUBSCORES 29       Referral / Collaboration:  Referral to another professional/service is not indicated at this time..    Anticipated number of session for this episode of care: 24-32  Anticipation frequency of session: Every other week  Anticipated Duration of each session: 38-52 minutes  Treatment plan will be reviewed in 90 days or when goals have been changed.       MeasurableTreatment Goal(s) related to diagnosis / functional impairment(s)  Goal 1: Patient will able to express feelings of grief and loss regarding loss of family member and behavior indicating progression of grief process towards acceptance and coping with reality of loss with acknowledgement of permanent change within 6 months    I will know I've met my goal when I have better tools to understand and cope with my mental health.\"    Objective #A (Patient Action)      Status: New - Date: 3/7/23   Patient will demonstrate/report improved stages of grief that can be safely managed in a lower level of care.  Patient report of able to express feelings of grief and loss regarding loss of family member and behavior indicating progression of grief process towards acceptance and coping with reality of loss with acknowledgement of permanent change within 6 months   Intervention(s)  Therapist will teach " "provide individual therapy to process through grief and loss and to gain effective coping skills. Tx to discuss current stressors and interpersonal conflicts and how to cope with these, coaching, diagnostic testing, referral for medication as indicated, use prescribed medication, cognitive restructuring, interpersonal family therapy, supportive therapy services.    Goal 2: Patient will follow CD treatment recommendations within 60 days as reported by client, absence of chemical use to level of comfort and satisfaction as measured by client report for a period of at least 30 days within 6 months as clinically observed and by patient self-report.  \" I will know I have met my goal when I have gained insight into why I habitually drink.\"    Objective #A (Patient Action)    Status: New - Date: 3/7/23    Patient will demonstrate improved insight into substance use and identify plan for sobriety that can be addressed at a lower level of care. Follow CD treatment recommendations within 60 days as reported by client, absence of  chemical use to level of comfort and satisfaction as measured by client report for a period of at least 30 days within 6 months as clinically observed and by patient self-report.    Intervention(s)  Therapist will provide individual therapies to process reasons for substance use, identify alternative coping tools and recreational activities, and develop plan for sobriety. Tx to discuss current stressors and interpersonal conflicts and how to cope with these, coaching, diagnostic testing, referral for medication as indicated, use prescribed medication, cognitive restructuring, interpersonal family therapy, supportive therapy services.      Patient has reviewed and agreed to the above plan.      YANET Lee  March 7, 2023  Service Performed and Documented by [facility]-   tx plan reviewed and clinical supervision by YANET Calvo [facility] 3/10/2023    "

## 2023-03-10 ENCOUNTER — TRANSFERRED RECORDS (OUTPATIENT)
Dept: HEALTH INFORMATION MANAGEMENT | Facility: CLINIC | Age: 33
End: 2023-03-10

## 2023-03-20 ENCOUNTER — VIRTUAL VISIT (OUTPATIENT)
Dept: PSYCHOLOGY | Facility: CLINIC | Age: 33
End: 2023-03-20
Payer: COMMERCIAL

## 2023-03-20 DIAGNOSIS — F43.23 ADJUSTMENT DISORDER WITH MIXED ANXIETY AND DEPRESSED MOOD: Primary | ICD-10-CM

## 2023-03-20 PROCEDURE — 99207 PR NO BILLABLE SERVICE THIS VISIT: CPT | Mod: VID

## 2023-03-20 NOTE — PROGRESS NOTES
3/20/2023    Therapist contacted patient for scheduled visit this morning at 10:00am. Patient is unable to present to session due to misunderstanding around scheduled time. Patient stated they were going to confirm work schedule and return call to reschedule session later this week.     Brianna HOLT, LGSW   3/20/2023

## 2023-04-10 ENCOUNTER — VIRTUAL VISIT (OUTPATIENT)
Dept: PSYCHOLOGY | Facility: CLINIC | Age: 33
End: 2023-04-10
Payer: COMMERCIAL

## 2023-04-10 DIAGNOSIS — F43.23 ADJUSTMENT DISORDER WITH MIXED ANXIETY AND DEPRESSED MOOD: Primary | ICD-10-CM

## 2023-04-10 PROCEDURE — 90834 PSYTX W PT 45 MINUTES: CPT | Mod: VID

## 2023-04-10 NOTE — PROGRESS NOTES
[facility]                                     Progress Note    Patient Name: Tony Bonilla  Date: 4/10/2023         Service Type: Individual      Session Start Time: 12:34pm  Session End Time: 1:17pm     Session Length: 38-52 minutes    Session #: 2    Attendees: Client    Service Modality:  Video Visit:      Provider verified identity through the following two step process.  Patient provided:  Patient is known previously to provider    Telemedicine Visit: The patient's condition can be safely assessed and treated via synchronous audio and visual telemedicine encounter.      Reason for Telemedicine Visit: Patient convenience (e.g. access to timely appointments / distance to available provider)    Originating Site (Patient Location): Patient's other observed in parked vehicle    Distant Site (Provider Location): [facility]    Consent:  The patient/guardian has verbally consented to: the potential risks and benefits of telemedicine (video visit) versus in person care; bill my insurance or make self-payment for services provided; and responsibility for payment of non-covered services.     Patient would like the video invitation sent by:  My Chart    Mode of Communication:  Video Conference via [employer]    Distant Location (Provider):  On-site    As the provider I attest to compliance with applicable laws and regulations related to telemedicine.    DATA  Interactive Complexity: No  Crisis: No        Progress Since Last Session (Related to Symptoms / Goals / Homework):   Symptoms: No change per patient reporting    Homework: Assigned      Episode of Care Goals: Minimal progress - PREPARATION (Decided to change - considering how); Intervened by negotiating a change plan and determining options / strategies for behavior change, identifying triggers, exploring social supports, and working towards setting a date to begin behavior change     Current / Ongoing Stressors and  Concerns:    Patient presents with stressors of trauma and concerns around habitual drinking. Patient shares neglecting trauma he has faced in his life and tucking away his emotions. Patient shared trauma history of his mother's struggles with alcohol and abrupt loss of his eldest sister in his teens that has had impact on life and functioning as well as loss of his mother due to her addiction.        Treatment Objective(s) Addressed in This Session:   Patient will able to express feelings of grief and loss regarding loss of family member and behavior indicating progression of grief process towards acceptance and coping with reality of loss with acknowledgement of permanent change within 6 months  Patient will follow CD treatment recommendations within 60 days as reported by client, absence of chemical use to level of comfort and satisfaction as measured by client report for a period of at least 30 days within 6 months as clinically observed and by patient self-report.     Intervention:   Psychodynamic: patient processed internal experiences as they worked with therapist to complete diagnostic assessment.    Assessments completed prior to visit:  The following assessments were completed by patient for this visit:  PHQ9:       11/10/2022     2:42 PM   PHQ-9 SCORE   PHQ-9 Total Score MyChart 10 (Moderate depression)   PHQ-9 Total Score 10     GAD7:        View : No data to display.              PROMIS 10-Global Health (only subscores and total score):       3/7/2023     9:55 AM   PROMIS-10 Scores Only   Global Mental Health Score 13   Global Physical Health Score 16   PROMIS TOTAL - SUBSCORES 29      [place] Suicide Severity Rating Scale (Lifetime/Recent)      3/7/2023    10:11 AM   [place] Suicide Severity Rating (Lifetime/Recent)   1. Wish to be Dead (Lifetime) Y   1. Wish to be Dead (Past 1 Month) N   2. Non-Specific Active Suicidal Thoughts (Lifetime) Y   2. Non-Specific Active Suicidal Thoughts (Past 1 Month) N    3. Active Suicidal Ideation with any Methods (Not Plan) Without Intent to Act (Lifetime) N   4. Active Suicidal Ideation with Some Intent to Act, Without Specific Plan (Lifetime) N   5. Active Suicidal Ideation with Specific Plan and Intent (Lifetime) N   Most Severe Ideation Rating (Lifetime) 1   Duration (Lifetime) 1   Controllability (Lifetime) 1   Deterrents (Lifetime) 1   Actual Attempt (Lifetime) N   Has subject engaged in non-suicidal self-injurious behavior? (Lifetime) N   Interrupted Attempts (Lifetime) N   Aborted or Self-Interrupted Attempt (Lifetime) N   Preparatory Acts or Behavior (Lifetime) N   Calculated C-SSRS Risk Score (Lifetime/Recent) No Risk Indicated         ASSESSMENT: Current Emotional / Mental Status (status of significant symptoms):   Risk status (Self / Other harm or suicidal ideation)   Patient denies current fears or concerns for personal safety.   Patient denies current or recent suicidal ideation or behaviors.   Patient denies current or recent homicidal ideation or behaviors.   Patient denies current or recent self injurious behavior or ideation.   Patient denies other safety concerns.   Patient reports there has been no change in risk factors since their last session.     Patient reports there has been no change in protective factors since their last session.     Recommended that patient call 911 or go to the local ED should there be a change in any of these risk factors.     Appearance:   Appropriate    Eye Contact:   Good    Psychomotor Behavior: Normal    Attitude:   Friendly Pleasant   Orientation:   All   Speech    Rate / Production: Normal/ Responsive    Volume:  Normal    Mood:    Anxious  Depressed    Affect:    Appropriate    Thought Content:  Clear  Rumination    Thought Form:  Coherent    Insight:    Fair      Medication Review:   No current psychiatric medications prescribed     Medication Compliance:   NA     Changes in Health Issues:   None reported     Chemical Use  Review:   Substance Use: Problem use continues with no change since last session, Not addressed in session        Tobacco Use: No change in amount of tobacco use since last session.  Therapist did not assess    Diagnosis:  1. Adjustment disorder with mixed anxiety and depressed mood        Collateral Reports Completed:   Not Applicable    PLAN: (Patient Tasks / Therapist Tasks / Other)  Patient to engage in self-care coping skill of playing guitar. Next session  4/27/2023.        YANET Lee 4/10/2023     Service Performed and Documented by [facility]-   Note reviewed and clinical supervision by YANET Calvo [facility] 4/11/2023                                                      ____________________________________________________________________________________________________________________________________     Individual Treatment Plan     Patient's Name: Tony Bonilla               YOB: 1990     Date of Creation: 3/7/2023 (Due 6/5/2023)  Date Treatment Plan Last Reviewed/Revised: N/A     DSM5 Diagnoses: Adjustment Disorders  309.28 (F43.23) With mixed anxiety and depressed mood  Psychosocial / Contextual Factors: Hx of grief/loss of sister and mother; habitual drinking; family hx of alcoholism  PROMIS (reviewed every 90 days):   PROMIS-10 Scores     PROMIS-10 Total Score w/o Sub Scores 3/7/2023   PROMIS TOTAL - SUBSCORES 29         Referral / Collaboration:  Referral to another professional/service is not indicated at this time..     Anticipated number of session for this episode of care: 24-32  Anticipation frequency of session: Every other week  Anticipated Duration of each session: 38-52 minutes  Treatment plan will be reviewed in 90 days or when goals have been changed.         MeasurableTreatment Goal(s) related to diagnosis / functional impairment(s)  Goal 1: Patient will able to express feelings of grief and loss regarding loss of family member and behavior indicating progression  "of grief process towards acceptance and coping with reality of loss with acknowledgement of permanent change within 6 months    I will know I've met my goal when I have better tools to understand and cope with my mental health.\"     Objective #A (Patient Action)                             Status: New - Date: 3/7/23   Patient will demonstrate/report improved stages of grief that can be safely managed in a lower level of care.  Patient report of able to express feelings of grief and loss regarding loss of family member and behavior indicating progression of grief process towards acceptance and coping with reality of loss with acknowledgement of permanent change within 6 months   Intervention(s)  Therapist will teach provide individual therapy to process through grief and loss and to gain effective coping skills. Tx to discuss current stressors and interpersonal conflicts and how to cope with these, coaching, diagnostic testing, referral for medication as indicated, use prescribed medication, cognitive restructuring, interpersonal family therapy, supportive therapy services.     Goal 2: Patient will follow CD treatment recommendations within 60 days as reported by client, absence of chemical use to level of comfort and satisfaction as measured by client report for a period of at least 30 days within 6 months as clinically observed and by patient self-report.  \" I will know I have met my goal when I have gained insight into why I habitually drink.\"     Objective #A (Patient Action)                          Status: New - Date: 3/7/23               Patient will demonstrate improved insight into substance use and identify plan for sobriety that can be addressed at a lower level of care. Follow CD treatment recommendations within 60 days as reported by client, absence of        chemical use to level of comfort and satisfaction as measured by client report for a period of at least 30 days within 6 months as clinically " observed and by patient self-report.     Intervention(s)  Therapist will provide individual therapies to process reasons for substance use, identify alternative coping tools and recreational activities, and develop plan for sobriety. Tx to discuss current stressors and interpersonal conflicts and how to cope with these, coaching, diagnostic testing, referral for medication as indicated, use prescribed medication, cognitive restructuring, interpersonal family therapy, supportive therapy services.        Patient has reviewed and agreed to the above plan.        YANET Lee                  March 7, 2023  Service Performed and Documented by [facility]-   tx plan reviewed and clinical supervision by YANET Calvo [facility] 3/10/2023

## 2023-04-27 ENCOUNTER — VIRTUAL VISIT (OUTPATIENT)
Dept: PSYCHOLOGY | Facility: CLINIC | Age: 33
End: 2023-04-27
Payer: COMMERCIAL

## 2023-04-27 DIAGNOSIS — F43.23 ADJUSTMENT DISORDER WITH MIXED ANXIETY AND DEPRESSED MOOD: Primary | ICD-10-CM

## 2023-04-27 NOTE — PROGRESS NOTES
4/27/2023    Therapist and patient briefly checked in. Patient identified feeling unwell and therapist observed patient appeared tired. Patient rescheduled session. Therapist and patient to finish DA during following visit on 5/9/23.    Brianna HOLT, LGSW   4/27/2023

## 2023-05-09 ENCOUNTER — VIRTUAL VISIT (OUTPATIENT)
Dept: PSYCHOLOGY | Facility: CLINIC | Age: 33
End: 2023-05-09
Payer: COMMERCIAL

## 2023-05-09 DIAGNOSIS — F10.99 ALCOHOL-RELATED DISORDER (H): ICD-10-CM

## 2023-05-09 DIAGNOSIS — F33.1 MAJOR DEPRESSIVE DISORDER, RECURRENT EPISODE, MODERATE WITH ANXIOUS DISTRESS (H): Primary | ICD-10-CM

## 2023-05-09 PROCEDURE — 90791 PSYCH DIAGNOSTIC EVALUATION: CPT | Mod: VID

## 2023-05-09 ASSESSMENT — ANXIETY QUESTIONNAIRES
GAD7 TOTAL SCORE: 5
GAD7 TOTAL SCORE: 5
5. BEING SO RESTLESS THAT IT IS HARD TO SIT STILL: NOT AT ALL
7. FEELING AFRAID AS IF SOMETHING AWFUL MIGHT HAPPEN: MORE THAN HALF THE DAYS
2. NOT BEING ABLE TO STOP OR CONTROL WORRYING: SEVERAL DAYS
IF YOU CHECKED OFF ANY PROBLEMS ON THIS QUESTIONNAIRE, HOW DIFFICULT HAVE THESE PROBLEMS MADE IT FOR YOU TO DO YOUR WORK, TAKE CARE OF THINGS AT HOME, OR GET ALONG WITH OTHER PEOPLE: SOMEWHAT DIFFICULT
3. WORRYING TOO MUCH ABOUT DIFFERENT THINGS: NOT AT ALL
6. BECOMING EASILY ANNOYED OR IRRITABLE: SEVERAL DAYS
1. FEELING NERVOUS, ANXIOUS, OR ON EDGE: SEVERAL DAYS

## 2023-05-09 ASSESSMENT — PATIENT HEALTH QUESTIONNAIRE - PHQ9: 5. POOR APPETITE OR OVEREATING: NOT AT ALL
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
## 2023-06-01 ENCOUNTER — VIRTUAL VISIT (OUTPATIENT)
Dept: PSYCHOLOGY | Facility: CLINIC | Age: 33
End: 2023-06-01
Payer: COMMERCIAL

## 2023-06-01 DIAGNOSIS — F33.1 MAJOR DEPRESSIVE DISORDER, RECURRENT EPISODE, MODERATE WITH ANXIOUS DISTRESS (H): ICD-10-CM

## 2023-06-01 DIAGNOSIS — F10.99 ALCOHOL-RELATED DISORDER (H): Primary | ICD-10-CM

## 2023-06-01 PROCEDURE — 90834 PSYTX W PT 45 MINUTES: CPT | Mod: VID

## 2023-06-01 NOTE — PROGRESS NOTES
[facility]                                     Progress Note    Patient Name: Tony Bonilla  Date: 6/1/2023         Service Type: Individual      Session Start Time: 1:30pm  Session End Time: 2:15pm     Session Length: 38-52 minutes    Session #: 4    Attendees: Client    Service Modality:  Video Visit:      Provider verified identity through the following two step process.  Patient provided:  Patient is known previously to provider    Telemedicine Visit: The patient's condition can be safely assessed and treated via synchronous audio and visual telemedicine encounter.      Reason for Telemedicine Visit: Services only offered telehealth    Originating Site (Patient Location): Patient's other observed in parkd vehicle    Distant Site (Provider Location): Provider Remote Setting- Home Office    Consent:  The patient/guardian has verbally consented to: the potential risks and benefits of telemedicine (video visit) versus in person care; bill my insurance or make self-payment for services provided; and responsibility for payment of non-covered services.     Patient would like the video invitation sent by:  My Chart    Mode of Communication:  Video Conference via Amwell    Distant Location (Provider):  Off-site    As the provider I attest to compliance with applicable laws and regulations related to telemedicine.    DATA  Interactive Complexity: No  Crisis: No        Progress Since Last Session (Related to Symptoms / Goals / Homework):   Symptoms: No change per patient reporting    Homework: Assigned      Episode of Care Goals: Minimal progress - PREPARATION (Decided to change - considering how); Intervened by negotiating a change plan and determining options / strategies for behavior change, identifying triggers, exploring social supports, and working towards setting a date to begin behavior change     Current / Ongoing Stressors and Concerns:    Patient presents with stressors of trauma and  concerns around habitual drinking. Patient shares neglecting trauma he has faced in his life and tucking away his emotions. Patient shared trauma history of his mother's struggles with alcohol and abrupt loss of his eldest sister in his teens that has had impact on life and functioning as well as loss of his mother due to her addiction.        Treatment Objective(s) Addressed in This Session:   Patient will able to express feelings of grief and loss regarding loss of family member and behavior indicating progression of grief process towards acceptance and coping with reality of loss with acknowledgement of permanent change within 6 months  Patient will follow CD treatment recommendations within 60 days as reported by client, absence of chemical use to level of comfort and satisfaction as measured by client report for a period of at least 30 days within 6 months as clinically observed and by patient self-report.     Intervention:   Therapist met with patient to review goals and interventions. Throughout session therapist provided supportive interventions of empathetic listening as patient shared brief reflection of week. Therapist joined with and supported patient as they processed questions and thoughts around previous session regarding diagnosis. Patient reflected on working to cut back drinking. Therapist and patient reflected on questions/concerns about taking medication for depression and anxiety. Therapist provided pyschoeducation on Harm Reduction. Therapist utilized psychodynamic interventions: observations/reflections, remarks, clarifying questions, emotional identification, and naming emotion. Patient presented with appropriate affect. Patient was engaged throughout session and open to feedback. Patient did not report safety concerns.    Assessments completed prior to visit:  The following assessments were completed by patient for this visit:  PHQ9:       11/10/2022     2:42 PM   PHQ-9 SCORE   PHQ-9 Total  Score MyChart 10 (Moderate depression)   PHQ-9 Total Score 10     GAD7:       5/9/2023     2:22 PM   KHLOE-7 SCORE   Total Score 5     PROMIS 10-Global Health (only subscores and total score):       3/7/2023     9:55 AM   PROMIS-10 Scores Only   Global Mental Health Score 13   Global Physical Health Score 16   PROMIS TOTAL - SUBSCORES 29      [place] Suicide Severity Rating Scale (Lifetime/Recent)      3/7/2023    10:11 AM   [place] Suicide Severity Rating (Lifetime/Recent)   1. Wish to be Dead (Lifetime) Y   1. Wish to be Dead (Past 1 Month) N   2. Non-Specific Active Suicidal Thoughts (Lifetime) Y   2. Non-Specific Active Suicidal Thoughts (Past 1 Month) N   3. Active Suicidal Ideation with any Methods (Not Plan) Without Intent to Act (Lifetime) N   4. Active Suicidal Ideation with Some Intent to Act, Without Specific Plan (Lifetime) N   5. Active Suicidal Ideation with Specific Plan and Intent (Lifetime) N   Most Severe Ideation Rating (Lifetime) 1   Duration (Lifetime) 1   Controllability (Lifetime) 1   Deterrents (Lifetime) 1   Actual Attempt (Lifetime) N   Has subject engaged in non-suicidal self-injurious behavior? (Lifetime) N   Interrupted Attempts (Lifetime) N   Aborted or Self-Interrupted Attempt (Lifetime) N   Preparatory Acts or Behavior (Lifetime) N   Calculated C-SSRS Risk Score (Lifetime/Recent) No Risk Indicated         ASSESSMENT: Current Emotional / Mental Status (status of significant symptoms):   Risk status (Self / Other harm or suicidal ideation)   Patient denies current fears or concerns for personal safety.   Patient denies current or recent suicidal ideation or behaviors.   Patient denies current or recent homicidal ideation or behaviors.   Patient denies current or recent self injurious behavior or ideation.   Patient denies other safety concerns.   Patient reports there has been no change in risk factors since their last session.     Patient reports there has been no change in protective  factors since their last session.     Recommended that patient call 911 or go to the local ED should there be a change in any of these risk factors.     Appearance:   Appropriate    Eye Contact:   Fair    Psychomotor Behavior: Normal    Attitude:   Pleasant   Orientation:   All   Speech    Rate / Production: Normal/ Responsive    Volume:  Normal    Mood:    Anxious  Depressed    Affect:    Appropriate    Thought Content:  Clear    Thought Form:  Coherent    Insight:    Fair      Medication Review:   No current psychiatric medications prescribed     Medication Compliance:   NA     Changes in Health Issues:   None reported     Chemical Use Review:   Substance Use: decrease in alcohol .  Patient reports frequency of use Went from 8 shooter of vodka and 24oz of beer per night to 4 shooters of odka to 24oz beer per night in the past 3 weeks.  Stage of Change: Preparatory and Action  Reviewed concerns related to health related substance abuse risk  Provided encouragement towards sobriety  Provided support and affirmation for steps taken towards sobriety   Facilitated behavior chain analysis  Provided resource for Harm Reduction        Tobacco Use: No change in amount of tobacco use since last session.  Therapist did not assess    Diagnosis:  1. Alcohol-related disorder (H)    2. Major depressive disorder, recurrent episode, moderate with anxious distress (H)        Collateral Reports Completed:   Not Applicable    PLAN: (Patient Tasks / Therapist Tasks / Other)  Patient to review education on harm reduction. Next session 6/14/23.        EPIFANIO Wayne   6/1/2023          Service Performed and Documented by EPIFANIO-   Note reviewed and clinical supervision by YANET Calvo 6/5/2023                                                ____________________________________________________________________________________________________________________________________     Individual Treatment Plan     Patient's  "Name: Tony Bonilla               YOB: 1990     Date of Creation: 3/7/2023 (Due 6/5/2023)  Date Treatment Plan Last Reviewed/Revised: N/A     DSM5 Diagnoses:   296.32 (F33.1) Major Depressive Disorder, Recurrent Episode, Moderate With anxious distress.  291.9 (F10.99) Unspecified Alcohol Related Disorder.     Psychosocial / Contextual Factors: Hx of grief/loss of sister and mother; habitual drinking; family hx of alcoholism  PROMIS (reviewed every 90 days):   PROMIS-10 Scores     PROMIS-10 Total Score w/o Sub Scores 3/7/2023   PROMIS TOTAL - SUBSCORES 29         Referral / Collaboration:  Referral to another professional/service is not indicated at this time..     Anticipated number of session for this episode of care: 24-32  Anticipation frequency of session: Every other week  Anticipated Duration of each session: 38-52 minutes  Treatment plan will be reviewed in 90 days or when goals have been changed.         MeasurableTreatment Goal(s) related to diagnosis / functional impairment(s)  Goal 1: Patient will able to express feelings of grief and loss regarding loss of family member and behavior indicating progression of grief process towards acceptance and coping with reality of loss with acknowledgement of permanent change within 6 months    I will know I've met my goal when I have better tools to understand and cope with my mental health.\"     Objective #A (Patient Action)                             Status: New - Date: 3/7/23   Patient will demonstrate/report improved stages of grief that can be safely managed in a lower level of care.  Patient report of able to express feelings of grief and loss regarding loss of family member and behavior indicating progression of grief process towards acceptance and coping with reality of loss with acknowledgement of permanent change within 6 months   Intervention(s)  Therapist will teach provide individual therapy to process through grief and loss and to gain " "effective coping skills. Tx to discuss current stressors and interpersonal conflicts and how to cope with these, coaching, diagnostic testing, referral for medication as indicated, use prescribed medication, cognitive restructuring, interpersonal family therapy, supportive therapy services.     Goal 2: Patient will follow CD treatment recommendations within 60 days as reported by client, absence of chemical use to level of comfort and satisfaction as measured by client report for a period of at least 30 days within 6 months as clinically observed and by patient self-report.  \" I will know I have met my goal when I have gained insight into why I habitually drink.\"     Objective #A (Patient Action)                          Status: New - Date: 3/7/23               Patient will demonstrate improved insight into substance use and identify plan for sobriety that can be addressed at a lower level of care. Follow CD treatment recommendations within 60 days as reported by client, absence of        chemical use to level of comfort and satisfaction as measured by client report for a period of at least 30 days within 6 months as clinically observed and by patient self-report.     Intervention(s)  Therapist will provide individual therapies to process reasons for substance use, identify alternative coping tools and recreational activities, and develop plan for sobriety. Tx to discuss current stressors and interpersonal conflicts and how to cope with these, coaching, diagnostic testing, referral for medication as indicated, use prescribed medication, cognitive restructuring, interpersonal family therapy, supportive therapy services.        Patient has reviewed and agreed to the above plan.        YANET Lee                  March 7, 2023  Service Performed and Documented by [facility]-   tx plan reviewed and clinical supervision by YANET Calvo [facility] 3/10/2023    "

## 2023-07-20 ENCOUNTER — VIRTUAL VISIT (OUTPATIENT)
Dept: PSYCHOLOGY | Facility: CLINIC | Age: 33
End: 2023-07-20
Payer: COMMERCIAL

## 2023-07-20 DIAGNOSIS — F10.99 ALCOHOL-RELATED DISORDER (H): ICD-10-CM

## 2023-07-20 DIAGNOSIS — F33.1 MAJOR DEPRESSIVE DISORDER, RECURRENT EPISODE, MODERATE WITH ANXIOUS DISTRESS (H): Primary | ICD-10-CM

## 2023-07-20 PROCEDURE — 90834 PSYTX W PT 45 MINUTES: CPT | Mod: VID

## 2023-07-20 NOTE — PROGRESS NOTES
[facility]                                     Progress Note    Patient Name: Tony Bonilla  Date: 7/20/2023         Service Type: Individual      Session Start Time: 1:35pm  Session End Time: 2:20pm     Session Length: 38-52 minutes    Session #: 5    Attendees: Client    Service Modality:  Video Visit:      Provider verified identity through the following two step process.    Patient provided:  Patient is known previously to provider    Telemedicine Visit: The patient's condition can be safely assessed and treated via synchronous audio and visual telemedicine encounter.      Reason for Telemedicine Visit: Services only offered telehealth    Originating Site (Patient Location): Patient's other observed in parked vehicle    Distant Site (Provider Location): Provider Remote Setting- Home Office    Consent:  The patient/guardian has verbally consented to: the potential risks and benefits of telemedicine (video visit) versus in person care; bill my insurance or make self-payment for services provided; and responsibility for payment of non-covered services.     Patient would like the video invitation sent by:  My Chart    Mode of Communication:  Video Conference via Amwell    Distant Location (Provider):  Off-site    As the provider I attest to compliance with applicable laws and regulations related to telemedicine.    DATA  Interactive Complexity: No  Crisis: No        Progress Since Last Session (Related to Symptoms / Goals / Homework):   Symptoms: Worsening per patient report    Homework:  Assigned      Episode of Care Goals: Minimal progress - PREPARATION (Decided to change - considering how); Intervened by negotiating a change plan and determining options / strategies for behavior change, identifying triggers, exploring social supports, and working towards setting a date to begin behavior change     Current / Ongoing Stressors and Concerns:    Patient presents with stressors of trauma and  concerns around habitual drinking. Patient shares neglecting trauma he has faced in his life and tucking away his emotions. Patient shared trauma history of his mother's struggles with alcohol and abrupt loss of his eldest sister in his teens that has had impact on life and functioning as well as loss of his mother due to her addiction.        Treatment Objective(s) Addressed in This Session:   Patient will able to express feelings of grief and loss regarding loss of family member and behavior indicating progression of grief process towards acceptance and coping with reality of loss with acknowledgement of permanent change within 6 months  Patient will follow CD treatment recommendations within 60 days as reported by client, absence of chemical use to level of comfort and satisfaction as measured by client report for a period of at least 30 days within 6 months as clinically observed and by patient self-report.     Intervention:   Therapist met with patient to review goals and interventions. Throughout session therapist provided supportive interventions of empathetic listening as patient shared brief reflection of week. Therapist joined with and supported patient as they processed increased anxiety around concerns of fiance's temperament and communicating concerns. Patient and therapist processed reminders of witnessing conflict between adults and it's impacts in current interpersonal relationship dynamics. Therapist utilized psychodynamic interventions: observations/reflections, remarks, clarifying questions, emotional identification, and naming emotion. Patient presented with appropriate affect. Patient was engaged throughout session and open to feedback. Patient did not report safety concerns.      Assessments completed prior to visit:  The following assessments were completed by patient for this visit:  PHQ9:       11/10/2022     2:42 PM   PHQ-9 SCORE   PHQ-9 Total Score MyChart 10 (Moderate depression)   PHQ-9  Total Score 10     GAD7:       5/9/2023     2:22 PM   KHLOE-7 SCORE   Total Score 5     PROMIS 10-Global Health (only subscores and total score):       3/7/2023     9:55 AM 7/20/2023     1:18 PM   PROMIS-10 Scores Only   Global Mental Health Score 13 12   Global Physical Health Score 16 14   PROMIS TOTAL - SUBSCORES 29 26      [place] Suicide Severity Rating Scale (Lifetime/Recent)      3/7/2023    10:11 AM   [place] Suicide Severity Rating (Lifetime/Recent)   Q1 Wish to be Dead (Lifetime) Y   1. Wish to be Dead (Past 1 Month) N   Q2 Non-Specific Active Suicidal Thoughts (Lifetime) Y   2. Non-Specific Active Suicidal Thoughts (Past 1 Month) N   3. Active Suicidal Ideation with any Methods (Not Plan) Without Intent to Act (Lifetime) N   Q4 Active Suicidal Ideation with Some Intent to Act, Without Specific Plan (Lifetime) N   Q5 Active Suicidal Ideation with Specific Plan and Intent (Lifetime) N   Most Severe Ideation Rating (Lifetime) 1   Duration (Lifetime) 1   Controllability (Lifetime) 1   Deterrents (Lifetime) 1   Actual Attempt (Lifetime) N   Has subject engaged in non-suicidal self-injurious behavior? (Lifetime) N   Interrupted Attempts (Lifetime) N   Aborted or Self-Interrupted Attempt (Lifetime) N   Preparatory Acts or Behavior (Lifetime) N   Calculated C-SSRS Risk Score (Lifetime/Recent) No Risk Indicated         ASSESSMENT: Current Emotional / Mental Status (status of significant symptoms):   Risk status (Self / Other harm or suicidal ideation)   Patient denies current fears or concerns for personal safety.   Patient denies current or recent suicidal ideation or behaviors.   Patient denies current or recent homicidal ideation or behaviors.   Patient denies current or recent self injurious behavior or ideation.   Patient denies other safety concerns.   Patient reports there has been no change in risk factors since their last session.     Patient reports there has been no change in protective factors since  "their last session.     Recommended that patient call 911 or go to the local ED should there be a change in any of these risk factors.     Appearance:   Appropriate    Eye Contact:   Fair    Psychomotor Behavior: Normal    Attitude:   Pleasant   Orientation:   All   Speech    Rate / Production: Normal/ Responsive    Volume:  Normal    Mood:    Anxious  Depressed    Affect:    Appropriate    Thought Content:  Clear    Thought Form:  Coherent    Insight:    Fair      Medication Review:   No current psychiatric medications prescribed     Medication Compliance:   NA     Changes in Health Issues:   None reported     Chemical Use Review:   Substance Use: decrease in alcohol .  Patient reports frequency of use Went from 8 shooter of vodka and 24oz of beer per night to 4 shooters of odka to 24oz beer per night in the past 3 weeks.  Stage of Change: Preparatory and Action  Reviewed concerns related to health related substance abuse risk  Provided encouragement towards sobriety  Provided support and affirmation for steps taken towards sobriety   Facilitated behavior chain analysis  Provided resource for Harm Reduction        Tobacco Use: No change in amount of tobacco use since last session.  Therapist did not assess    Diagnosis:  1. Major depressive disorder, recurrent episode, moderate with anxious distress (H)    2. Alcohol-related disorder (H)        Collateral Reports Completed:   Not Applicable    PLAN: (Patient Tasks / Therapist Tasks / Other)  Patient to practice skill of using \"I\" statements and reflect with partner if fighting to win vs fighting to understand. Next session 8/8/23.        Brianna HOLT, EPIFANIO   7/20/2023  Service Performed and Documented by [facility]-   Note reviewed and clinical supervision [facility]        ____________________________________________________________________________________________________________________________________     Individual Treatment Plan   " "  Patient's Name: Tony Bonilla               YOB: 1990     Date of Creation: 3/7/2023 (Due 6/5/2023)  Date Treatment Plan Last Reviewed/Revised: 7/20/2023 (Due 10/18/23)   DSM5 Diagnoses:   296.32 (F33.1) Major Depressive Disorder, Recurrent Episode, Moderate With anxious distress.  291.9 (F10.99) Unspecified Alcohol Related Disorder.     Psychosocial / Contextual Factors: Hx of grief/loss of sister and mother; habitual drinking; family hx of alcoholism  PROMIS (reviewed every 90 days):   PROMIS-10 Scores     PROMIS-10 Total Score w/o Sub Scores 3/7/2023   PROMIS TOTAL - SUBSCORES 29         Referral / Collaboration:  Referral to another professional/service is not indicated at this time..     Anticipated number of session for this episode of care: 24-32  Anticipation frequency of session: Every other week  Anticipated Duration of each session: 38-52 minutes  Treatment plan will be reviewed in 90 days or when goals have been changed.         MeasurableTreatment Goal(s) related to diagnosis / functional impairment(s)  Goal 1: Patient will able to express feelings of grief and loss regarding loss of family member and behavior indicating progression of grief process towards acceptance and coping with reality of loss with acknowledgement of permanent change within 6 months    I will know I've met my goal when I have better tools to understand and cope with my mental health.\"     Objective #A (Patient Action)                             Status: Continued - Date: 7/20/2023   Patient will demonstrate/report improved stages of grief that can be safely managed in a lower level of care.  Patient report of able to express feelings of grief and loss regarding loss of family member and behavior indicating progression of grief process towards acceptance and coping with reality of loss with acknowledgement of permanent change within 6 months     Intervention(s)  Therapist will teach provide individual therapy to " "process through grief and loss and to gain effective coping skills. Tx to discuss current stressors and interpersonal conflicts and how to cope with these, coaching, diagnostic testing, referral for medication as indicated, use prescribed medication, cognitive restructuring, interpersonal family therapy, supportive therapy services.     Goal 2: Patient will follow CD treatment recommendations within 60 days as reported by client, absence of chemical use to level of comfort and satisfaction as measured by client report for a period of at least 30 days within 6 months as clinically observed and by patient self-report.  \" I will know I have met my goal when I have gained insight into why I habitually drink.\"     Objective #A (Patient Action)                          Status: New - Date: 3/7/23               Patient will demonstrate improved insight into substance use and identify plan for sobriety that can be addressed at a lower level of care. Follow CD treatment recommendations within 60 days as reported by client, absence of        chemical use to level of comfort and satisfaction as measured by client report for a period of at least 30 days within 6 months as clinically observed and by patient self-report.     Intervention(s)  Therapist will provide individual therapies to process reasons for substance use, identify alternative coping tools and recreational activities, and develop plan for sobriety. Tx to discuss current stressors and interpersonal conflicts and how to cope with these, coaching, diagnostic testing, referral for medication as indicated, use prescribed medication, cognitive restructuring, interpersonal family therapy, supportive therapy services.        Patient has reviewed and agreed to the above plan.        YANET Lee                  March 7, 2023; 7/20/2023  Service Performed and Documented by [facility]-   tx plan reviewed and clinical supervision by YANET Calvo [facility] 3/10/2023; " 7/25/2023

## 2023-08-21 ENCOUNTER — VIRTUAL VISIT (OUTPATIENT)
Dept: PSYCHOLOGY | Facility: CLINIC | Age: 33
End: 2023-08-21
Payer: COMMERCIAL

## 2023-08-21 DIAGNOSIS — F10.99 ALCOHOL-RELATED DISORDER (H): ICD-10-CM

## 2023-08-21 DIAGNOSIS — F33.1 MAJOR DEPRESSIVE DISORDER, RECURRENT EPISODE, MODERATE WITH ANXIOUS DISTRESS (H): Primary | ICD-10-CM

## 2023-08-21 PROCEDURE — 90834 PSYTX W PT 45 MINUTES: CPT | Mod: VID

## 2023-08-21 ASSESSMENT — ANXIETY QUESTIONNAIRES
GAD7 TOTAL SCORE: 5
6. BECOMING EASILY ANNOYED OR IRRITABLE: SEVERAL DAYS
5. BEING SO RESTLESS THAT IT IS HARD TO SIT STILL: NOT AT ALL
IF YOU CHECKED OFF ANY PROBLEMS ON THIS QUESTIONNAIRE, HOW DIFFICULT HAVE THESE PROBLEMS MADE IT FOR YOU TO DO YOUR WORK, TAKE CARE OF THINGS AT HOME, OR GET ALONG WITH OTHER PEOPLE: SOMEWHAT DIFFICULT
3. WORRYING TOO MUCH ABOUT DIFFERENT THINGS: SEVERAL DAYS
7. FEELING AFRAID AS IF SOMETHING AWFUL MIGHT HAPPEN: SEVERAL DAYS
1. FEELING NERVOUS, ANXIOUS, OR ON EDGE: SEVERAL DAYS
GAD7 TOTAL SCORE: 5
2. NOT BEING ABLE TO STOP OR CONTROL WORRYING: NOT AT ALL

## 2023-08-21 ASSESSMENT — PATIENT HEALTH QUESTIONNAIRE - PHQ9
SUM OF ALL RESPONSES TO PHQ QUESTIONS 1-9: 9
5. POOR APPETITE OR OVEREATING: SEVERAL DAYS

## 2023-08-21 ASSESSMENT — COLUMBIA-SUICIDE SEVERITY RATING SCALE - C-SSRS
TOTAL  NUMBER OF INTERRUPTED ATTEMPTS SINCE LAST CONTACT: NO
SUICIDE, SINCE LAST CONTACT: NO
ATTEMPT SINCE LAST CONTACT: NO
TOTAL  NUMBER OF ABORTED OR SELF INTERRUPTED ATTEMPTS SINCE LAST CONTACT: NO
1. SINCE LAST CONTACT, HAVE YOU WISHED YOU WERE DEAD OR WISHED YOU COULD GO TO SLEEP AND NOT WAKE UP?: NO
2. HAVE YOU ACTUALLY HAD ANY THOUGHTS OF KILLING YOURSELF?: NO

## 2023-08-21 NOTE — PROGRESS NOTES
[facility]                                     Progress Note    Patient Name: Tony Bonilla  Date: 8/21/2023         Service Type: Individual      Session Start Time: 12:36pm  Session End Time: 1:24pm     Session Length: 38-52 minutes    Session #: 6    Attendees: Client    Service Modality:  Video Visit:      Provider verified identity through the following two step process.    Patient provided:  Patient is known previously to provider    Telemedicine Visit: The patient's condition can be safely assessed and treated via synchronous audio and visual telemedicine encounter.      Reason for Telemedicine Visit: Services only offered telehealth    Originating Site (Patient Location): Patient's home    Distant Site (Provider Location): [facility]    Consent:  The patient/guardian has verbally consented to: the potential risks and benefits of telemedicine (video visit) versus in person care; bill my insurance or make self-payment for services provided; and responsibility for payment of non-covered services.     Patient would like the video invitation sent by:  My Chart    Mode of Communication:  Video Conference via Amwell    Distant Location (Provider):  On-site    As the provider I attest to compliance with applicable laws and regulations related to telemedicine.    DATA  Interactive Complexity: No  Crisis: No        Progress Since Last Session (Related to Symptoms / Goals / Homework):   Symptoms: Improving per patient reporting    Homework:  Assigned      Episode of Care Goals: Minimal progress - ACTION (Actively working towards change); Intervened by reinforcing change plan / affirming steps taken     Current / Ongoing Stressors and Concerns:    Patient presents with stressors of trauma and concerns around habitual drinking. Patient shares neglecting trauma he has faced in his life and tucking away his emotions. Patient shared trauma history of his mother's  struggles with alcohol and abrupt loss of his eldest sister in his teens that has had impact on life and functioning as well as loss of his mother due to her addiction.        Treatment Objective(s) Addressed in This Session:   Patient will able to express feelings of grief and loss regarding loss of family member and behavior indicating progression of grief process towards acceptance and coping with reality of loss with acknowledgement of permanent change within 6 months  Patient will follow CD treatment recommendations within 60 days as reported by client, absence of chemical use to level of comfort and satisfaction as measured by client report for a period of at least 30 days within 6 months as clinically observed and by patient self-report.     Intervention:   Therapist met with patient to review goals and interventions. Throughout session therapist provided supportive interventions of empathetic listening as patient shared brief reflection of week. Therapist joined with and supported patient as they processed stressors around wedding planning and how to support partner emotionally throughout process. Patient and therapist engaged in reflection around sitting with negative emotions expressed by others. Patient reflected on previous relationships and identified themes of emotional manipulation and connected to reminders of behavior from parent who struggled with alcohol. Therapist utilized psychodynamic interventions: observations/reflections, remarks, clarifying questions, emotional identification, and naming emotion. Patient presented with appropriate affect. Patient was engaged throughout session and open to feedback. Patient did not report safety concerns.      Assessments completed prior to visit:  The following assessments were completed by patient for this visit:  PHQ9:       11/10/2022     2:42 PM 8/21/2023    12:42 PM   PHQ-9 SCORE   PHQ-9 Total Score MyChart 10 (Moderate depression)    PHQ-9 Total Score  10 9     GAD7:       5/9/2023     2:22 PM 8/21/2023    12:42 PM   KHLOE-7 SCORE   Total Score 5 5     PROMIS 10-Global Health (only subscores and total score):       3/7/2023     9:55 AM 7/20/2023     1:18 PM 8/21/2023    12:42 PM   PROMIS-10 Scores Only   Global Mental Health Score 13 12 12   Global Physical Health Score 16 14 14   PROMIS TOTAL - SUBSCORES 29 26 26      [place] Suicide Severity Rating Scale (Short Version)      10/31/2022     1:33 PM 11/16/2022     3:43 PM 11/17/2022     6:41 PM 2/14/2023    11:15 AM 8/21/2023    12:42 PM   [place] Suicide Severity Rating (Short Version)   Over the past 2 weeks have you felt down, depressed, or hopeless? no no no no    Over the past 2 weeks have you had thoughts of killing yourself? no no no no    Have you ever attempted to kill yourself? no no no no    1. Wish to be Dead (Since Last Contact)     N   2. Non-Specific Active Suicidal Thoughts (Since Last Contact)     N   Actual Attempt (Since Last Contact)     N   Has subject engaged in non-suicidal self-injurious behavior? (Since Last Contact)     N   Interrupted Attempts (Since Last Contact)     N   Aborted or Self-Interrupted Attempt (Since Last Contact)     N   Suicide (Since Last Contact)     N   Calculated C-SSRS Risk Score (Since Last Contact)     No Risk Indicated             ASSESSMENT: Current Emotional / Mental Status (status of significant symptoms):   Risk status (Self / Other harm or suicidal ideation)   Patient denies current fears or concerns for personal safety.   Patient denies current or recent suicidal ideation or behaviors.   Patient denies current or recent homicidal ideation or behaviors.   Patient denies current or recent self injurious behavior or ideation.   Patient denies other safety concerns.   Patient reports there has been no change in risk factors since their last session.     Patient reports there has been no change in protective factors since their last session.     Recommended that  patient call 911 or go to the local ED should there be a change in any of these risk factors.     Appearance:   Appropriate    Eye Contact:   Fair    Psychomotor Behavior: Normal    Attitude:   Pleasant   Orientation:   All   Speech    Rate / Production: Normal/ Responsive    Volume:  Normal    Mood:    Anxious    Affect:    Appropriate    Thought Content:  Clear    Thought Form:  Coherent    Insight:    Fair      Medication Review:   No current psychiatric medications prescribed     Medication Compliance:   NA     Changes in Health Issues:   None reported     Chemical Use Review:   Substance Use: decrease in alcohol .  Patient reports frequency of use Went from 8 shooter of vodka and 24oz of beer per night to 4 shooters of odka to 24oz beer per night in the past 3 weeks.  Stage of Change: Preparatory and Action  Reviewed concerns related to health related substance abuse risk  Provided encouragement towards sobriety  Provided support and affirmation for steps taken towards sobriety   Facilitated behavior chain analysis  Provided resource for Harm Reduction        Tobacco Use: No change in amount of tobacco use since last session.  Therapist did not assess    Diagnosis:  1. Major depressive disorder, recurrent episode, moderate with anxious distress (H)    2. Alcohol-related disorder (H)          Collateral Reports Completed:   Not Applicable    PLAN: (Patient Tasks / Therapist Tasks / Other)  Patient to engage in self care coping skill. Next session 9/12/23.        EPIFANIO Wayne   8/21/2023  Service Performed and Documented by EPIFANIO-   Note reviewed and clinical supervision by YANET Calvo [facility] 8/23/2023        ____________________________________________________________________________________________________________________________________     Individual Treatment Plan     Patient's Name: Tony Bonilla               YOB: 1990     Date of Creation: 3/7/2023 (Due 6/5/2023)  Date  "Treatment Plan Last Reviewed/Revised: 7/20/2023 (Due 10/18/23)   DSM5 Diagnoses:   296.32 (F33.1) Major Depressive Disorder, Recurrent Episode, Moderate With anxious distress.  291.9 (F10.99) Unspecified Alcohol Related Disorder.     Psychosocial / Contextual Factors: Hx of grief/loss of sister and mother; habitual drinking; family hx of alcoholism  PROMIS (reviewed every 90 days):   PROMIS-10 Scores     PROMIS-10 Total Score w/o Sub Scores 3/7/2023   PROMIS TOTAL - SUBSCORES 29         Referral / Collaboration:  Referral to another professional/service is not indicated at this time..     Anticipated number of session for this episode of care: 24-32  Anticipation frequency of session: Every other week  Anticipated Duration of each session: 38-52 minutes  Treatment plan will be reviewed in 90 days or when goals have been changed.         MeasurableTreatment Goal(s) related to diagnosis / functional impairment(s)  Goal 1: Patient will able to express feelings of grief and loss regarding loss of family member and behavior indicating progression of grief process towards acceptance and coping with reality of loss with acknowledgement of permanent change within 6 months    I will know I've met my goal when I have better tools to understand and cope with my mental health.\"     Objective #A (Patient Action)                             Status: Continued - Date: 7/20/2023   Patient will demonstrate/report improved stages of grief that can be safely managed in a lower level of care.  Patient report of able to express feelings of grief and loss regarding loss of family member and behavior indicating progression of grief process towards acceptance and coping with reality of loss with acknowledgement of permanent change within 6 months     Intervention(s)  Therapist will teach provide individual therapy to process through grief and loss and to gain effective coping skills. Tx to discuss current stressors and interpersonal " "conflicts and how to cope with these, coaching, diagnostic testing, referral for medication as indicated, use prescribed medication, cognitive restructuring, interpersonal family therapy, supportive therapy services.     Goal 2: Patient will follow CD treatment recommendations within 60 days as reported by client, absence of chemical use to level of comfort and satisfaction as measured by client report for a period of at least 30 days within 6 months as clinically observed and by patient self-report.  \" I will know I have met my goal when I have gained insight into why I habitually drink.\"     Objective #A (Patient Action)                          Status: New - Date: 3/7/23               Patient will demonstrate improved insight into substance use and identify plan for sobriety that can be addressed at a lower level of care. Follow CD treatment recommendations within 60 days as reported by client, absence of        chemical use to level of comfort and satisfaction as measured by client report for a period of at least 30 days within 6 months as clinically observed and by patient self-report.     Intervention(s)  Therapist will provide individual therapies to process reasons for substance use, identify alternative coping tools and recreational activities, and develop plan for sobriety. Tx to discuss current stressors and interpersonal conflicts and how to cope with these, coaching, diagnostic testing, referral for medication as indicated, use prescribed medication, cognitive restructuring, interpersonal family therapy, supportive therapy services.        Patient has reviewed and agreed to the above plan.        YANET Lee                  March 7, 2023; 7/20/2023  Service Performed and Documented by [facility]-   tx plan reviewed and clinical supervision by YANET Calvo [facility] 3/10/2023; 7/25/2023  "

## 2023-09-25 ENCOUNTER — VIRTUAL VISIT (OUTPATIENT)
Dept: PSYCHOLOGY | Facility: CLINIC | Age: 33
End: 2023-09-25
Payer: COMMERCIAL

## 2023-09-25 DIAGNOSIS — F33.1 MAJOR DEPRESSIVE DISORDER, RECURRENT EPISODE, MODERATE WITH ANXIOUS DISTRESS (H): Primary | ICD-10-CM

## 2023-09-25 NOTE — PROGRESS NOTES
9/25/2023    Patient unable to participate in session due to being outside of [facility]. Patient is on the road due to work. Patient encouraged to contact [facility] to reschedule when they return to [place].     Brianna HOLT, LGSW   9/25/2023'

## 2023-11-21 ENCOUNTER — TELEPHONE (OUTPATIENT)
Dept: PEDIATRICS | Facility: CLINIC | Age: 33
End: 2023-11-21
Payer: COMMERCIAL

## 2023-11-21 NOTE — TELEPHONE ENCOUNTER
"1st / 2nd Attempt Patient Quality Outreach Health Maintenance - PAL RN    Summary:    PAL RN attempted (attempt # [phone]) to contact pt regarding overdue health maintenance    Patient is due/failing the following:   Depression  -  DAP  Physical Preventive Adult Physical      Topic Date Due    Pneumococcal Vaccine (1 - PCV) Never done    Flu Vaccine (1) 09/01/2023    COVID-19 Vaccine (3 - 2023-24 season) 09/01/2023       Health Maintenance Due   Topic Date Due    ANNUAL REVIEW OF HM ORDERS  Never done    ADVANCE CARE PLANNING  Never done    DEPRESSION ACTION PLAN  Never done    Pneumococcal Vaccine: Pediatrics (0 to 5 Years) and At-Risk Patients (6 to 64 Years) (1 - PCV) Never done    YEARLY PREVENTIVE VISIT  08/13/2003    CMP  08/10/2023    INFLUENZA VACCINE (1) 09/01/2023    COVID-19 Vaccine (3 - 2023-24 season) 09/01/2023    NICOTINE/TOBACCO CESSATION COUNSELING Q 1 YR  10/31/2023       Type of outreach:    Sent Api[employer] message.    - PAL RN Left VM requesting call back to further discuss and schedule appt, awaiting call back at this time.  - PAL RN sent [employer] message, advised to schedule appointment with provider    Next Steps:  Max number of attempts reached:  1 . Will try again in 90 days if patient still on fail list.    Reach out within 90 days via PL[employer].    Questions for provider review:    None                                                                                     Chart routed to n/a.      - Juan \"Mathieu\" Alix (he/him/his), RN - Patient Advocate Liason (PAL)  [facility]      "

## 2024-03-09 ENCOUNTER — HEALTH MAINTENANCE LETTER (OUTPATIENT)
Age: 34
End: 2024-03-09

## 2024-10-29 ENCOUNTER — NURSE TRIAGE (OUTPATIENT)
Dept: PEDIATRICS | Facility: CLINIC | Age: 34
End: 2024-10-29

## 2024-10-29 NOTE — TELEPHONE ENCOUNTER
Nurse Triage SBAR    Is this a 2nd Level Triage? YES, LICENSED PRACTITIONER REVIEW IS REQUIRED    Situation: rash on nose, lower back, arms    Background: Pt calls requesting a steroid cream that he had prescribed in 2022 for poison ivy. He informs he was cutting wood yesterday and developed an itchy rash afterwards that was similar to poison ivy rash he has had in past     Assessment: Dime sized spot on nose, half dollar size spot on lower back and red raised spots spreading to arms. Pt endorses moderate itchiness, redness, skin tenderness. He has been trying not to scratch. He denies other symptoms.     Protocol Recommended Disposition:   See in Office Today, See More Appropriate Protocol    Recommendation: Reviewed care advice under care tab with pt. Instructed pt to call back if new or worsening symptoms. Patient was given an opportunity to ask questions, verbalized understanding of plan, and is agreeable.    Scheduled pt for today with Cassandra Couch PA-C.    Meggan CALVO RN           Does the patient meet one of the following criteria for ADS visit consideration? 16+ years old, with an [facility] PCP     TIP  Providers, please consider if this condition is appropriate for management at one of our Acute and Diagnostic Services sites.     If patient is a good candidate, please use dotphrase <dot>triageresponse and select Refer to ADS to document.      Reason for Disposition   Poison ivy, oak, or sumac rash suspected (e.g., itchy rash after contact with poison ivy)   MODERATE to SEVERE itching (e.g., interferes with work, school, sleep, or other activities)    Additional Information   Negative: Athlete's foot suspected (e.g., itchy rash of feet, especially 3rd-4th web spaces)   Negative: Insect bites suspected   Negative: Jock Itch suspected (e.g., itchy rash on inner thighs near genital area)   Negative: Pubic lice suspected (e.g., genital itching and lice or nits are seen)   Negative: Doesn't match the SYMPTOMS of  "poison ivy, oak, or sumac   Negative: Difficulty breathing or severe coughing following exposure to burning weeds   Negative: Patient sounds very sick or weak to the triager   Negative: Severe poison ivy, oak, or sumac reaction in the past, and face or genitals involved   Negative: Rash involves more than 20% of the body   Negative: Large blisters or oozing sores   Negative: Fever and area is very tender to touch   Negative: Fever and spreading red area or streak from rash    Answer Assessment - Initial Assessment Questions  1. DESCRIPTION: \"Describe the itching you are having.\" \"Where is it located?\"      Face, arms, lower back  2. SEVERITY: \"How bad is it?\"     - MILD: Doesn't interfere with normal activities.    - MODERATE-SEVERE: Interferes with work, school, sleep, or other activities.       moderate  3. SCRATCHING: \"Are there any scratch marks? Bleeding?\"      Accidentally scratched nose, but trying not to  4. ONSET: \"When did the itching begin?\"       yesterday  5. CAUSE: \"What do you think is causing the itching?\"       Poison ivy  6. OTHER SYMPTOMS: \"Do you have any other symptoms?\"   itchy      Redness, tenderness  7. PREGNANCY: \"Is there any chance you are pregnant?\" \"When was your last menstrual period?\"      N/a    Answer Assessment - Initial Assessment Questions  1. APPEARANCE of RASH: \"Describe the rash.\"       Red, raised  2. LOCATION: \"Where is the rash located?\"  (e.g., face, genitals, hands, legs)      Face, arms, lower back  3. SIZE: \"How large is the rash?\"       Size of dime on nose  On back size of a half dollar  Spreading to arms  4. ONSET: \"When did the rash begin?\"       yesterday  5. ITCHING: \"Does the rash itch?\" If Yes, ask: \"How bad is it?\"    - MILD - doesn't interfere with normal activities    - MODERATE-SEVERE: interferes with work, school, sleep, or other activities       moderate  6. EXPOSURE:  \"How were you exposed to the plant (poison ivy, poison oak, sumac)\"  \"When were you " "exposed?\"        Pt thinks he may have been while cutting wood yesterday  7. PAST HISTORY: \"Have you had a poison ivy rash before?\" If Yes, ask: \"How bad was it?\"      yes  8. PREGNANCY: \"Is there any chance you are pregnant?\" \"When was your last menstrual period?\"      N/a    Protocols used: Itching - Hhdpxawdh-Y-UC, Poison Ivy - Oak - Sumac-A-OH    "

## 2025-02-01 ENCOUNTER — TRANSFERRED RECORDS (OUTPATIENT)
Dept: HEALTH INFORMATION MANAGEMENT | Facility: CLINIC | Age: 35
End: 2025-02-01
Payer: COMMERCIAL

## 2025-02-06 ENCOUNTER — TRANSFERRED RECORDS (OUTPATIENT)
Dept: HEALTH INFORMATION MANAGEMENT | Facility: CLINIC | Age: 35
End: 2025-02-06
Payer: COMMERCIAL

## 2025-03-16 ENCOUNTER — HEALTH MAINTENANCE LETTER (OUTPATIENT)
Age: 35
End: 2025-03-16

## 2025-06-17 NOTE — ANESTHESIA PROCEDURE NOTES
Airway       Patient location during procedure: OR       Procedure Start/Stop Times: 2/14/2023 12:17 PM  Staff -        Anesthesiologist:  Rahul Del Real MD       CRNA: Kelly Peres APRN CRNA       Performed By: CRNA  Consent for Airway        Urgency: elective  Indications and Patient Condition       Indications for airway management: xochitl-procedural       Induction type:intravenous       Mask difficulty assessment: 1 - vent by mask    Final Airway Details       Final airway type: endotracheal airway       Successful airway: ETT - single  Endotracheal Airway Details        ETT size (mm): 8.0       Cuffed: yes       Successful intubation technique: direct laryngoscopy       DL Blade Type: MAC 3       Grade View of Cords: 1       Adjucts: stylet       Position: Right       Measured from: gums/teeth       Secured at (cm): 24       Bite block used: None    Post intubation assessment        Placement verified by: capnometry, equal breath sounds and chest rise        Number of attempts at approach: 1       Secured with: plastic tape       Ease of procedure: easy       Dentition: Intact and Unchanged    Medication(s) Administered   Medication Administration Time: 2/14/2023 12:17 PM       Moderate to severe aortic valve stenosis; peak velocity 4.3 m/s, mean  gradient 41 mmHg, DI 0.40, TAISHA 1.5 cm2 by continuity equation and 0.9 - 1.0  cm2 by planimetry.  Mild-moderate aortic valve regurgitation.on Echo 6/14/025. Discussed shortness of breath, passing out may be indication valve is severe, echo reviewed today with severe narrowing.     Plan for coronary catheterization Monday June 23, 2025 with admission for TAVR work up. The risks and benefits of cardiac catheterization / PTCA were discussed at length with the patient.     Risks with the procedure include risk of death, myocardial infarction, CVA, infection, bleeding, thromboembolic events, blood transfusion, neurovascular damage, need for surgical repair, renal failure, allergic reaction, need for immediate transfer for emergency CABG. Discussed 5% chance may need pacemaker. Patient to stop Warfarin 3 days prior to Cath on Monday 6/23/2025)     Alternatives were discussed and all questions were answered to the patient’s satisfaction. The patient verbalizes understanding the risks and wishes to proceed. If severe valve disease, will plan for TAVR Thursday.      Will follow up with Neurology at [facility] to assess for any bleed due to contradictory CT head:  CT head 6/13/2025  IMPRESSION:   1.  No evidence of acute intracranial hemorrhage.  CT head 6/8/2925  IMPRESSION:  1. Small volume layering intraventricular hemorrhage right greater than  left with the trace blood on the left now apparent.  2. Small focus of hyperdense hemorrhage in the right posterior fossa either  in the anterior right cerebellar hemisphere or in the adjacent right  paramesencephalic cistern.

## (undated) DEVICE — PREP CHLORAPREP 26ML TINTED ORANGE  260815

## (undated) DEVICE — SU PROLENE 2-0 SHDA 48" 8533H

## (undated) DEVICE — GLOVE BIOGEL PI ULTRATOUCH G SZ 7.0 42170

## (undated) DEVICE — DAVINCI HOT SHEARS TIP COVER  400180

## (undated) DEVICE — DAVINCI XI SEAL UNIVERSAL 5-8MM 470361

## (undated) DEVICE — DRAPE BREAST/CHEST 29420

## (undated) DEVICE — STPL CIRCULAR CURVED XLONG  29MM  ECS29B

## (undated) DEVICE — LINEN TOWEL PACK X5 5464

## (undated) DEVICE — PACK MINOR CUSTOM RIDGES SBA32RMRMA

## (undated) DEVICE — DAVINCI XI DRAPE ARM 470015

## (undated) DEVICE — SUCTION IRR STRYKERFLOW II W/TIP 250-070-520

## (undated) DEVICE — TUBING CONMED AIRSEAL SMOKE EVAC INSUFFLATION ASM-EVAC

## (undated) DEVICE — SYR BULB IRRIG 50ML LATEX FREE 0035280

## (undated) DEVICE — CATH TRAY FOLEY SURESTEP 16FR DRAIN BAG STATOCK A899916

## (undated) DEVICE — DRAPE IOBAN INCISE 23X17" 6650EZ

## (undated) DEVICE — DRAPE LEGGINGS 8421

## (undated) DEVICE — SYSTEM LAPAROVUE VISIBILITY LAPVUE10

## (undated) DEVICE — STPL DAVINCI SUREFORM 60MM STR 480460

## (undated) DEVICE — SPONGE LAP 18X18" X8435

## (undated) DEVICE — TUBING SUCTION 12"X1/4" N612

## (undated) DEVICE — DAVINCI XI DRAPE COLUMN 470341

## (undated) DEVICE — DRAPE POUCH IRR 1016

## (undated) DEVICE — DEVICE SUTURE PASSER 14GA WECK EFX EFXSP2

## (undated) DEVICE — ENDO TRAP POLYP E-TRAP 00711099

## (undated) DEVICE — SU VICRYL 3-0 SH 27" J316H

## (undated) DEVICE — SOL NACL 0.9% IRRIG 1000ML BOTTLE 2F7124

## (undated) DEVICE — GLOVE BIOGEL PI MICRO INDICATOR UNDERGLOVE SZ 7.0 48970

## (undated) DEVICE — ESU GROUND PAD ADULT W/CORD E7507

## (undated) DEVICE — LINEN ORTHO PACK 5446

## (undated) DEVICE — LUBRICANT INST ELECTROLUBE EL101

## (undated) DEVICE — JELLY LUBRICATING SURGILUBE 4OZ TUBE

## (undated) DEVICE — ENDO SNARE POLYPECTOMY OVAL 15MM LOOP SD-240U-15

## (undated) DEVICE — DAVINCI XI REDUCER 8-12MM 470381

## (undated) DEVICE — KIT PATIENT POSITIONING PIGAZZI LATEX FREE 40580

## (undated) DEVICE — SOL WATER IRRIG 1000ML BOTTLE 2F7114

## (undated) DEVICE — DAVINCI XI OBTURATOR BLADELESS 8MM 470359

## (undated) DEVICE — KIT ENDO TURNOVER/PROCEDURE W/CLEAN A SCOPE LINERS 103888

## (undated) DEVICE — SU MONOCRYL 4-0 PS-2 18" UND Y496G

## (undated) DEVICE — ESU LIGASURE LAPAROSCOPIC BLUNT TIP SEALER 5MMX37CM LF1837

## (undated) DEVICE — SU PDS II 0 CTX 60" Z990G

## (undated) DEVICE — STPL DAVINCI SUREFORM 60MM RELOAD GREEN 48360G

## (undated) DEVICE — DAVINCI SEAL CANNULA AND STPL 12MM 470380

## (undated) DEVICE — SYR 10ML SLIP TIP W/O NDL

## (undated) DEVICE — SU VICRYL 0 TIE 12X18" J906G

## (undated) DEVICE — ENDO TROCAR CONMED AIRSEAL BLADELESS 05X120MM IAS5-120LP

## (undated) DEVICE — NDL INSUFFLATION 13GA 120MM C2201

## (undated) DEVICE — DRAPE MAYO STAND 23X54 8337

## (undated) DEVICE — ESU GROUND PAD UNIVERSAL W/O CORD

## (undated) DEVICE — CONNECTOR URETERAL CATH 14FR GOLDBERG 050049

## (undated) RX ORDER — SIMETHICONE 40MG/0.6ML
SUSPENSION, DROPS(FINAL DOSAGE FORM)(ML) ORAL
Status: DISPENSED
Start: 2023-02-13

## (undated) RX ORDER — PROPOFOL 10 MG/ML
INJECTION, EMULSION INTRAVENOUS
Status: DISPENSED
Start: 2023-02-14

## (undated) RX ORDER — INDOCYANINE GREEN AND WATER 25 MG
KIT INJECTION
Status: DISPENSED
Start: 2023-02-14

## (undated) RX ORDER — FENTANYL CITRATE 50 UG/ML
INJECTION, SOLUTION INTRAMUSCULAR; INTRAVENOUS
Status: DISPENSED
Start: 2023-02-13

## (undated) RX ORDER — FENTANYL CITRATE 50 UG/ML
INJECTION, SOLUTION INTRAMUSCULAR; INTRAVENOUS
Status: DISPENSED
Start: 2023-02-14

## (undated) RX ORDER — CEFAZOLIN SODIUM/WATER 2 G/20 ML
SYRINGE (ML) INTRAVENOUS
Status: DISPENSED
Start: 2023-02-14

## (undated) RX ORDER — ALVIMOPAN 12 MG/1
CAPSULE ORAL
Status: DISPENSED
Start: 2023-02-14

## (undated) RX ORDER — GLYCOPYRROLATE 0.2 MG/ML
INJECTION, SOLUTION INTRAMUSCULAR; INTRAVENOUS
Status: DISPENSED
Start: 2023-02-14

## (undated) RX ORDER — HYDROMORPHONE HCL IN WATER/PF 6 MG/30 ML
PATIENT CONTROLLED ANALGESIA SYRINGE INTRAVENOUS
Status: DISPENSED
Start: 2023-02-14

## (undated) RX ORDER — BUPIVACAINE HYDROCHLORIDE AND EPINEPHRINE 5; 5 MG/ML; UG/ML
INJECTION, SOLUTION EPIDURAL; INTRACAUDAL; PERINEURAL
Status: DISPENSED
Start: 2023-02-14

## (undated) RX ORDER — METRONIDAZOLE 500 MG/100ML
INJECTION, SOLUTION INTRAVENOUS
Status: DISPENSED
Start: 2023-02-14

## (undated) RX ORDER — ACETAMINOPHEN 325 MG/1
TABLET ORAL
Status: DISPENSED
Start: 2023-02-14

## (undated) RX ORDER — LIDOCAINE HYDROCHLORIDE 10 MG/ML
INJECTION, SOLUTION EPIDURAL; INFILTRATION; INTRACAUDAL; PERINEURAL
Status: DISPENSED
Start: 2023-02-14

## (undated) RX ORDER — ONDANSETRON 2 MG/ML
INJECTION INTRAMUSCULAR; INTRAVENOUS
Status: DISPENSED
Start: 2023-02-14

## (undated) RX ORDER — LABETALOL HYDROCHLORIDE 5 MG/ML
INJECTION, SOLUTION INTRAVENOUS
Status: DISPENSED
Start: 2023-02-14

## (undated) RX ORDER — HEPARIN SODIUM 5000 [USP'U]/.5ML
INJECTION, SOLUTION INTRAVENOUS; SUBCUTANEOUS
Status: DISPENSED
Start: 2023-02-14

## (undated) RX ORDER — CEFAZOLIN SODIUM 1 G/3ML
INJECTION, POWDER, FOR SOLUTION INTRAMUSCULAR; INTRAVENOUS
Status: DISPENSED
Start: 2023-02-14